# Patient Record
Sex: MALE | Race: BLACK OR AFRICAN AMERICAN | Employment: PART TIME | URBAN - METROPOLITAN AREA
[De-identification: names, ages, dates, MRNs, and addresses within clinical notes are randomized per-mention and may not be internally consistent; named-entity substitution may affect disease eponyms.]

---

## 2017-01-27 PROBLEM — Z95.810 SINGLE IMPLANTABLE CARDIOVERTER-DEFIBRILLATOR (ICD) IN SITU: Status: ACTIVE | Noted: 2017-01-27

## 2018-03-22 ENCOUNTER — NURSE ONLY (OUTPATIENT)
Dept: NON INVASIVE DIAGNOSTICS | Age: 43
End: 2018-03-22
Payer: MEDICAID

## 2018-03-22 DIAGNOSIS — I42.8 NON-ISCHEMIC CARDIOMYOPATHY (HCC): ICD-10-CM

## 2018-03-22 DIAGNOSIS — Z95.810 SINGLE IMPLANTABLE CARDIOVERTER-DEFIBRILLATOR (ICD) IN SITU: Primary | ICD-10-CM

## 2018-03-22 PROCEDURE — 93296 REM INTERROG EVL PM/IDS: CPT | Performed by: INTERNAL MEDICINE

## 2018-03-22 PROCEDURE — 93295 DEV INTERROG REMOTE 1/2/MLT: CPT | Performed by: INTERNAL MEDICINE

## 2018-03-27 ENCOUNTER — TELEPHONE (OUTPATIENT)
Dept: NON INVASIVE DIAGNOSTICS | Age: 43
End: 2018-03-27

## 2018-04-04 ENCOUNTER — OFFICE VISIT (OUTPATIENT)
Dept: NON INVASIVE DIAGNOSTICS | Age: 43
End: 2018-04-04
Payer: MEDICAID

## 2018-04-04 VITALS
RESPIRATION RATE: 16 BRPM | HEART RATE: 57 BPM | DIASTOLIC BLOOD PRESSURE: 80 MMHG | BODY MASS INDEX: 31.07 KG/M2 | SYSTOLIC BLOOD PRESSURE: 104 MMHG | HEIGHT: 68 IN | WEIGHT: 205 LBS

## 2018-04-04 DIAGNOSIS — I50.22 CHRONIC SYSTOLIC HEART FAILURE (HCC): ICD-10-CM

## 2018-04-04 DIAGNOSIS — Z95.810 SINGLE IMPLANTABLE CARDIOVERTER-DEFIBRILLATOR (ICD) IN SITU: Primary | ICD-10-CM

## 2018-04-04 DIAGNOSIS — E66.8 MODERATE OBESITY: ICD-10-CM

## 2018-04-04 DIAGNOSIS — I10 ESSENTIAL HYPERTENSION: ICD-10-CM

## 2018-04-04 PROCEDURE — G8427 DOCREV CUR MEDS BY ELIG CLIN: HCPCS | Performed by: INTERNAL MEDICINE

## 2018-04-04 PROCEDURE — G8417 CALC BMI ABV UP PARAM F/U: HCPCS | Performed by: INTERNAL MEDICINE

## 2018-04-04 PROCEDURE — 1036F TOBACCO NON-USER: CPT | Performed by: INTERNAL MEDICINE

## 2018-04-04 PROCEDURE — 93282 PRGRMG EVAL IMPLANTABLE DFB: CPT | Performed by: INTERNAL MEDICINE

## 2018-04-04 PROCEDURE — 99213 OFFICE O/P EST LOW 20 MIN: CPT | Performed by: INTERNAL MEDICINE

## 2018-04-04 RX ORDER — IBUPROFEN 800 MG/1
800 TABLET ORAL EVERY 8 HOURS PRN
COMMUNITY
Start: 2018-03-30 | End: 2018-08-19

## 2018-04-04 RX ORDER — IVABRADINE 5 MG/1
TABLET, FILM COATED ORAL
Refills: 2 | COMMUNITY
Start: 2018-02-18 | End: 2018-06-05

## 2018-04-25 ENCOUNTER — HOSPITAL ENCOUNTER (EMERGENCY)
Age: 43
Discharge: HOME | End: 2018-04-25
Payer: COMMERCIAL

## 2018-04-25 VITALS
DIASTOLIC BLOOD PRESSURE: 76 MMHG | WEIGHT: 225 LBS | BODY MASS INDEX: 27.98 KG/M2 | SYSTOLIC BLOOD PRESSURE: 126 MMHG | HEART RATE: 96 BPM | HEIGHT: 75 IN | TEMPERATURE: 98.4 F | OXYGEN SATURATION: 98 %

## 2018-04-25 DIAGNOSIS — S39.012A STRAIN OF LUMBAR REGION, INITIAL ENCOUNTER: Primary | ICD-10-CM

## 2018-04-25 PROCEDURE — 6360000002 HC RX W HCPCS: Performed by: NURSE PRACTITIONER

## 2018-04-25 PROCEDURE — 99282 EMERGENCY DEPT VISIT SF MDM: CPT

## 2018-04-25 PROCEDURE — 96372 THER/PROPH/DIAG INJ SC/IM: CPT

## 2018-04-25 PROCEDURE — 99283 EMERGENCY DEPT VISIT LOW MDM: CPT | Performed by: NURSE PRACTITIONER

## 2018-04-25 RX ORDER — KETOROLAC TROMETHAMINE 30 MG/ML
30 INJECTION, SOLUTION INTRAMUSCULAR; INTRAVENOUS ONCE
Status: COMPLETED | OUTPATIENT
Start: 2018-04-25 | End: 2018-04-25

## 2018-04-25 RX ORDER — PREDNISONE 20 MG/1
20 TABLET ORAL 2 TIMES DAILY
Qty: 10 TABLET | Refills: 0 | Status: SHIPPED | OUTPATIENT
Start: 2018-04-25 | End: 2018-04-30

## 2018-04-25 RX ORDER — ORPHENADRINE CITRATE 30 MG/ML
60 INJECTION INTRAMUSCULAR; INTRAVENOUS ONCE
Status: COMPLETED | OUTPATIENT
Start: 2018-04-25 | End: 2018-04-25

## 2018-04-25 RX ORDER — CYCLOBENZAPRINE HCL 10 MG
10 TABLET ORAL 3 TIMES DAILY PRN
Qty: 30 TABLET | Refills: 0 | Status: SHIPPED | OUTPATIENT
Start: 2018-04-25 | End: 2018-05-05

## 2018-04-25 RX ORDER — METHYLPREDNISOLONE SODIUM SUCCINATE 125 MG/2ML
125 INJECTION, POWDER, LYOPHILIZED, FOR SOLUTION INTRAMUSCULAR; INTRAVENOUS ONCE
Status: COMPLETED | OUTPATIENT
Start: 2018-04-25 | End: 2018-04-25

## 2018-04-25 RX ADMIN — KETOROLAC TROMETHAMINE 30 MG: 30 INJECTION, SOLUTION INTRAMUSCULAR; INTRAVENOUS at 08:13

## 2018-04-25 RX ADMIN — METHYLPREDNISOLONE SODIUM SUCCINATE 125 MG: 125 INJECTION, POWDER, FOR SOLUTION INTRAMUSCULAR; INTRAVENOUS at 08:13

## 2018-04-25 RX ADMIN — ORPHENADRINE CITRATE 60 MG: 30 INJECTION INTRAMUSCULAR; INTRAVENOUS at 08:13

## 2018-04-25 ASSESSMENT — ENCOUNTER SYMPTOMS
BOWEL INCONTINENCE: 0
BACK PAIN: 1
ABDOMINAL PAIN: 0

## 2018-04-25 ASSESSMENT — PAIN SCALES - GENERAL: PAINLEVEL_OUTOF10: 7

## 2018-06-05 ENCOUNTER — OFFICE VISIT (OUTPATIENT)
Dept: CARDIOLOGY CLINIC | Age: 43
End: 2018-06-05
Payer: MEDICAID

## 2018-06-05 VITALS
DIASTOLIC BLOOD PRESSURE: 74 MMHG | WEIGHT: 205 LBS | SYSTOLIC BLOOD PRESSURE: 110 MMHG | BODY MASS INDEX: 31.07 KG/M2 | HEART RATE: 65 BPM | HEIGHT: 68 IN

## 2018-06-05 DIAGNOSIS — E66.8 MODERATE OBESITY: ICD-10-CM

## 2018-06-05 DIAGNOSIS — I25.10 CORONARY ARTERY DISEASE INVOLVING NATIVE CORONARY ARTERY OF NATIVE HEART WITHOUT ANGINA PECTORIS: ICD-10-CM

## 2018-06-05 DIAGNOSIS — I50.22 CHRONIC SYSTOLIC HEART FAILURE (HCC): ICD-10-CM

## 2018-06-05 DIAGNOSIS — I10 ESSENTIAL HYPERTENSION: ICD-10-CM

## 2018-06-05 DIAGNOSIS — N18.30 CKD (CHRONIC KIDNEY DISEASE) STAGE 3, GFR 30-59 ML/MIN (HCC): ICD-10-CM

## 2018-06-05 DIAGNOSIS — E78.00 PURE HYPERCHOLESTEROLEMIA: ICD-10-CM

## 2018-06-05 DIAGNOSIS — I42.8 NON-ISCHEMIC CARDIOMYOPATHY (HCC): Primary | ICD-10-CM

## 2018-06-05 PROCEDURE — G8427 DOCREV CUR MEDS BY ELIG CLIN: HCPCS | Performed by: INTERNAL MEDICINE

## 2018-06-05 PROCEDURE — 93000 ELECTROCARDIOGRAM COMPLETE: CPT | Performed by: INTERNAL MEDICINE

## 2018-06-05 PROCEDURE — G8598 ASA/ANTIPLAT THER USED: HCPCS | Performed by: INTERNAL MEDICINE

## 2018-06-05 PROCEDURE — 99214 OFFICE O/P EST MOD 30 MIN: CPT | Performed by: INTERNAL MEDICINE

## 2018-06-05 PROCEDURE — 1036F TOBACCO NON-USER: CPT | Performed by: INTERNAL MEDICINE

## 2018-06-05 PROCEDURE — G8417 CALC BMI ABV UP PARAM F/U: HCPCS | Performed by: INTERNAL MEDICINE

## 2018-06-05 RX ORDER — ATORVASTATIN CALCIUM 20 MG/1
20 TABLET, FILM COATED ORAL DAILY
Qty: 30 TABLET | Refills: 3 | Status: SHIPPED | OUTPATIENT
Start: 2018-06-05 | End: 2018-11-05 | Stop reason: SDUPTHER

## 2018-06-22 ENCOUNTER — OFFICE VISIT (OUTPATIENT)
Dept: CARDIOLOGY CLINIC | Age: 43
End: 2018-06-22
Payer: MEDICAID

## 2018-06-22 VITALS
DIASTOLIC BLOOD PRESSURE: 70 MMHG | HEART RATE: 56 BPM | HEIGHT: 68 IN | BODY MASS INDEX: 31.83 KG/M2 | SYSTOLIC BLOOD PRESSURE: 100 MMHG | WEIGHT: 210 LBS

## 2018-06-22 DIAGNOSIS — Z95.810 SINGLE IMPLANTABLE CARDIOVERTER-DEFIBRILLATOR (ICD) IN SITU: ICD-10-CM

## 2018-06-22 DIAGNOSIS — I50.22 CHRONIC SYSTOLIC HEART FAILURE (HCC): ICD-10-CM

## 2018-06-22 DIAGNOSIS — I42.9 CARDIOMYOPATHY, UNSPECIFIED TYPE (HCC): Primary | ICD-10-CM

## 2018-06-22 DIAGNOSIS — I42.8 NON-ISCHEMIC CARDIOMYOPATHY (HCC): ICD-10-CM

## 2018-06-22 PROCEDURE — 93000 ELECTROCARDIOGRAM COMPLETE: CPT | Performed by: INTERNAL MEDICINE

## 2018-06-22 PROCEDURE — 99213 OFFICE O/P EST LOW 20 MIN: CPT | Performed by: NURSE PRACTITIONER

## 2018-06-22 PROCEDURE — 1036F TOBACCO NON-USER: CPT | Performed by: NURSE PRACTITIONER

## 2018-06-22 PROCEDURE — G8417 CALC BMI ABV UP PARAM F/U: HCPCS | Performed by: NURSE PRACTITIONER

## 2018-06-22 PROCEDURE — G8598 ASA/ANTIPLAT THER USED: HCPCS | Performed by: NURSE PRACTITIONER

## 2018-06-22 PROCEDURE — G8427 DOCREV CUR MEDS BY ELIG CLIN: HCPCS | Performed by: NURSE PRACTITIONER

## 2018-06-28 RX ORDER — RANITIDINE 150 MG/1
TABLET ORAL
Qty: 30 TABLET | Refills: 0 | Status: SHIPPED | OUTPATIENT
Start: 2018-06-28 | End: 2018-12-13

## 2018-08-01 ENCOUNTER — NURSE ONLY (OUTPATIENT)
Dept: NON INVASIVE DIAGNOSTICS | Age: 43
End: 2018-08-01
Payer: MEDICAID

## 2018-08-01 DIAGNOSIS — I42.8 NON-ISCHEMIC CARDIOMYOPATHY (HCC): ICD-10-CM

## 2018-08-01 DIAGNOSIS — Z95.810 SINGLE IMPLANTABLE CARDIOVERTER-DEFIBRILLATOR (ICD) IN SITU: Primary | ICD-10-CM

## 2018-08-01 PROCEDURE — 93296 REM INTERROG EVL PM/IDS: CPT | Performed by: INTERNAL MEDICINE

## 2018-08-01 PROCEDURE — 93295 DEV INTERROG REMOTE 1/2/MLT: CPT | Performed by: INTERNAL MEDICINE

## 2018-08-09 ENCOUNTER — TELEPHONE (OUTPATIENT)
Dept: NON INVASIVE DIAGNOSTICS | Age: 43
End: 2018-08-09

## 2018-08-09 NOTE — PROGRESS NOTES
See [facility] [facility] report. Remote monitoring reviewed over a 90 day period. End of 90 day monitoring period date of service 8. 1.2018.

## 2018-08-10 NOTE — PROGRESS NOTES
I have personally reviewed the remote ICD data. Stable battery and lead parameters.   - No clinically significant arrhythmias noted  - 4 episodes of \"NSVT\" however had >95% morphology match which suggests SVT, and possible sinus tach and NOT VT   - continue follow up as recommended    Toshia Lewis MD, [address]  [facility]: [facility]  12:59 PM  8/10/2018

## 2018-08-16 ENCOUNTER — OFFICE VISIT (OUTPATIENT)
Dept: FAMILY MEDICINE CLINIC | Age: 43
End: 2018-08-16
Payer: MEDICAID

## 2018-08-16 ENCOUNTER — HOSPITAL ENCOUNTER (OUTPATIENT)
Age: 43
Discharge: HOME | End: 2018-08-18
Payer: MEDICAID

## 2018-08-16 VITALS
RESPIRATION RATE: 16 BRPM | HEIGHT: 68 IN | BODY MASS INDEX: 30.84 KG/M2 | DIASTOLIC BLOOD PRESSURE: 80 MMHG | WEIGHT: 203.5 LBS | SYSTOLIC BLOOD PRESSURE: 116 MMHG | HEART RATE: 55 BPM | TEMPERATURE: 98.2 F | OXYGEN SATURATION: 97 %

## 2018-08-16 DIAGNOSIS — Z87.19 HISTORY OF DIVERTICULITIS: ICD-10-CM

## 2018-08-16 DIAGNOSIS — I50.22 CHRONIC SYSTOLIC HEART FAILURE (HCC): ICD-10-CM

## 2018-08-16 DIAGNOSIS — R10.32 LLQ PAIN: Primary | ICD-10-CM

## 2018-08-16 DIAGNOSIS — Z11.3 SCREEN FOR STD (SEXUALLY TRANSMITTED DISEASE): ICD-10-CM

## 2018-08-16 DIAGNOSIS — I10 ESSENTIAL HYPERTENSION: ICD-10-CM

## 2018-08-16 DIAGNOSIS — N18.30 CKD (CHRONIC KIDNEY DISEASE) STAGE 3, GFR 30-59 ML/MIN (HCC): ICD-10-CM

## 2018-08-16 LAB
ALBUMIN SERPL-MCNC: 4.1 G/DL (ref 3.5–5.2)
ALP BLD-CCNC: 89 U/L (ref 40–129)
ALT SERPL-CCNC: 15 U/L (ref 0–40)
ANION GAP SERPL CALCULATED.3IONS-SCNC: 17 MMOL/L (ref 7–16)
AST SERPL-CCNC: 20 U/L (ref 0–39)
BASOPHILS ABSOLUTE: 0.07 E9/L (ref 0–0.2)
BASOPHILS RELATIVE PERCENT: 1 % (ref 0–2)
BILIRUB SERPL-MCNC: 0.7 MG/DL (ref 0–1.2)
BUN BLDV-MCNC: 10 MG/DL (ref 6–20)
CALCIUM SERPL-MCNC: 9.6 MG/DL (ref 8.6–10.2)
CHLORIDE BLD-SCNC: 102 MMOL/L (ref 98–107)
CO2: 26 MMOL/L (ref 22–29)
CREAT SERPL-MCNC: 1.6 MG/DL (ref 0.7–1.2)
EOSINOPHILS ABSOLUTE: 0.33 E9/L (ref 0.05–0.5)
EOSINOPHILS RELATIVE PERCENT: 4.5 % (ref 0–6)
GFR AFRICAN AMERICAN: 57
GFR NON-AFRICAN AMERICAN: 57 ML/MIN/1.73
GLUCOSE BLD-MCNC: 102 MG/DL (ref 74–109)
HCT VFR BLD CALC: 46.1 % (ref 37–54)
HEMOGLOBIN: 14.9 G/DL (ref 12.5–16.5)
IMMATURE GRANULOCYTES #: 0.02 E9/L
IMMATURE GRANULOCYTES %: 0.3 % (ref 0–5)
LYMPHOCYTES ABSOLUTE: 2.48 E9/L (ref 1.5–4)
LYMPHOCYTES RELATIVE PERCENT: 34.2 % (ref 20–42)
MCH RBC QN AUTO: 28.3 PG (ref 26–35)
MCHC RBC AUTO-ENTMCNC: 32.3 % (ref 32–34.5)
MCV RBC AUTO: 87.5 FL (ref 80–99.9)
MONOCYTES ABSOLUTE: 0.53 E9/L (ref 0.1–0.95)
MONOCYTES RELATIVE PERCENT: 7.3 % (ref 2–12)
NEUTROPHILS ABSOLUTE: 3.83 E9/L (ref 1.8–7.3)
NEUTROPHILS RELATIVE PERCENT: 52.7 % (ref 43–80)
PDW BLD-RTO: 14.4 FL (ref 11.5–15)
PLATELET # BLD: 275 E9/L (ref 130–450)
PMV BLD AUTO: 10.9 FL (ref 7–12)
POTASSIUM SERPL-SCNC: 3.4 MMOL/L (ref 3.5–5)
RBC # BLD: 5.27 E12/L (ref 3.8–5.8)
SODIUM BLD-SCNC: 145 MMOL/L (ref 132–146)
TOTAL PROTEIN: 7.5 G/DL (ref 6.4–8.3)
WBC # BLD: 7.3 E9/L (ref 4.5–11.5)

## 2018-08-16 PROCEDURE — 86592 SYPHILIS TEST NON-TREP QUAL: CPT

## 2018-08-16 PROCEDURE — 86694 HERPES SIMPLEX NES ANTBDY: CPT

## 2018-08-16 PROCEDURE — 1036F TOBACCO NON-USER: CPT | Performed by: FAMILY MEDICINE

## 2018-08-16 PROCEDURE — 80053 COMPREHEN METABOLIC PANEL: CPT

## 2018-08-16 PROCEDURE — 84156 ASSAY OF PROTEIN URINE: CPT

## 2018-08-16 PROCEDURE — 85025 COMPLETE CBC W/AUTO DIFF WBC: CPT

## 2018-08-16 PROCEDURE — 36415 COLL VENOUS BLD VENIPUNCTURE: CPT | Performed by: FAMILY MEDICINE

## 2018-08-16 PROCEDURE — 99214 OFFICE O/P EST MOD 30 MIN: CPT | Performed by: FAMILY MEDICINE

## 2018-08-16 PROCEDURE — 86703 HIV-1/HIV-2 1 RESULT ANTBDY: CPT

## 2018-08-16 PROCEDURE — 82570 ASSAY OF URINE CREATININE: CPT

## 2018-08-16 PROCEDURE — G8417 CALC BMI ABV UP PARAM F/U: HCPCS | Performed by: FAMILY MEDICINE

## 2018-08-16 PROCEDURE — 86803 HEPATITIS C AB TEST: CPT

## 2018-08-16 PROCEDURE — 87591 N.GONORRHOEAE DNA AMP PROB: CPT

## 2018-08-16 PROCEDURE — G8427 DOCREV CUR MEDS BY ELIG CLIN: HCPCS | Performed by: FAMILY MEDICINE

## 2018-08-16 PROCEDURE — 87491 CHLMYD TRACH DNA AMP PROBE: CPT

## 2018-08-16 PROCEDURE — G8598 ASA/ANTIPLAT THER USED: HCPCS | Performed by: FAMILY MEDICINE

## 2018-08-16 NOTE — PROGRESS NOTES
8/16/2018    Skyler Valentine is a 43 y.o. male here for   Chief Complaint   Patient presents with    Annual Exam     pt is requesting handicap placard    Abdominal Pain    Hypertension   here for routine f/u and exam, management of chronic conditions. Had an episode of squeezing chest pain a few months ago. Was hopstliazed for a few days and treated with nitro. He had a stress test which was 'fine' per patient. He had an echo as well. Some dyspnea but not as much as he used to have   For examples, trouble going up many stairs. Typically 15 stairs is okay but not if carrying items - however any symptoms he has are relieved by rest and are not associated with chest pain. He was told that his kidneys were worst at time of last hospitalization but has not been back  To see his nephrologist - has been seen by both DR Lexy Naylor [facility] past.   Regarding hypertension. Patient is  monitoring home blood pressures. Cardiovascular risk factors: dyslipidemia, hypertension, male gender and obesity (BMI >= 30 kg/m2). Patient does not smoke. Currently on lasix 40 mg, imdur 120 m, hydralazine, coreg 25 mg twice daily, also corlanor. . Taking as prescribed. No adverse effects. Today,  BP: 116/80 and he is asymptomatic. BP Readings from Last 3 Encounters:   08/16/18 116/80   06/22/18 100/70   06/05/18 110/74     Patient denies chest pain, diaphoresis, dyspnea on exertion, peripheral edema, palpitations, headache, vision changes. Wt Readings from Last 3 Encounters:   08/16/18 203 lb 8 oz (92.3 kg)   06/22/18 210 lb (95.3 kg)   06/05/18 205 lb (93 kg)     He also has a history of diverticulitis. Has been having some LLQ intermittently. He does have some mild to moderate pain today.      No Known Allergies    Medications  Current Outpatient Prescriptions   Medication Sig Dispense Refill    furosemide (LASIX) 40 MG tablet TAKE ONE TABLET BY MOUTH EVERY DAY 30 tablet 0    isosorbide mononitrate

## 2018-08-17 LAB
CREATININE URINE: 87 MG/DL (ref 40–278)
HEPATITIS C ANTIBODY INTERPRETATION: NORMAL
HIV-1 AND HIV-2 ANTIBODIES: NORMAL
PROTEIN PROTEIN: 11 MG/DL (ref 0–12)
PROTEIN/CREAT RATIO: 0.1
PROTEIN/CREAT RATIO: 0.1 (ref 0–0.2)
RPR: NORMAL

## 2018-08-18 LAB — HERPES TYPE I/II IGG COMBINED: >22.4 IV

## 2018-08-19 ASSESSMENT — ENCOUNTER SYMPTOMS
ABDOMINAL PAIN: 1
SHORTNESS OF BREATH: 1
VOMITING: 0
WHEEZING: 0
BLOOD IN STOOL: 0
COUGH: 0

## 2018-08-20 LAB
CHLAMYDIA TRACHOMATIS AMPLIFIED DET: NORMAL
N GONORRHOEAE AMPLIFIED DET: NORMAL

## 2018-09-05 NOTE — PROGRESS NOTES
[address]                                                                                                                     PRE OP INSTRUCTIONS FOR  Irma Ford        Date: 9/5/2018    Date and time of surgery: 9/6/2018   Arrival Time:     1. Do not eat or drink anything after 12 midnight prior to surgery. This includes no water, chewing gum, mints or ice chips. 2. Take the following pills with a small sip of water on the morning of Surgery: heart meds    3. Diabetics may take evening dose of insulin but none after midnight. If you feel symptomatic or low blood sugar take 1-2 ounces of apple juice only. 4. Aspirin, Ibuprofen, Advil, Naproxen, Vitamin E and other Anti-inflammatory products should be stopped  before surgery  as directed by your physician. 5. Check with your Doctor regarding stopping Plavix, Coumadin, Lovenox, Fragmin or other blood thinners. 6. Do not smoke,use illicit drugs and do not drink any alcoholic beverages 24 hours prior to surgery. 7. You may brush your teeth and gargle the morning of surgery. DO NOT SWALLOW WATER    8. You MUST make arrangements for a responsible adult to take you home after your surgery. You will not be allowed to leave alone or drive yourself home. It is strongly suggested someone stay with you the first 24 hrs. Your surgery will be cancelled if you do not have a ride home. 9. A parent/legal guardian must accompany a child scheduled for surgery and plan to stay at the hospital until the child is discharged. Please do not bring other children with you. 10. Please wear simple, loose fitting clothing to the hospital.  Stephen Wilsonmartín not bring valuables (money, credit cards, checkbooks, etc.) Do not wear any makeup (including no eye makeup) or nail polish on your fingers or toes. 11. DO NOT wear any jewelry or piercings on day of surgery. All body piercing jewelry must be removed. 12.  Shower the night before surgery with

## 2018-09-06 ENCOUNTER — ANESTHESIA (OUTPATIENT)
Dept: ENDOSCOPY | Age: 43
End: 2018-09-06
Payer: MEDICAID

## 2018-09-06 ENCOUNTER — ANESTHESIA EVENT (OUTPATIENT)
Dept: ENDOSCOPY | Age: 43
End: 2018-09-06
Payer: MEDICAID

## 2018-09-06 ENCOUNTER — HOSPITAL ENCOUNTER (OUTPATIENT)
Age: 43
Setting detail: OUTPATIENT SURGERY
Discharge: HOME | End: 2018-09-06
Attending: INTERNAL MEDICINE | Admitting: INTERNAL MEDICINE
Payer: MEDICAID

## 2018-09-06 VITALS
DIASTOLIC BLOOD PRESSURE: 64 MMHG | OXYGEN SATURATION: 97 % | SYSTOLIC BLOOD PRESSURE: 105 MMHG | RESPIRATION RATE: 27 BRPM

## 2018-09-06 VITALS
RESPIRATION RATE: 16 BRPM | HEART RATE: 60 BPM | SYSTOLIC BLOOD PRESSURE: 92 MMHG | OXYGEN SATURATION: 98 % | TEMPERATURE: 97.4 F | DIASTOLIC BLOOD PRESSURE: 60 MMHG | BODY MASS INDEX: 31.3 KG/M2 | HEIGHT: 68 IN | WEIGHT: 206.5 LBS

## 2018-09-06 PROCEDURE — 2580000003 HC RX 258: Performed by: ANESTHESIOLOGY

## 2018-09-06 PROCEDURE — 7100000011 HC PHASE II RECOVERY - ADDTL 15 MIN: Performed by: INTERNAL MEDICINE

## 2018-09-06 PROCEDURE — 3609027000 HC COLONOSCOPY: Performed by: INTERNAL MEDICINE

## 2018-09-06 PROCEDURE — 2709999900 HC NON-CHARGEABLE SUPPLY: Performed by: INTERNAL MEDICINE

## 2018-09-06 PROCEDURE — 3700000001 HC ADD 15 MINUTES (ANESTHESIA): Performed by: INTERNAL MEDICINE

## 2018-09-06 PROCEDURE — 7100000010 HC PHASE II RECOVERY - FIRST 15 MIN: Performed by: INTERNAL MEDICINE

## 2018-09-06 PROCEDURE — 3700000000 HC ANESTHESIA ATTENDED CARE: Performed by: INTERNAL MEDICINE

## 2018-09-06 PROCEDURE — 6360000002 HC RX W HCPCS: Performed by: NURSE ANESTHETIST, CERTIFIED REGISTERED

## 2018-09-06 RX ORDER — SODIUM CHLORIDE, SODIUM LACTATE, POTASSIUM CHLORIDE, CALCIUM CHLORIDE 600; 310; 30; 20 MG/100ML; MG/100ML; MG/100ML; MG/100ML
INJECTION, SOLUTION INTRAVENOUS CONTINUOUS
Status: DISCONTINUED | OUTPATIENT
Start: 2018-09-06 | End: 2018-09-06 | Stop reason: HOSPADM

## 2018-09-06 RX ORDER — 0.9 % SODIUM CHLORIDE 0.9 %
10 VIAL (ML) INJECTION PRN
Status: DISCONTINUED | OUTPATIENT
Start: 2018-09-06 | End: 2018-09-06 | Stop reason: HOSPADM

## 2018-09-06 RX ORDER — 0.9 % SODIUM CHLORIDE 0.9 %
10 VIAL (ML) INJECTION EVERY 12 HOURS SCHEDULED
Status: DISCONTINUED | OUTPATIENT
Start: 2018-09-06 | End: 2018-09-06 | Stop reason: HOSPADM

## 2018-09-06 RX ORDER — PROPOFOL 10 MG/ML
INJECTION, EMULSION INTRAVENOUS CONTINUOUS PRN
Status: DISCONTINUED | OUTPATIENT
Start: 2018-09-06 | End: 2018-09-06 | Stop reason: SDUPTHER

## 2018-09-06 RX ORDER — PROPOFOL 10 MG/ML
INJECTION, EMULSION INTRAVENOUS PRN
Status: DISCONTINUED | OUTPATIENT
Start: 2018-09-06 | End: 2018-09-06 | Stop reason: SDUPTHER

## 2018-09-06 RX ADMIN — PROPOFOL 50 MG: 10 INJECTION, EMULSION INTRAVENOUS at 07:31

## 2018-09-06 RX ADMIN — PROPOFOL 80 MG: 10 INJECTION, EMULSION INTRAVENOUS at 07:30

## 2018-09-06 RX ADMIN — SODIUM CHLORIDE, POTASSIUM CHLORIDE, SODIUM LACTATE AND CALCIUM CHLORIDE: 600; 310; 30; 20 INJECTION, SOLUTION INTRAVENOUS at 07:26

## 2018-09-06 RX ADMIN — SODIUM CHLORIDE, POTASSIUM CHLORIDE, SODIUM LACTATE AND CALCIUM CHLORIDE: 600; 310; 30; 20 INJECTION, SOLUTION INTRAVENOUS at 06:40

## 2018-09-06 RX ADMIN — PROPOFOL 180 MCG/KG/MIN: 10 INJECTION, EMULSION INTRAVENOUS at 07:30

## 2018-09-06 RX ADMIN — PROPOFOL 30 MG: 10 INJECTION, EMULSION INTRAVENOUS at 07:32

## 2018-09-06 ASSESSMENT — PAIN - FUNCTIONAL ASSESSMENT: PAIN_FUNCTIONAL_ASSESSMENT: 0-10

## 2018-09-06 ASSESSMENT — PAIN SCALES - GENERAL: PAINLEVEL_OUTOF10: 0

## 2018-09-06 NOTE — ANESTHESIA PRE PROCEDURE
Department of Anesthesiology  Preprocedure Note       Name:  Alisha Parmar   Age:  43 y.o.  :  1975                                          MRN:  [id number]         Date:  2018      Surgeon: Niharika West):  Michele Dhaliwal MD    Procedure: Procedure(s):  COLONOSCOPY    Medications prior to admission:   Prior to Admission medications    Medication Sig Start Date End Date Taking? Authorizing Provider   ASPIRIN LOW DOSE 81 MG chewable tablet CHEW AND SWALLOW ONE TABLET BY MOUTH EVERY DAY 18   Brittany Choi MD   furosemide (LASIX) 40 MG tablet TAKE ONE TABLET BY MOUTH EVERY DAY 18   MD Sandi Ugalde MISC by Does not apply route Patient cannot walk 200 ft without stopping to rest.    Expiration 18   Cassandra Smith MD   isosorbide mononitrate (IMDUR) 120 MG extended release tablet TAKE ONE TABLET BY MOUTH EVERY DAY 18   Brittany Choi MD   hydrALAZINE (APRESOLINE) 25 MG tablet TAKE THREE (3) TABLETS BY MOUTH THREE TIMES DAILY 7/10/18   Brittany Choi MD   ranitidine (ZANTAC) 150 MG tablet TAKE ONE TABLET BY MOUTH EVERY DAY AS NEEDED FOR HEARTBURN 18   Cassandra Smith MD   atorvastatin (LIPITOR) 20 MG tablet Take 1 tablet by mouth daily 18   Brittany Choi MD   CORLANOR 5 MG TABS tablet TAKE ONE TABLET BY MOUTH TWICE A DAY WITH MEALS 18   Brittany Choi MD   carvedilol (COREG) 25 MG tablet TAKE TWO TABLETS BY MOUTH TWO TIMES A DAY WITH MEALS 3/26/18   MD Sandi Calixto by Does not apply route Patient cannot walk 200 ft without stopping to rest.    Expiration 16   Cassandra Smith MD   albuterol sulfate HFA (PROVENTIL HFA) 108 (90 BASE) MCG/ACT inhaler Inhale 2 puffs into the lungs every 6 hours as needed for Wheezing 3/16/16 4/4/19  Calixto Morris,    Blood Pressure KIT Upper arm kit; use daily as directed.   Diagnosis uncontrolled hypertension, symptomatic, CKD 3 10/6/15   Cassandra Smith MD Counseling given: Not Answered      Vital Signs (Current): There were no vitals filed for this visit. BP Readings from Last 3 Encounters:   08/16/18 116/80   06/22/18 100/70   06/05/18 110/74       NPO Status:                                                                                 BMI:   Wt Readings from Last 3 Encounters:   08/16/18 203 lb 8 oz (92.3 kg)   06/22/18 210 lb (95.3 kg)   06/05/18 205 lb (93 kg)     There is no height or weight on file to calculate BMI.    CBC:   Lab Results   Component Value Date    WBC 7.3 08/16/2018    RBC 5.27 08/16/2018    HGB 14.9 08/16/2018    HCT 46.1 08/16/2018    MCV 87.5 08/16/2018    RDW 14.4 08/16/2018     08/16/2018       CMP:   Lab Results   Component Value Date     08/16/2018    K 3.4 08/16/2018     08/16/2018    CO2 26 08/16/2018    BUN 10 08/16/2018    CREATININE 1.6 08/16/2018    GFRAA 57 08/16/2018    LABGLOM 57 08/16/2018    GLUCOSE 102 08/16/2018    PROT 7.5 08/16/2018    CALCIUM 9.6 08/16/2018    BILITOT 0.7 08/16/2018    ALKPHOS 89 08/16/2018    AST 20 08/16/2018    ALT 15 08/16/2018       POC Tests: No results for input(s): POCGLU, POCNA, POCK, POCCL, POCBUN, POCHEMO, POCHCT in the last 72 hours.     Coags:   Lab Results   Component Value Date    PROTIME 12.2 01/28/2017    INR 1.1 01/28/2017       HCG (If Applicable): No results found for: PREGTESTUR, PREGSERUM, HCG, HCGQUANT     ABGs: No results found for: PHART, PO2ART, XVJ5BMX, TTG8OEJ, BEART, B0RQGGQO     Type & Screen (If Applicable):  No results found for: LABABO, [address]    Anesthesia Evaluation  Patient summary reviewed  Airway: Mallampati: II  TM distance: >3 FB   Neck ROM: full  Mouth opening: > = 3 FB Dental:      Comment: Grossly intact    Pulmonary: breath sounds clear to auscultation  (+) COPD:  sleep apnea:  asthma:                            Cardiovascular:  Exercise tolerance: good (>4 METS),   (+)

## 2018-09-06 NOTE — ANESTHESIA POSTPROCEDURE EVALUATION
Department of Anesthesiology  Postprocedure Note    Patient: Guy Garrett  MRN: [id number]  YOB: 1975  Date of evaluation: 9/6/2018  Time:  10:27 AM     Procedure Summary     Date:  09/06/18 Room / Location:  [address] ENDOSCOPY    Anesthesia Start:  0726 Anesthesia Stop:  6228    Procedure:  COLONOSCOPY (N/A ) Diagnosis:  (SCREENING)    Surgeon:  Radha Mcnally MD Responsible Provider:  Ann Addison MD    Anesthesia Type:  MAC ASA Status:  4          Anesthesia Type: MAC    Arthur Phase I: Arthur Score: 10    Arthur Phase II: Arthur Score: 10    Last vitals: Reviewed and per EMR flowsheets.        Anesthesia Post Evaluation    Patient location during evaluation: PACU  Patient participation: complete - patient participated  Level of consciousness: awake and alert  Airway patency: patent  Nausea & Vomiting: no vomiting and no nausea  Complications: no  Cardiovascular status: hemodynamically stable  Respiratory status: acceptable  Hydration status: stable

## 2018-11-13 ENCOUNTER — TELEPHONE (OUTPATIENT)
Dept: NON INVASIVE DIAGNOSTICS | Age: 43
End: 2018-11-13

## 2018-11-27 ENCOUNTER — TELEPHONE (OUTPATIENT)
Dept: NON INVASIVE DIAGNOSTICS | Age: 43
End: 2018-11-27

## 2018-12-03 ENCOUNTER — NURSE ONLY (OUTPATIENT)
Dept: NON INVASIVE DIAGNOSTICS | Age: 43
End: 2018-12-03
Payer: MEDICAID

## 2018-12-03 DIAGNOSIS — I42.8 NON-ISCHEMIC CARDIOMYOPATHY (HCC): ICD-10-CM

## 2018-12-03 DIAGNOSIS — Z95.810 SINGLE IMPLANTABLE CARDIOVERTER-DEFIBRILLATOR (ICD) IN SITU: Primary | ICD-10-CM

## 2018-12-03 PROCEDURE — 93296 REM INTERROG EVL PM/IDS: CPT | Performed by: INTERNAL MEDICINE

## 2018-12-03 PROCEDURE — 93295 DEV INTERROG REMOTE 1/2/MLT: CPT | Performed by: INTERNAL MEDICINE

## 2018-12-12 ENCOUNTER — TELEPHONE (OUTPATIENT)
Dept: NON INVASIVE DIAGNOSTICS | Age: 43
End: 2018-12-12

## 2018-12-12 NOTE — TELEPHONE ENCOUNTER
We have received your remote transmission. Our staff will contact you if there is anything that needs to be discussed. Your next appointment is 03/06/2019 remote transmission. Spoke with pt and verified next remote transmission from home.

## 2018-12-13 ENCOUNTER — OFFICE VISIT (OUTPATIENT)
Dept: CARDIOLOGY CLINIC | Age: 43
End: 2018-12-13
Payer: MEDICAID

## 2018-12-13 VITALS
HEIGHT: 68 IN | HEART RATE: 66 BPM | DIASTOLIC BLOOD PRESSURE: 80 MMHG | WEIGHT: 203 LBS | SYSTOLIC BLOOD PRESSURE: 130 MMHG | BODY MASS INDEX: 30.77 KG/M2

## 2018-12-13 DIAGNOSIS — N18.30 CKD (CHRONIC KIDNEY DISEASE) STAGE 3, GFR 30-59 ML/MIN (HCC): ICD-10-CM

## 2018-12-13 DIAGNOSIS — I50.22 CHRONIC SYSTOLIC HEART FAILURE (HCC): ICD-10-CM

## 2018-12-13 DIAGNOSIS — E66.8 MODERATE OBESITY: ICD-10-CM

## 2018-12-13 DIAGNOSIS — I10 ESSENTIAL HYPERTENSION: ICD-10-CM

## 2018-12-13 DIAGNOSIS — I25.10 CORONARY ARTERY DISEASE INVOLVING NATIVE CORONARY ARTERY OF NATIVE HEART WITHOUT ANGINA PECTORIS: ICD-10-CM

## 2018-12-13 DIAGNOSIS — I42.8 NONISCHEMIC CARDIOMYOPATHY (HCC): Primary | ICD-10-CM

## 2018-12-13 PROCEDURE — 1036F TOBACCO NON-USER: CPT | Performed by: INTERNAL MEDICINE

## 2018-12-13 PROCEDURE — G8484 FLU IMMUNIZE NO ADMIN: HCPCS | Performed by: INTERNAL MEDICINE

## 2018-12-13 PROCEDURE — 99214 OFFICE O/P EST MOD 30 MIN: CPT | Performed by: INTERNAL MEDICINE

## 2018-12-13 PROCEDURE — G8427 DOCREV CUR MEDS BY ELIG CLIN: HCPCS | Performed by: INTERNAL MEDICINE

## 2018-12-13 PROCEDURE — G8598 ASA/ANTIPLAT THER USED: HCPCS | Performed by: INTERNAL MEDICINE

## 2018-12-13 PROCEDURE — 93000 ELECTROCARDIOGRAM COMPLETE: CPT | Performed by: INTERNAL MEDICINE

## 2018-12-13 PROCEDURE — G8417 CALC BMI ABV UP PARAM F/U: HCPCS | Performed by: INTERNAL MEDICINE

## 2018-12-13 NOTE — PROGRESS NOTES
OUTPATIENT CARDIOLOGY FOLLOW-UP    Name: Seun Ibarra    Age: 43 y.o. Primary Care Physician: Alvarado Enriquez MD    Date of Service: 12/13/2018    Chief Complaint: Nonischemic cardiomyopathy, coronary atherosclerosis, hypertension, chronic kidney disease, moderate obesity    Interim History: Since his most recent evaluation, the patient remains stable from a cardiovascular standpoint with no interim symptoms of decompensated left ventricle systolic dysfunction or volume overload. While borderline systolic blood pressures are presently noted, upon review of most recent office assessment, acceptable blood pressure has been maintained on his existing medical regimen. Most recent laboratory assessment demonstrates his chronic kidney disease to remain stable. 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 He denies any arrhythmia related symptoms nor discharge of his implantable cardioverter defibrillator with episodes of untreated nonsustained ventricular tachycardia detected with no requirements of device intervention and no reported ventricular pacing. Review of Systems: The remainder of a complete multisystem review including consitutional, central nervous, respiratory, circulatory, gastrointestinal, genitourinary, endocrinologic, hematologic, musculoskeletal and psychiatric are negative.     Past Medical History:  Past Medical History:   Diagnosis Date    AICD (automatic cardioverter/defibrillator) present     Asthma     CAD (coronary artery disease)     Cardiac LV ejection fraction 10-20%     CHF (congestive heart failure) (HCC)     CKD (chronic kidney disease)     COPD (chronic obstructive pulmonary disease) (HCC)     Depression     Diverticulitis     GERD (gastroesophageal reflux disease)     Hyperlipidemia     Hypertension     Nonischemic cardiomyopathy ([facility] [address]     Panic attacks     Sleep apnea        Past Surgical History:  Past Surgical History:   Procedure Laterality Date    CARDIAC DEFIBRILLATOR PLACEMENT Left 01/27/2017    [employer] single lead ICD,     DIAGNOSTIC CARDIAC CATH LAB PROCEDURE  2015    [facility]     [place] COLONOSCOPY FLX DX W/COLLJ SPEC WHEN PFRMD N/A 9/6/2018    COLONOSCOPY performed by Raul Cartagena MD at [facility] ENDOSCOPY       Family History:  Family History   Problem Relation Age of Onset    Cancer Father        Social History:  Social History     Social History    Marital status: Single     Spouse name: N/A    Number of children: N/A    Years of education: N/A     Occupational History    Not on file.      Social History Main Topics    Smoking status: Never Smoker    Smokeless tobacco: Never Used      Comment: Patient counseled to remain smoke free    Alcohol use Yes      Comment: 2-3 times monthly    Drug use: Yes     Types: Marijuana      Comment: 9/5/2018    Sexual activity: Yes     Partners: Female      Comment:  for 7 years     Other Topics Concern    Not on file     Social History Narrative    No narrative on file       Allergies:  No Known Allergies    Current Medications:  Current Outpatient Prescriptions   Medication Sig Dispense Refill    furosemide (LASIX) 40 MG tablet TAKE ONE TABLET BY MOUTH EVERY DAY 30 tablet 0    carvedilol (COREG) 25 MG tablet TAKE TWO TABLETS BY MOUTH TWO TIMES A DAY WITH MEALS 120 tablet 4    atorvastatin (LIPITOR) 20 MG tablet TAKE ONE TABLET BY MOUTH EVERY DAY 30 tablet 2    hydrALAZINE (APRESOLINE) 25 MG tablet TAKE THREE (3) TABLETS BY MOUTH THREE TIMES DAILY 270 tablet 3    isosorbide mononitrate (IMDUR) 120 MG extended release tablet TAKE ONE TABLET BY MOUTH EVERY DAY 90 tablet 3    ASPIRIN LOW DOSE 81 MG chewable tablet CHEW AND SWALLOW ONE TABLET BY MOUTH EVERY DAY 90 tablet 3    CORLANOR 5 MG TABS tablet TAKE ONE TABLET BY MOUTH TWICE A DAY WITH MEALS 60

## 2019-03-06 ENCOUNTER — NURSE ONLY (OUTPATIENT)
Dept: NON INVASIVE DIAGNOSTICS | Age: 44
End: 2019-03-06
Payer: MEDICAID

## 2019-03-06 DIAGNOSIS — Z95.810 SINGLE IMPLANTABLE CARDIOVERTER-DEFIBRILLATOR (ICD) IN SITU: Primary | ICD-10-CM

## 2019-03-06 DIAGNOSIS — I42.8 NON-ISCHEMIC CARDIOMYOPATHY (HCC): ICD-10-CM

## 2019-03-06 PROCEDURE — 93295 DEV INTERROG REMOTE 1/2/MLT: CPT | Performed by: INTERNAL MEDICINE

## 2019-03-06 PROCEDURE — 93296 REM INTERROG EVL PM/IDS: CPT | Performed by: INTERNAL MEDICINE

## 2019-03-08 NOTE — PROGRESS NOTES
See [facility] [place] report. Remote monitoring reviewed over a 90 day period. End of 90 day monitoring period date of service 3. 6.2019.

## 2019-06-07 ENCOUNTER — NURSE ONLY (OUTPATIENT)
Dept: NON INVASIVE DIAGNOSTICS | Age: 44
End: 2019-06-07
Payer: MEDICAID

## 2019-06-07 DIAGNOSIS — Z95.810 SINGLE IMPLANTABLE CARDIOVERTER-DEFIBRILLATOR (ICD) IN SITU: Primary | ICD-10-CM

## 2019-06-07 DIAGNOSIS — I42.8 NON-ISCHEMIC CARDIOMYOPATHY (HCC): ICD-10-CM

## 2019-06-07 PROCEDURE — 93296 REM INTERROG EVL PM/IDS: CPT | Performed by: INTERNAL MEDICINE

## 2019-06-07 PROCEDURE — 93295 DEV INTERROG REMOTE 1/2/MLT: CPT | Performed by: INTERNAL MEDICINE

## 2019-06-20 ENCOUNTER — OFFICE VISIT (OUTPATIENT)
Dept: CARDIOLOGY CLINIC | Age: 44
End: 2019-06-20
Payer: MEDICAID

## 2019-06-20 VITALS
HEIGHT: 68 IN | WEIGHT: 206 LBS | SYSTOLIC BLOOD PRESSURE: 136 MMHG | DIASTOLIC BLOOD PRESSURE: 84 MMHG | BODY MASS INDEX: 31.22 KG/M2 | HEART RATE: 61 BPM

## 2019-06-20 DIAGNOSIS — I42.8 NONISCHEMIC CARDIOMYOPATHY (HCC): Primary | ICD-10-CM

## 2019-06-20 DIAGNOSIS — E78.00 PURE HYPERCHOLESTEROLEMIA: ICD-10-CM

## 2019-06-20 DIAGNOSIS — N18.30 CKD (CHRONIC KIDNEY DISEASE) STAGE 3, GFR 30-59 ML/MIN (HCC): ICD-10-CM

## 2019-06-20 DIAGNOSIS — I25.10 CORONARY ARTERY DISEASE INVOLVING NATIVE CORONARY ARTERY OF NATIVE HEART WITHOUT ANGINA PECTORIS: ICD-10-CM

## 2019-06-20 DIAGNOSIS — I50.22 CHRONIC SYSTOLIC HEART FAILURE (HCC): ICD-10-CM

## 2019-06-20 DIAGNOSIS — E66.8 MODERATE OBESITY: ICD-10-CM

## 2019-06-20 DIAGNOSIS — I10 ESSENTIAL HYPERTENSION: ICD-10-CM

## 2019-06-20 PROCEDURE — G8598 ASA/ANTIPLAT THER USED: HCPCS | Performed by: INTERNAL MEDICINE

## 2019-06-20 PROCEDURE — G8417 CALC BMI ABV UP PARAM F/U: HCPCS | Performed by: INTERNAL MEDICINE

## 2019-06-20 PROCEDURE — 99214 OFFICE O/P EST MOD 30 MIN: CPT | Performed by: INTERNAL MEDICINE

## 2019-06-20 PROCEDURE — G8427 DOCREV CUR MEDS BY ELIG CLIN: HCPCS | Performed by: INTERNAL MEDICINE

## 2019-06-20 PROCEDURE — 1036F TOBACCO NON-USER: CPT | Performed by: INTERNAL MEDICINE

## 2019-06-20 PROCEDURE — 93000 ELECTROCARDIOGRAM COMPLETE: CPT | Performed by: INTERNAL MEDICINE

## 2019-06-20 RX ORDER — FUROSEMIDE 40 MG/1
TABLET ORAL
Qty: 30 TABLET | Refills: 8 | Status: SHIPPED
Start: 2019-06-20 | End: 2020-03-30 | Stop reason: SDUPTHER

## 2019-06-20 NOTE — PROGRESS NOTES
OUTPATIENT CARDIOLOGY FOLLOW-UP    Name: Maite Felix    Age: 37 y.o. Primary Care Physician: Maria Del Carmen Cornejo MD    Date of Service: 6/20/2019    Chief Complaint: Nonischemic cardiomyopathy, chronic systolic heart failure, coronary atherosclerosis, hypertension, chronic kidney disease, moderate obesity    Interim History: As his most recent evaluation, the patient remains symptomatically stable from a cardiovascular standpoint with no interim symptoms of decompensated left ventricular systolic dysfunction or volume overload. Unfortunately, within the past week he has been without his diuretics following the completion of his most recent prescription and lack of seeking refills. At the time of his assessment while his volume status remains stable, a slight elevation of his systolic blood pressure is noted compared to that of ideal proportions. He denies any symptoms of anginal-like chest discomfort or other ischemic equivalents nor arrhythmia related manifestations with no noted discharge of his implantable cardioverter defibrillator. At the time of most recent device interrogation, appropriate device function was documented with episodes of nonsustained ventricular tachycardia and not necessitating device intervention. He has undergone no interim available reassessment of his renal function in the face of his chronic kidney disease. Review of Systems: The remainder of a complete multisystem review including consitutional, central nervous, respiratory, circulatory, gastrointestinal, genitourinary, endocrinologic, hematologic, musculoskeletal and psychiatric are negative.     Past Medical History:  Past Medical History:   Diagnosis Date    AICD (automatic cardioverter/defibrillator) present     Asthma     CAD (coronary artery disease)     Cardiac LV ejection fraction 10-20%     CHF (congestive heart failure) (HCC)     CKD (chronic kidney disease)     COPD (chronic obstructive pulmonary Relationship status: Not on file    Intimate partner violence:     Fear of current or ex partner: Not on file     Emotionally abused: Not on file     Physically abused: Not on file     Forced sexual activity: Not on file   Other Topics Concern    Not on file   Social History Narrative    Not on file       Allergies:  No Known Allergies    Current Medications:  Current Outpatient Medications   Medication Sig Dispense Refill    furosemide (LASIX) 40 MG tablet TAKE ONE TABLET BY MOUTH EVERY DAY 30 tablet 8    carvedilol (COREG) 25 MG tablet TAKE TWO TABLETS BY MOUTH TWO TIMES A DAY WITH MEALS 180 tablet 3    atorvastatin (LIPITOR) 20 MG tablet TAKE ONE TABLET BY MOUTH EVERY DAY 90 tablet 3    hydrALAZINE (APRESOLINE) 25 MG tablet TAKE THREE (3) TABLETS BY MOUTH THREE TIMES DAILY 270 tablet 3    isosorbide mononitrate (IMDUR) 120 MG extended release tablet TAKE ONE TABLET BY MOUTH EVERY DAY 90 tablet 3    ASPIRIN LOW DOSE 81 MG chewable tablet CHEW AND SWALLOW ONE TABLET BY MOUTH EVERY DAY 90 tablet 3    CORLANOR 5 MG TABS tablet TAKE ONE TABLET BY MOUTH TWICE A DAY WITH MEALS 60 tablet 11    Handicap Placard MISC by Does not apply route Patient cannot walk 200 ft without stopping to rest.    Expiration 7/2018 1 each 0    albuterol sulfate HFA (PROVENTIL HFA) 108 (90 BASE) MCG/ACT inhaler Inhale 2 puffs into the lungs every 6 hours as needed for Wheezing 1 Inhaler 0    Blood Pressure KIT Upper arm kit; use daily as directed. Diagnosis uncontrolled hypertension, symptomatic, CKD 3 1 kit 0     No current facility-administered medications for this visit. Physical Exam:  /84   Pulse 61   Ht 5' 8\" (1.727 m)   Wt 206 lb (93.4 kg)   BMI 31.32 kg/m²   Wt Readings from Last 3 Encounters:   06/20/19 206 lb (93.4 kg)   12/13/18 203 lb (92.1 kg)   09/06/18 206 lb 8 oz (93.7 kg)     The patient is awake, alert and in no discomfort or distress. No gross musculoskeletal deformity is present.  No significant skin or nail changes are present. Gross examination of head, eyes, nose and throat are negative. Jugular venous pressure is normal and no carotid bruits are present. Normal respiratory effort is noted with no accessory muscle usage present. Lung fields are clear to ascultation. Cardiac examination is notable for a regular rate and rhythm with no palpable thrill. No gallop rhythm or cardiac murmur are identified. A benign abdominal examination is present with no masses or organomegaly. Intact pulses are present throughout all extremities and no peripheral edema is present. No focal neurologic deficits are present. Laboratory Tests:  Lab Results   Component Value Date    CREATININE 1.6 (H) 08/16/2018    BUN 10 08/16/2018     08/16/2018    K 3.4 (L) 08/16/2018     08/16/2018    CO2 26 08/16/2018     No results found for: BNP  Lab Results   Component Value Date    WBC 7.3 08/16/2018    RBC 5.27 08/16/2018    HGB 14.9 08/16/2018    HCT 46.1 08/16/2018    MCV 87.5 08/16/2018    MCH 28.3 08/16/2018    MCHC 32.3 08/16/2018    RDW 14.4 08/16/2018     08/16/2018    MPV 10.9 08/16/2018     No results for input(s): ALKPHOS, ALT, AST, PROT, BILITOT, BILIDIR, LABALBU in the last 72 hours.   Lab Results   Component Value Date    MG 2.1 01/27/2017     Lab Results   Component Value Date    PROTIME 12.2 01/28/2017    INR 1.1 01/28/2017     Lab Results   Component Value Date    TSH 1.070 08/27/2015     No components found for: CHLPL  Lab Results   Component Value Date    TRIG 132 10/03/2017    TRIG 139 05/10/2016    TRIG 207 (H) 01/16/2015     Lab Results   Component Value Date    HDL 39 10/03/2017    HDL 32 05/10/2016    HDL 38 01/16/2015     Lab Results   Component Value Date    LDLCALC 134 (H) 10/03/2017    LDLCALC 151 (H) 05/10/2016    LDLCALC 149 (H) 01/16/2015       Cardiac Tests:  ECG: A resting electrocardiogram demonstrates evidence of sinus rhythm with voltage criteria for left ventricular hypertrophy and nonspecific ST changes      ASSESSMENT / PLAN: On a clinical basis, the patient remains symptomatically stable from a cardiovascular standpoint in the face of his nonischemic cardiomyopathy with no clinical evidence of volume overload. His mild blood pressure elevation may be in part related to the absence of diuretics over the past week following the completion of his most recent available refills prior to his present assessment. I have extensively discussed his needs of continued medications and avoidance of future lack of refills to reduce risk of decompensation. Additionally have recommended continued appropriate lifestyle modification to achieve weight reduction which will benefit diastolic cardiac performance as well as assisting to reduce risk of the development of obstructive sleep apnea associated with further decompensation of his cardiomyopathy and increased risk of atrial arrhythmias. Continue aggressive risk factor modification of both blood pressure and serum lipids will remain essential to reducing risk of future atherosclerotic development. I presently plan his clinical reassessment in 6 months and would happily reassess him in the interim should additional cardiovascular difficulties or concerns arise. The patient's current medication list, allergies, problem list and results of all previously ordered testing were reviewed at today's visit. Follow-up office visit in 6 months      Note: This report was completed using computerized voice recognition software. Every effort has been made to ensure accuracy, however; and invert and computerized transcription errors may be present. Alpesh Pandya.  Cheyenne Bhatt, [address] [place] [address] Cardiology    An electronic copy of this follow-up progress note was forwarded to Dr. Sharon Gutierrez

## 2019-06-20 NOTE — PATIENT INSTRUCTIONS
Patient Education        Heart-Healthy Diet: Care Instructions  Your Care Instructions    A heart-healthy diet has lots of vegetables, fruits, nuts, beans, and whole grains, and is low in salt. It limits foods that are high in saturated fat, such as meats, cheeses, and fried foods. It may be hard to change your diet, but even small changes can lower your risk of heart attack and heart disease. Follow-up care is a key part of your treatment and safety. Be sure to make and go to all appointments, and call your doctor if you are having problems. It's also a good idea to know your test results and keep a list of the medicines you take. How can you care for yourself at home? Watch your portions  · Learn what a serving is. A \"serving\" and a \"portion\" are not always the same thing. Make sure that you are not eating larger portions than are recommended. For example, a serving of pasta is ½ cup. A serving size of meat is 2 to 3 ounces. A 3-ounce serving is about the size of a deck of cards. Measure serving sizes until you are good at [place]" them. Keep in mind that restaurants often serve portions that are 2 or 3 times the size of one serving. · To keep your energy level up and keep you from feeling hungry, eat often but in smaller portions. · Eat only the number of calories you need to stay at a healthy weight. If you need to lose weight, eat fewer calories than your body burns (through exercise and other physical activity). Eat more fruits and vegetables  · Eat a variety of fruit and vegetables every day. Dark green, deep orange, red, or yellow fruits and vegetables are especially good for you. Examples include spinach, carrots, peaches, and berries. · Keep carrots, celery, and other veggies handy for snacks. Buy fruit that is in season and store it where you can see it so that you will be tempted to eat it. · Cook dishes that have a lot of veggies in them, such as stir-fries and soups.   Limit saturated and trans fat  · Read food labels, and try to avoid saturated and trans fats. They increase your risk of heart disease. Trans fat is found in many processed foods such as cookies and crackers. · Use olive or canola oil when you cook. Try cholesterol-lowering spreads, such as Benecol or Take Control. · Bake, broil, grill, or steam foods instead of frying them. · Choose lean meats instead of high-fat meats such as hot dogs and sausages. Cut off all visible fat when you prepare meat. · Eat fish, skinless poultry, and meat alternatives such as soy products instead of high-fat meats. Soy products, such as tofu, may be especially good for your heart. · Choose low-fat or fat-free milk and dairy products. Eat fish  · Eat at least two servings of fish a week. Certain fish, such as salmon and tuna, contain omega-3 fatty acids, which may help reduce your risk of heart attack. Eat foods high in fiber  · Eat a variety of grain products every day. Include whole-grain foods that have lots of fiber and nutrients. Examples of whole-grain foods include oats, whole wheat bread, and brown rice. · Buy whole-grain breads and cereals, instead of white bread or pastries. Limit salt and sodium  · Limit how much salt and sodium you eat to help lower your blood pressure. · Taste food before you salt it. Add only a little salt when you think you need it. With time, your taste buds will adjust to less salt. · Eat fewer snack items, fast foods, and other high-salt, processed foods. Check food labels for the amount of sodium in packaged foods. · Choose low-sodium versions of canned goods (such as soups, vegetables, and beans). Limit sugar  · Limit drinks and foods with added sugar. These include candy, desserts, and soda pop. Limit alcohol  · Limit alcohol to no more than 2 drinks a day for men and 1 drink a day for women. Too much alcohol can cause health problems. When should you call for help?   Watch closely for changes in your [employer], and be sure to contact your doctor if:    · You would like help planning heart-healthy meals. Where can you learn more? Go to https://chpepiceweb.[email or web address]. org and sign in to your [employer] account. Enter V137 in the [employer] box to learn more about \"Heart-Healthy Diet: Care Instructions. \"     If you do not have an account, please click on the \"Sign Up Now\" link. Current as of: July 22, 2018  Content Version: 12.0  © 2867-6143 Healthwise, Incorporated. Care instructions adapted under license by [facility] [place] ([facility] and [facility]). If you have questions about a medical condition or this instruction, always ask your healthcare professional. [address] any warranty or liability for your use of this information.

## 2019-06-26 ENCOUNTER — TELEPHONE (OUTPATIENT)
Dept: NON INVASIVE DIAGNOSTICS | Age: 44
End: 2019-06-26

## 2019-06-26 NOTE — TELEPHONE ENCOUNTER
We have received your remote transmission. Our staff will contact you if there is anything that needs to be discussed. Your next appointment is 09/11/2019 remote transmission from home    Pt is wireless    Spoke with pt and verified remote transmission from home. Talita Carpio

## 2019-06-26 NOTE — TELEPHONE ENCOUNTER
----- Message from Israel Huang RN sent at 6/26/2019  2:01 PM EDT -----  Successful transmission received. Please call patient and give next appointment.

## 2019-09-11 ENCOUNTER — NURSE ONLY (OUTPATIENT)
Dept: NON INVASIVE DIAGNOSTICS | Age: 44
End: 2019-09-11
Payer: MEDICAID

## 2019-09-11 DIAGNOSIS — I42.8 NON-ISCHEMIC CARDIOMYOPATHY (HCC): ICD-10-CM

## 2019-09-11 DIAGNOSIS — Z95.810 SINGLE IMPLANTABLE CARDIOVERTER-DEFIBRILLATOR (ICD) IN SITU: Primary | ICD-10-CM

## 2019-09-11 PROCEDURE — 93296 REM INTERROG EVL PM/IDS: CPT | Performed by: INTERNAL MEDICINE

## 2019-09-11 PROCEDURE — 93295 DEV INTERROG REMOTE 1/2/MLT: CPT | Performed by: INTERNAL MEDICINE

## 2019-09-24 ENCOUNTER — TELEPHONE (OUTPATIENT)
Dept: NON INVASIVE DIAGNOSTICS | Age: 44
End: 2019-09-24

## 2019-11-19 ENCOUNTER — HOSPITAL ENCOUNTER (EMERGENCY)
Facility: HOSPITAL | Age: 44
Discharge: HOME | End: 2019-11-19
Attending: EMERGENCY MEDICINE | Admitting: EMERGENCY MEDICINE

## 2019-11-19 ENCOUNTER — APPOINTMENT (OUTPATIENT)
Dept: GENERAL RADIOLOGY | Facility: HOSPITAL | Age: 44
End: 2019-11-19

## 2019-11-19 VITALS
HEART RATE: 100 BPM | DIASTOLIC BLOOD PRESSURE: 83 MMHG | RESPIRATION RATE: 20 BRPM | HEIGHT: 75 IN | OXYGEN SATURATION: 98 % | SYSTOLIC BLOOD PRESSURE: 131 MMHG | WEIGHT: 225 LBS | BODY MASS INDEX: 27.98 KG/M2 | TEMPERATURE: 99.9 F

## 2019-11-19 DIAGNOSIS — J20.9 ACUTE BRONCHITIS, UNSPECIFIED ORGANISM: Primary | ICD-10-CM

## 2019-11-19 LAB
FLUAV AG NPH QL: NEGATIVE
FLUBV AG NPH QL IA: NEGATIVE

## 2019-11-19 PROCEDURE — 71046 X-RAY EXAM CHEST 2 VIEWS: CPT

## 2019-11-19 PROCEDURE — 99283 EMERGENCY DEPT VISIT LOW MDM: CPT

## 2019-11-19 PROCEDURE — 94640 AIRWAY INHALATION TREATMENT: CPT

## 2019-11-19 PROCEDURE — 94799 UNLISTED PULMONARY SVC/PX: CPT

## 2019-11-19 PROCEDURE — 87804 INFLUENZA ASSAY W/OPTIC: CPT | Performed by: EMERGENCY MEDICINE

## 2019-11-19 RX ORDER — ALBUTEROL SULFATE 90 UG/1
2 AEROSOL, METERED RESPIRATORY (INHALATION) EVERY 6 HOURS PRN
Qty: 18 G | Refills: 0 | Status: SHIPPED | OUTPATIENT
Start: 2019-11-19 | End: 2020-12-18

## 2019-11-19 RX ORDER — IPRATROPIUM BROMIDE AND ALBUTEROL SULFATE 2.5; .5 MG/3ML; MG/3ML
3 SOLUTION RESPIRATORY (INHALATION) ONCE
Status: COMPLETED | OUTPATIENT
Start: 2019-11-19 | End: 2019-11-19

## 2019-11-19 RX ORDER — GUAIFENESIN AND DEXTROMETHORPHAN HYDROBROMIDE 600; 30 MG/1; MG/1
1 TABLET, EXTENDED RELEASE ORAL 2 TIMES DAILY PRN
Qty: 20 TABLET | Refills: 0 | Status: SHIPPED | OUTPATIENT
Start: 2019-11-19 | End: 2020-12-18

## 2019-11-19 RX ADMIN — IPRATROPIUM BROMIDE AND ALBUTEROL SULFATE 3 ML: 2.5; .5 SOLUTION RESPIRATORY (INHALATION) at 09:21

## 2019-11-19 NOTE — DISCHARGE INSTRUCTIONS
Take medications as prescribed.  Return to the emergency department for worsening symptoms, fever, or other concern.  Call the patient liaison for assistance in obtaining a primary care physician for follow-up.

## 2019-11-19 NOTE — ED PROVIDER NOTES
" EMERGENCY DEPARTMENT ENCOUNTER    CHIEF COMPLAINT  Chief Complaint: cough  History given by: patient  History limited by: nothing  Room Number: 04/04  PMD: Provider, No Known      HPI:  Pt is a 43 y.o. male who presents complaining of a productive cough with yellow sputum that began a couple days ago. The patient also complains of associated rhinorrhea, congestion, chills, mild wheezing, mild anterior chest \"soreness\" that occurs with coughing, and mild shortness of breath with coughing and exertion. The patient reports his roommate has had similar symptoms. The patient denies associated fever, leg swelling, or leg pain. The patient admits to tobacco use. There are no other complaints at this time.     Duration: couple days  Onset: gradual  Timing: constant  Location: respiratory  Quality: productive cough with yellow sputum   Intensity/Severity: moderate  Progression: unchanged  Associated Symptoms: rhinorrhea, congestion, chills, mild wheezing, mild anterior chest \"soreness\" that occurs with coughing, and mild shortness of breath with coughing and exertion.   Aggravating Factors: none specified  Alleviating Factors: none specified  Previous Episodes: none specified  Treatment before arrival: none specified    PAST MEDICAL HISTORY  Active Ambulatory Problems     Diagnosis Date Noted   • No Active Ambulatory Problems     Resolved Ambulatory Problems     Diagnosis Date Noted   • No Resolved Ambulatory Problems     No Additional Past Medical History       PAST SURGICAL HISTORY  History reviewed. No pertinent surgical history.    FAMILY HISTORY  History reviewed. No pertinent family history.    SOCIAL HISTORY  Social History     Socioeconomic History   • Marital status: Single     Spouse name: Not on file   • Number of children: Not on file   • Years of education: Not on file   • Highest education level: Not on file   Tobacco Use   • Smoking status: Current Every Day Smoker   Substance and Sexual Activity   • Alcohol " "use: No   • Drug use: No   • Sexual activity: Defer       ALLERGIES  Patient has no known allergies.    REVIEW OF SYSTEMS  Review of Systems   Constitutional: Positive for chills. Negative for activity change, appetite change and fever.   HENT: Positive for congestion and rhinorrhea. Negative for sore throat.    Eyes: Negative.    Respiratory: Positive for cough (productive cough with yellow sputum), shortness of breath (mild shortness of breath with coughing and exertion) and wheezing (mild).    Cardiovascular: Positive for chest pain (mild anterior chest \"soreness\" that occurs with coughing). Negative for leg swelling.   Gastrointestinal: Negative for abdominal pain, diarrhea and vomiting.   Endocrine: Negative.    Genitourinary: Negative for decreased urine volume and dysuria.   Musculoskeletal: Negative for myalgias (BLE) and neck pain.   Skin: Negative for rash and wound.   Allergic/Immunologic: Negative.    Neurological: Negative for weakness, numbness and headaches.   Hematological: Negative.    Psychiatric/Behavioral: Negative.    All other systems reviewed and are negative.      PHYSICAL EXAM  ED Triage Vitals [11/19/19 0856]   Temp Heart Rate Resp BP SpO2   99.9 °F (37.7 °C) 112 18 -- 97 %      Temp src Heart Rate Source Patient Position BP Location FiO2 (%)   Tympanic -- -- -- --       Physical Exam   Constitutional: He is oriented to person, place, and time.  Non-toxic appearance. No distress.   HENT:   Head: Normocephalic and atraumatic.   Mouth/Throat: Oropharynx is clear and moist.   Eyes: EOM are normal. Pupils are equal, round, and reactive to light.   Neck: Normal range of motion. Neck supple.   There is tenderness of lymphadenophathy in posterior lateral left neck.   Cardiovascular: Regular rhythm and normal heart sounds. Tachycardia present. Exam reveals no gallop and no friction rub.   No murmur heard.  HR in the 110's.   Pulmonary/Chest: Effort normal and breath sounds normal. No respiratory " distress. He has no decreased breath sounds. He has no wheezes. He has no rhonchi. He has no rales. He exhibits tenderness (across anterior chest wall).   Abdominal: Soft. Bowel sounds are normal. He exhibits no distension and no mass. There is no tenderness. There is no rebound and no guarding.   Musculoskeletal: Normal range of motion. He exhibits no edema (pedal) or tenderness (calf).   Neurological: He is alert and oriented to person, place, and time. He has normal sensation and normal strength.   Skin: Skin is warm and dry.   Psychiatric: Mood and affect normal.   Vitals reviewed.      LAB RESULTS  Lab Results (last 24 hours)     Procedure Component Value Units Date/Time    Influenza Antigen, Rapid - Swab, Nasopharynx [[id number]]  (Normal) Collected:  11/19/19 0908    Specimen:  Swab from Nasopharynx Updated:  11/19/19 0946     Influenza A Ag, EIA Negative     Influenza B Ag, EIA Negative          I ordered the above labs and reviewed the results    RADIOLOGY  Xr Chest 2 View    Result Date: 11/19/2019  Narrative: XR CHEST 2 VW-  Clinical: Cough and chills  COMPARISON: None  FINDINGS: The heart size is normal. The mediastinum and maykel are satisfactory in appearance.  No effusion, edema or consolidation.  There are several, scattered, tiny punctate and linear opacities demonstrated at the lung bases, no larger than a few millimeters. These could represent mucous plugging and can be seen in asthma/bronchitis. Lungs otherwise clear. The remainder the examination is unremarkable.  This report was finalized on 11/19/2019 10:04 AM by Dr. Russell Fox M.D.      I ordered the above noted radiological studies. Interpreted by radiologist. Reviewed by me in PACS.       PROCEDURES  Procedures      PROGRESS AND CONSULTS     0907 Ordered CXR and Influenza Swab for further evaluation. Ordered Duo-Neb to treat patient's respiratory symptoms.    1047 Rechecked the patient who is resting comfortably and in NAD. Patient is  stable. Informed the patient of his negative CXR and Influenza test. Discussed the plan for discharge with a prescription for Mucinex DM and an Albuterol inhaler and instructions to f/u with a PMD for further evaluation and management. Strict RTER warnings given. Pt understands and agrees with the plan, all questions answered.      MEDICAL DECISION MAKING  Results were reviewed/discussed with the patient and they were also made aware of online access. Pt also made aware that some labs, such as cultures, will not be resulted during ER visit and follow up with PMD is necessary.     MDM  Number of Diagnoses or Management Options     Amount and/or Complexity of Data Reviewed  Clinical lab tests: ordered and reviewed (Influenza Test: Negative)  Tests in the radiology section of CPT®: ordered and reviewed (Xr Chest 2 View: The heart size is normal. The mediastinum and maykel are satisfactory in appearance.  No effusion, edema or consolidation.  There are several, scattered, tiny punctate and linear opacities demonstrated at the lung bases, no larger than a few millimeters. These could represent mucous plugging and can be seen in asthma/bronchitis. Lungs otherwise clear. The remainder the examination is unremarkable.)  Decide to obtain previous medical records or to obtain history from someone other than the patient: yes  Review and summarize past medical records: yes (He has no pertinent previous records in [facility].)  Independent visualization of images, tracings, or specimens: yes    Patient Progress  Patient progress: stable         DIAGNOSIS  Final diagnoses:   Acute bronchitis, unspecified organism       DISPOSITION  DISCHARGE    Patient discharged in stable condition.    Reviewed implications of results, diagnosis, meds, responsibility to follow up, warning signs and symptoms of possible worsening, potential complications and reasons to return to ER, including any new or worsening symptoms.    Patient/Family voiced  understanding of above instructions.    Discussed plan for discharge, as there is no emergent indication for admission. Patient referred to primary care provider for BP management due to today's BP. Pt/family is agreeable and understands need for follow up and repeat testing.  Pt is aware that discharge does not mean that nothing is wrong but it indicates no emergency is present that requires admission and they must continue care with follow-up as given below or physician of their choice.     FOLLOW-UP  PATIENT LIAISON [facility] [address]  [phone]  Call today           Medication List      New Prescriptions    albuterol sulfate  (90 Base) MCG/ACT inhaler  Commonly known as:  PROVENTIL HFA;VENTOLIN HFA;PROAIR HFA  Inhale 2 puffs Every 6 (Six) Hours As Needed for Shortness of Air.     guaifenesin-dextromethorphan  MG tablet sustained-release 12 hour   tablet  Take 1 tablet by mouth 2 (Two) Times a Day As Needed (cough).              Latest Documented Vital Signs:  As of 11:00 AM  BP- 131/83 HR- 100 Temp- 99.9 °F (37.7 °C) (Tympanic) O2 sat- 98%    --  Documentation assistance provided by ruperto Elise for Dr. Rickey MD.  Information recorded by the scribe was done at my direction and has been verified and validated by me.     Maral Elise  11/19/19 1100       Beto Lang MD  11/19/19 4528

## 2019-12-11 ENCOUNTER — NURSE ONLY (OUTPATIENT)
Dept: NON INVASIVE DIAGNOSTICS | Age: 44
End: 2019-12-11
Payer: MEDICAID

## 2019-12-11 DIAGNOSIS — I42.8 NON-ISCHEMIC CARDIOMYOPATHY (HCC): ICD-10-CM

## 2019-12-11 DIAGNOSIS — Z95.810 SINGLE IMPLANTABLE CARDIOVERTER-DEFIBRILLATOR (ICD) IN SITU: Primary | ICD-10-CM

## 2019-12-11 PROCEDURE — 93295 DEV INTERROG REMOTE 1/2/MLT: CPT | Performed by: INTERNAL MEDICINE

## 2019-12-11 PROCEDURE — 93296 REM INTERROG EVL PM/IDS: CPT | Performed by: INTERNAL MEDICINE

## 2019-12-18 ENCOUNTER — OFFICE VISIT (OUTPATIENT)
Dept: CARDIOLOGY CLINIC | Age: 44
End: 2019-12-18
Payer: MEDICAID

## 2019-12-18 VITALS
SYSTOLIC BLOOD PRESSURE: 180 MMHG | HEIGHT: 68 IN | WEIGHT: 212 LBS | BODY MASS INDEX: 32.13 KG/M2 | HEART RATE: 51 BPM | DIASTOLIC BLOOD PRESSURE: 107 MMHG

## 2019-12-18 DIAGNOSIS — I25.10 CORONARY ARTERY DISEASE INVOLVING NATIVE CORONARY ARTERY OF NATIVE HEART WITHOUT ANGINA PECTORIS: ICD-10-CM

## 2019-12-18 DIAGNOSIS — E66.8 MODERATE OBESITY: ICD-10-CM

## 2019-12-18 DIAGNOSIS — I10 ESSENTIAL HYPERTENSION: ICD-10-CM

## 2019-12-18 DIAGNOSIS — I50.42 CHRONIC COMBINED SYSTOLIC AND DIASTOLIC HEART FAILURE (HCC): ICD-10-CM

## 2019-12-18 DIAGNOSIS — N18.30 CKD (CHRONIC KIDNEY DISEASE) STAGE 3, GFR 30-59 ML/MIN (HCC): ICD-10-CM

## 2019-12-18 DIAGNOSIS — I42.8 NONISCHEMIC CARDIOMYOPATHY (HCC): Primary | ICD-10-CM

## 2019-12-18 DIAGNOSIS — E78.00 PURE HYPERCHOLESTEROLEMIA: ICD-10-CM

## 2019-12-18 PROCEDURE — 93000 ELECTROCARDIOGRAM COMPLETE: CPT | Performed by: INTERNAL MEDICINE

## 2019-12-18 PROCEDURE — 1036F TOBACCO NON-USER: CPT | Performed by: INTERNAL MEDICINE

## 2019-12-18 PROCEDURE — G8598 ASA/ANTIPLAT THER USED: HCPCS | Performed by: INTERNAL MEDICINE

## 2019-12-18 PROCEDURE — G8484 FLU IMMUNIZE NO ADMIN: HCPCS | Performed by: INTERNAL MEDICINE

## 2019-12-18 PROCEDURE — G8417 CALC BMI ABV UP PARAM F/U: HCPCS | Performed by: INTERNAL MEDICINE

## 2019-12-18 PROCEDURE — G8427 DOCREV CUR MEDS BY ELIG CLIN: HCPCS | Performed by: INTERNAL MEDICINE

## 2019-12-18 PROCEDURE — 99214 OFFICE O/P EST MOD 30 MIN: CPT | Performed by: INTERNAL MEDICINE

## 2019-12-20 ENCOUNTER — TELEPHONE (OUTPATIENT)
Dept: NON INVASIVE DIAGNOSTICS | Age: 44
End: 2019-12-20

## 2020-01-10 ENCOUNTER — CARE COORDINATION (OUTPATIENT)
Dept: CARE COORDINATION | Age: 45
End: 2020-01-10

## 2020-01-10 NOTE — CARE COORDINATION
-Called and spoke with the pt to offer care coordination, however, he declined stating that he has not seen his pcp since she moved to [facility] and is looking into a new pcp in [facility] where he lives. -[facility] did provide him with a few [facility] facilities information so that he can call and ask if anyone is taking new patients, as he is not interested in coming to [facility] d/t distance. -[facility] let him know that if he establishes at any of these primary care practices in [facility] and if he is interested in the care coordination program to speak to his new pcp about it. He voiced understanding.

## 2020-02-11 ENCOUNTER — TELEPHONE (OUTPATIENT)
Dept: FAMILY MEDICINE CLINIC | Age: 45
End: 2020-02-11

## 2020-02-11 NOTE — TELEPHONE ENCOUNTER
Patient was last seen 8/2018. Patient was advised that Hugo Tejada is no longer accepting patients from her previous office and since it has been over 1.5 years since he has been seen we can not schedule him with her. I advised that I can schedule him with another physician in the office. Patient refused, said that he will try to find a physician closer to his home.

## 2020-03-11 ENCOUNTER — NURSE ONLY (OUTPATIENT)
Dept: NON INVASIVE DIAGNOSTICS | Age: 45
End: 2020-03-11
Payer: MEDICAID

## 2020-03-11 PROCEDURE — 93295 DEV INTERROG REMOTE 1/2/MLT: CPT | Performed by: INTERNAL MEDICINE

## 2020-03-11 PROCEDURE — 93296 REM INTERROG EVL PM/IDS: CPT | Performed by: INTERNAL MEDICINE

## 2020-03-16 NOTE — PROGRESS NOTES
See [employer] [place] report. Remote monitoring reviewed over a 90 day period. End of 90 day monitoring period date of service 3.11.2020.

## 2020-03-30 RX ORDER — FUROSEMIDE 40 MG/1
TABLET ORAL
Qty: 90 TABLET | Refills: 3 | Status: SHIPPED
Start: 2020-03-30 | End: 2021-04-19

## 2020-03-30 RX ORDER — FUROSEMIDE 40 MG/1
TABLET ORAL
Qty: 90 TABLET | Refills: 3 | Status: SHIPPED
Start: 2020-03-30 | End: 2020-07-29

## 2020-04-23 RX ORDER — CARVEDILOL 25 MG/1
TABLET ORAL
Qty: 180 TABLET | Refills: 3 | Status: SHIPPED
Start: 2020-04-23 | End: 2021-01-18

## 2020-05-22 RX ORDER — IVABRADINE 5 MG/1
TABLET, FILM COATED ORAL
Qty: 180 TABLET | Refills: 3 | Status: SHIPPED
Start: 2020-05-22 | End: 2021-08-20

## 2020-06-10 ENCOUNTER — NURSE ONLY (OUTPATIENT)
Dept: NON INVASIVE DIAGNOSTICS | Age: 45
End: 2020-06-10
Payer: MEDICAID

## 2020-06-10 PROCEDURE — 93296 REM INTERROG EVL PM/IDS: CPT | Performed by: INTERNAL MEDICINE

## 2020-06-10 PROCEDURE — 93295 DEV INTERROG REMOTE 1/2/MLT: CPT | Performed by: INTERNAL MEDICINE

## 2020-06-11 NOTE — PROGRESS NOTES
See [employer] [place] report. Remote monitoring reviewed over a 90 day period. End of 90 day monitoring period date of service 6.10.2020.

## 2020-07-20 RX ORDER — HYDRALAZINE HYDROCHLORIDE 25 MG/1
TABLET, FILM COATED ORAL
Qty: 270 TABLET | Refills: 3 | Status: SHIPPED
Start: 2020-07-20 | End: 2021-04-05

## 2020-07-20 RX ORDER — HYDRALAZINE HYDROCHLORIDE 25 MG/1
TABLET, FILM COATED ORAL
Qty: 270 TABLET | Refills: 3 | Status: SHIPPED
Start: 2020-07-20 | End: 2020-07-20 | Stop reason: SDUPTHER

## 2020-07-29 ENCOUNTER — OFFICE VISIT (OUTPATIENT)
Dept: CARDIOLOGY CLINIC | Age: 45
End: 2020-07-29
Payer: MEDICAID

## 2020-07-29 VITALS
HEART RATE: 64 BPM | BODY MASS INDEX: 33.04 KG/M2 | DIASTOLIC BLOOD PRESSURE: 80 MMHG | SYSTOLIC BLOOD PRESSURE: 122 MMHG | HEIGHT: 68 IN | WEIGHT: 218 LBS

## 2020-07-29 PROCEDURE — 1036F TOBACCO NON-USER: CPT | Performed by: INTERNAL MEDICINE

## 2020-07-29 PROCEDURE — G8417 CALC BMI ABV UP PARAM F/U: HCPCS | Performed by: INTERNAL MEDICINE

## 2020-07-29 PROCEDURE — 93000 ELECTROCARDIOGRAM COMPLETE: CPT | Performed by: INTERNAL MEDICINE

## 2020-07-29 PROCEDURE — 99214 OFFICE O/P EST MOD 30 MIN: CPT | Performed by: INTERNAL MEDICINE

## 2020-07-29 PROCEDURE — G8427 DOCREV CUR MEDS BY ELIG CLIN: HCPCS | Performed by: INTERNAL MEDICINE

## 2020-07-29 NOTE — PROGRESS NOTES
OUTPATIENT CARDIOLOGY FOLLOW-UP    Name: Tea Mayer    Age: 40 y.o. Primary Care Physician: No primary care provider on file. Date of Service: 7/29/2020    Chief Complaint: Nonischemic cardiomyopathy, chronic systolic heart failure, hypertension, chronic kidney disease, moderate obesity    Interim History: Since his most recent evaluation, the patient remains compensated from a cardiovascular standpoint with intermittent symptoms of exertional dyspnea (functional class II) and the recent development of a nonproductive cough in the absence of additional manifestations of decompensated left ventricular systolic dysfunction or volume overload. He denies any anginal-like chest discomfort or other ischemic equivalents and has noted no arrhythmia related manifestations. He relates compliance with medical management in part related to decreasing seeing activities in the face of the COVID-19 pandemic a 5 pound weight gain. At the time of present evaluation he is normotensive. In the absence of present primary medical care, he is undergone no recent laboratory assessment in the face of concomitant chronic kidney disease. Review of Systems: The remainder of a complete multisystem review including consitutional, central nervous, respiratory, circulatory, gastrointestinal, genitourinary, endocrinologic, hematologic, musculoskeletal and psychiatric are negative.     Past Medical History:  Past Medical History:   Diagnosis Date    AICD (automatic cardioverter/defibrillator) present     Asthma     CAD (coronary artery disease)     Cardiac LV ejection fraction 10-20%     [facility]F [facility]congestive [facility] failure) ([facility])     CKD (chronic kidney disease)     COPD (chronic obstructive pulmonary disease) ([facility])     Depression     Diverticulitis     GERD (gastroesophageal reflux disease)     Hyperlipidemia     Hypertension     Nonischemic cardiomyopathy (HCC)     Panic attacks     Sleep apnea        Past file   Other Topics Concern    Not on file   Social History Narrative    Not on file       Allergies:  No Known Allergies    Current Medications:  Current Outpatient Medications   Medication Sig Dispense Refill    hydrALAZINE (APRESOLINE) 25 MG tablet TAKE THREE (3) TABLETS BY MOUTH THREE TIMES DAILY 270 tablet 3    CORLANOR 5 MG TABS tablet TAKE ONE TABLET BY MOUTH TWICE A DAY WITH MEALS 180 tablet 3    carvedilol (COREG) 25 MG tablet TAKE TWO TABLETS BY MOUTH TWO TIMES A DAY WITH MEALS 180 tablet 3    furosemide (LASIX) 40 MG tablet TAKE ONE TABLET BY MOUTH EVERY DAY 90 tablet 3    ASPIRIN LOW DOSE 81 MG chewable tablet CHEW AND SWALLOW ONE TABLET BY MOUTH EVERY DAY 90 tablet 3    atorvastatin (LIPITOR) 20 MG tablet TAKE ONE TABLET BY MOUTH EVERY DAY 90 tablet 3    isosorbide mononitrate (IMDUR) 120 MG extended release tablet TAKE ONE TABLET BY MOUTH EVERY DAY 90 tablet 3    Handicap Placard MISC by Does not apply route Patient cannot walk 200 ft without stopping to rest.    Expiration 7/2018 1 each 0    Blood Pressure KIT Upper arm kit; use daily as directed. Diagnosis uncontrolled hypertension, symptomatic, CKD 3 1 kit 0    albuterol sulfate HFA (PROVENTIL HFA) 108 (90 BASE) MCG/ACT inhaler Inhale 2 puffs into the lungs every 6 hours as needed for Wheezing 1 Inhaler 0     No current facility-administered medications for this visit. Physical Exam:  /80 (Site: Right Upper Arm, Position: Sitting, Cuff Size: Large Adult)   Pulse 64   Ht 5' 8\" (1.727 m)   Wt 218 lb (98.9 kg)   BMI 33.15 kg/m²   Wt Readings from Last 3 Encounters:   07/29/20 218 lb (98.9 kg)   12/18/19 212 lb (96.2 kg)   06/20/19 206 lb (93.4 kg)     The patient is awake, alert and in no discomfort or distress. No gross musculoskeletal deformity is present. No significant skin or nail changes are present. Gross examination of head, eyes, nose and throat are negative.  Jugular venous pressure is normal and no carotid cardiovascular standpoint with mild symptoms of exertional dyspnea and a nonproductive cough in the face of his nonischemic cardiomyopathy and severe left ventricular systolic dysfunction. A component of his symptoms is likely related to his ongoing weight gain and that of moderate obesity with further discussion extensively held regarding his needs of lifestyle modification to achieve weight reduction to benefit the diastolic component of his heart failure management as well as reducing his risk of the development of obstructive sleep apnea with associated adverse cardiovascular effects. Based on duration since most recent objective assessment of left ventricular systolic function, I have recommended a repeat echocardiogram for assessment of left ventricular systolic and diastolic cardiac performance as well as that of laboratory assessment of his renal function and electrolytes in the face of concomitant chronic kidney disease and a proBNP level to optimize heart failure management as necessary. While present blood pressures are significantly improved, continued appropriate monitoring of blood pressure as well as that of serum lipids will remain essential to reducing his risk of future atherosclerotic development. Presently he is devoid of primary medical care and I have discussed his needs of prompt resumption of care with a new primary care provider at his previous location following the relocation of his previous primary care physician. Unless dictated by the findings of his echocardiogram, I plan clinical reassessment in approximately 6 months would happily reassess him in the interim should additional cardiovascular difficulties or concerns arise. The patient's current medication list, allergies, problem list and results of all previously ordered testing were reviewed at today's visit.     Follow-up office visit in 6 months      Note: This report was completed using computerized voice recognition software. Every effort has been made to ensure accuracy, however; and invert and computerized transcription errors may be present. Catarina Sommer.  Khanh Caal, [address] [facility]

## 2020-08-06 ENCOUNTER — TELEPHONE (OUTPATIENT)
Dept: CARDIOLOGY CLINIC | Age: 45
End: 2020-08-06

## 2020-08-06 RX ORDER — POTASSIUM CHLORIDE 20 MEQ/1
20 TABLET, EXTENDED RELEASE ORAL DAILY
COMMUNITY
End: 2020-08-06 | Stop reason: SDUPTHER

## 2020-08-06 RX ORDER — POTASSIUM CHLORIDE 20 MEQ/1
20 TABLET, EXTENDED RELEASE ORAL DAILY
Qty: 90 TABLET | Refills: 3 | Status: SHIPPED
Start: 2020-08-06 | End: 2021-02-17

## 2020-08-31 ENCOUNTER — TELEPHONE (OUTPATIENT)
Dept: CARDIOLOGY | Age: 45
End: 2020-08-31

## 2020-09-10 ENCOUNTER — NURSE ONLY (OUTPATIENT)
Dept: NON INVASIVE DIAGNOSTICS | Age: 45
End: 2020-09-10
Payer: MEDICAID

## 2020-09-10 PROCEDURE — 93296 REM INTERROG EVL PM/IDS: CPT | Performed by: INTERNAL MEDICINE

## 2020-09-10 PROCEDURE — 93295 DEV INTERROG REMOTE 1/2/MLT: CPT | Performed by: INTERNAL MEDICINE

## 2020-09-14 ENCOUNTER — TELEPHONE (OUTPATIENT)
Dept: CARDIOLOGY | Age: 45
End: 2020-09-14

## 2020-09-16 ENCOUNTER — TELEPHONE (OUTPATIENT)
Dept: CARDIOLOGY | Age: 45
End: 2020-09-16

## 2020-09-16 NOTE — TELEPHONE ENCOUNTER
Patient missed his first echo appointment on 9/2 and was rescheduled for today and was a no show no call. Left message for him to please call back.

## 2020-09-17 NOTE — PROGRESS NOTES
See [employer] [place] report. Remote monitoring reviewed over a 90 day period. End of 90 day monitoring period date of service 9.10.2020.

## 2020-09-30 ENCOUNTER — TELEPHONE (OUTPATIENT)
Dept: CARDIOLOGY CLINIC | Age: 45
End: 2020-09-30

## 2020-09-30 ENCOUNTER — HOSPITAL ENCOUNTER (OUTPATIENT)
Dept: CARDIOLOGY | Age: 45
Discharge: HOME | End: 2020-09-30
Payer: MEDICAID

## 2020-09-30 LAB
LV EF: 65 %
LVEF MODALITY: NORMAL

## 2020-09-30 PROCEDURE — 93306 TTE W/DOPPLER COMPLETE: CPT

## 2020-09-30 NOTE — TELEPHONE ENCOUNTER
----- Message from Charu Reyes MD sent at 9/30/2020 12:45 PM EDT -----  Please notify patient that echocardiogram shows significant improvement of heart muscle function with present medications.   Continue as directed and follow-up as scheduled    Called pt re above

## 2020-11-02 RX ORDER — ISOSORBIDE MONONITRATE 120 MG/1
TABLET, EXTENDED RELEASE ORAL
Qty: 90 TABLET | Refills: 3 | Status: SHIPPED
Start: 2020-11-02 | End: 2021-11-01

## 2020-12-11 ENCOUNTER — NURSE ONLY (OUTPATIENT)
Dept: NON INVASIVE DIAGNOSTICS | Age: 45
End: 2020-12-11
Payer: MEDICAID

## 2020-12-11 DIAGNOSIS — Z95.810 SINGLE IMPLANTABLE CARDIOVERTER-DEFIBRILLATOR (ICD) IN SITU: Primary | ICD-10-CM

## 2020-12-11 DIAGNOSIS — I42.8 NON-ISCHEMIC CARDIOMYOPATHY (HCC): ICD-10-CM

## 2020-12-11 PROCEDURE — 93295 DEV INTERROG REMOTE 1/2/MLT: CPT | Performed by: INTERNAL MEDICINE

## 2020-12-11 PROCEDURE — 93296 REM INTERROG EVL PM/IDS: CPT | Performed by: INTERNAL MEDICINE

## 2021-01-15 NOTE — PROGRESS NOTES
See [facility] [place] report. Remote monitoring reviewed over a 90 day period. End of 90 day monitoring period date of service 12.11.2020.

## 2021-01-18 ENCOUNTER — TELEPHONE (OUTPATIENT)
Dept: NON INVASIVE DIAGNOSTICS | Age: 46
End: 2021-01-18

## 2021-01-18 RX ORDER — CARVEDILOL 25 MG/1
TABLET ORAL
Qty: 180 TABLET | Refills: 3 | Status: SHIPPED
Start: 2021-01-18 | End: 2021-07-30

## 2021-01-18 NOTE — TELEPHONE ENCOUNTER
----- Message from Beltran Allen RN sent at 1/15/2021  9:32 AM EST -----  No appointments since 2018.  [place] device

## 2021-02-01 RX ORDER — ASPIRIN 81 MG
TABLET,CHEWABLE ORAL
Qty: 90 TABLET | Refills: 3 | Status: SHIPPED
Start: 2021-02-01 | End: 2021-06-03

## 2021-02-02 ENCOUNTER — OFFICE VISIT (OUTPATIENT)
Dept: NON INVASIVE DIAGNOSTICS | Age: 46
End: 2021-02-02
Payer: MEDICAID

## 2021-02-02 VITALS
SYSTOLIC BLOOD PRESSURE: 132 MMHG | DIASTOLIC BLOOD PRESSURE: 90 MMHG | HEIGHT: 68 IN | BODY MASS INDEX: 33.52 KG/M2 | HEART RATE: 55 BPM | WEIGHT: 221.2 LBS

## 2021-02-02 DIAGNOSIS — I10 ESSENTIAL HYPERTENSION: ICD-10-CM

## 2021-02-02 DIAGNOSIS — I42.8 NONISCHEMIC CARDIOMYOPATHY (HCC): ICD-10-CM

## 2021-02-02 DIAGNOSIS — Z95.810 SINGLE IMPLANTABLE CARDIOVERTER-DEFIBRILLATOR (ICD) IN SITU: Primary | ICD-10-CM

## 2021-02-02 DIAGNOSIS — E66.8 MODERATE OBESITY: ICD-10-CM

## 2021-02-02 PROCEDURE — G8427 DOCREV CUR MEDS BY ELIG CLIN: HCPCS | Performed by: INTERNAL MEDICINE

## 2021-02-02 PROCEDURE — G8417 CALC BMI ABV UP PARAM F/U: HCPCS | Performed by: INTERNAL MEDICINE

## 2021-02-02 PROCEDURE — 1036F TOBACCO NON-USER: CPT | Performed by: INTERNAL MEDICINE

## 2021-02-02 PROCEDURE — G8484 FLU IMMUNIZE NO ADMIN: HCPCS | Performed by: INTERNAL MEDICINE

## 2021-02-02 PROCEDURE — 99214 OFFICE O/P EST MOD 30 MIN: CPT | Performed by: INTERNAL MEDICINE

## 2021-02-02 PROCEDURE — 93282 PRGRMG EVAL IMPLANTABLE DFB: CPT | Performed by: INTERNAL MEDICINE

## 2021-02-02 NOTE — PROGRESS NOTES
Cardiac Electrophysiology Outpatient Progress Note    Tony Huerta  1975  Date of Service: 2/2/2021  Referring Provider/PCP: No primary care provider on file. Chief Complaint: ICD management    HISTORY OF PRESENT ILLNESS    Tony Huerta presents to the office today for follow up of these Electrophysiology conditions: ICD management. Mr. Fifi Junior is a 39 y.o., male with a history of CKD Stage 3, HTN, obesity, chronic systolic CHF, NYHA II Stage C, non-ischemic etiology, asthmas and ICD insitu. The patient had a ICD placed in January 2017 for primary prevention. 2/2/2021: The patient presents today for follow up. He reports feeling overall well from a EP POV. His device site looks well healed and free from infection or erosion. He offers no complaints from a device POV. Currently denies any angina, syncope, dyspnea on exertion, paroxysmal nocturnal dyspnea and palpitations. The patient continues to be followed remotely.       Patient Active Problem List    Diagnosis Date Noted    Coronary artery disease involving native coronary artery of native heart without angina pectoris 06/05/2018    Pure hypercholesterolemia 06/05/2018    Single implantable cardioverter-defibrillator (ICD) in situ 01/27/2017    Essential hypertension 12/02/2016    Moderate obesity 10/25/2016    Chronic systolic heart failure ([facility] [address] 04/22/2016    Nonischemic cardiomyopathy ([facility] [address] 03/21/2016    CKD (chronic kidney disease) stage 3, GFR 30-59 ml/min 08/28/2015    Asthma 08/27/2015       Family History   Problem Relation Age of Onset    Cancer Father     No Known Problems Mother        SOCIAL HISTORY   Social History     Socioeconomic History    Marital status: Single     Spouse name: Not on file    Number of children: Not on file    Years of education: Not on file    Highest education level: Not on file   Occupational History    Not on file   Social Needs    Financial resource strain: Not on file  isosorbide mononitrate (IMDUR) 120 MG extended release tablet TAKE ONE TABLET BY MOUTH EVERY DAY 90 tablet 3    potassium chloride (KLOR-CON M) 20 MEQ extended release tablet Take 1 tablet by mouth daily 90 tablet 3    hydrALAZINE (APRESOLINE) 25 MG tablet TAKE THREE (3) TABLETS BY MOUTH THREE TIMES DAILY 270 tablet 3    CORLANOR 5 MG TABS tablet TAKE ONE TABLET BY MOUTH TWICE A DAY WITH MEALS 180 tablet 3    furosemide (LASIX) 40 MG tablet TAKE ONE TABLET BY MOUTH EVERY DAY 90 tablet 3    atorvastatin (LIPITOR) 20 MG tablet TAKE ONE TABLET BY MOUTH EVERY DAY 90 tablet 3    Handicap Placard MISC by Does not apply route Patient cannot walk 200 ft without stopping to rest.    Expiration 7/2018 1 each 0    albuterol sulfate HFA (PROVENTIL HFA) 108 (90 BASE) MCG/ACT inhaler Inhale 2 puffs into the lungs every 6 hours as needed for Wheezing 1 Inhaler 0    Blood Pressure KIT Upper arm kit; use daily as directed. Diagnosis uncontrolled hypertension, symptomatic, CKD 3 (Patient not taking: Reported on 2/2/2021) 1 kit 0     No current facility-administered medications for this visit. No Known Allergies        ROS:   Review of Systems        PHYSICAL EXAM:  Vitals:    02/02/21 1051   BP: (!) 132/90   Pulse: 55   Weight: 221 lb 3.2 oz (100.3 kg)   Height: 5' 8\" (1.727 m)     Constitutional: Oriented to person, place, and time. Well-developed and cooperative. Head: Normocephalic and atraumatic. Eyes: Conjunctivae are normal.  Neck: No  JVD present. Cardiovascular: S1 normal, S2 normal  A regular rhythm present. Pulmonary/Chest: Effort normal and breath sounds normal. No respiratory distress. Abdominal: Soft. Normal appearance   Musculoskeletal: Normal range of motion of all extremities, no muscle weakness. Neurological: Alert and oriented to person, place, and time. Skin: Skin is warm and dry. No bruising, no ecchymosis and no rash noted. Extremity: No clubbing or cyanosis. No edema.  There is doppler evidence of stage I diastolic dysfunction. Last Cath: per Dr. Kenrick Chan' note (3/16)  [facility] at [facility] apparently showed 30% distal LM but no other CAD  LVEF 15-20%    TTE: 2020  Summary   Left ventricular internal dimensions were normal in diastole and systole. Mild left ventricular concentric hypertrophy noted. No regional wall motion abnormalities seen. Normal left ventricular ejection fraction. There is doppler evidence of stage I diastolic dysfunction. Mild tricuspid regurgitation. Device Interrogation: 2/2/21   Underlying rhythm: VS  Mode: VVI 40  Battery Voltage/Longevity:  12 years    Pacing:  RV: <1%    RV R wave: 19.8 mV  Impedance: 550 ohms   Threshold: 0.7 V @ 0.5 ms  Episodes: 7 NSVT episodes since March 2020  Reprogramming included: see below  Overall device function is normal    All device programmable settings were evaluated per above and in the scanned document, along with iterative adjustments (capture thresholds) to assess and select the most appropriate final programming to provide for consistent delivery of the appropriate therapy and to verify function of the device. I have independently reviewed all of the ECGs and rhythm strips per above    I have personally reviewed the laboratory, cardiac diagnostic and radiographic testing as outlined above: We have requested previous records. 1. Single implantable cardioverter-defibrillator (ICD) in situ    2. Nonischemic cardiomyopathy ([address]    3. Essential hypertension    4. Moderate obesity         Assessment & Plan  1. Chronic systolic heart failure  - Class II, Stage C  - on coreg + Bidil equivalent (apparently no ACE/ARB/ARNI due to CKD) + aldactone  - EF 65% on TTE 9/2020  - Continue CHF management by Dr. Kenrick Chan  - Single chamber ICD placed 1/17/2017    2. CKD 3  - Creat 1.6     3. HTN  - reasonable control  - see #1  - continue current medications    4.  Obesity  - recommend weight loss - Body mass index is 33.63 kg/m². 5. ICD in-situ  - single chamber placed 1/17/17 for primary prevention  - see above interrogation  - no ICD therapies thus far    Plan  1. No changes in medications at this time. 2. Remotes q 91 days   3. Follow up in 1 year or sooner PRN. I have spent a total of 40 minutes with the patient and his/her family reviewing the above stated recommendations. A total of >50% of that time involved face-to-face time providing counseling and or coordination of care with the other providers. Thank you for allowing me to participate in your patient's care.     Triston Nazario MD  Cardiac Electrophysiology  [address] [place] [address]

## 2021-02-16 ENCOUNTER — OFFICE VISIT (OUTPATIENT)
Dept: CARDIOLOGY CLINIC | Age: 46
End: 2021-02-16
Payer: MEDICAID

## 2021-02-16 VITALS
HEART RATE: 58 BPM | HEIGHT: 68 IN | WEIGHT: 224 LBS | SYSTOLIC BLOOD PRESSURE: 134 MMHG | DIASTOLIC BLOOD PRESSURE: 66 MMHG | BODY MASS INDEX: 33.95 KG/M2

## 2021-02-16 DIAGNOSIS — E66.8 MODERATE OBESITY: ICD-10-CM

## 2021-02-16 DIAGNOSIS — I50.22 CHRONIC SYSTOLIC HEART FAILURE (HCC): ICD-10-CM

## 2021-02-16 DIAGNOSIS — I42.8 NONISCHEMIC CARDIOMYOPATHY (HCC): Primary | ICD-10-CM

## 2021-02-16 DIAGNOSIS — N18.32 STAGE 3B CHRONIC KIDNEY DISEASE (HCC): ICD-10-CM

## 2021-02-16 DIAGNOSIS — E78.00 PURE HYPERCHOLESTEROLEMIA: ICD-10-CM

## 2021-02-16 DIAGNOSIS — I10 ESSENTIAL HYPERTENSION: ICD-10-CM

## 2021-02-16 DIAGNOSIS — I25.10 CORONARY ARTERY DISEASE INVOLVING NATIVE CORONARY ARTERY OF NATIVE HEART WITHOUT ANGINA PECTORIS: ICD-10-CM

## 2021-02-16 PROCEDURE — 93000 ELECTROCARDIOGRAM COMPLETE: CPT | Performed by: INTERNAL MEDICINE

## 2021-02-16 PROCEDURE — G8484 FLU IMMUNIZE NO ADMIN: HCPCS | Performed by: INTERNAL MEDICINE

## 2021-02-16 PROCEDURE — G8427 DOCREV CUR MEDS BY ELIG CLIN: HCPCS | Performed by: INTERNAL MEDICINE

## 2021-02-16 PROCEDURE — 99215 OFFICE O/P EST HI 40 MIN: CPT | Performed by: INTERNAL MEDICINE

## 2021-02-16 PROCEDURE — G8417 CALC BMI ABV UP PARAM F/U: HCPCS | Performed by: INTERNAL MEDICINE

## 2021-02-16 PROCEDURE — 1036F TOBACCO NON-USER: CPT | Performed by: INTERNAL MEDICINE

## 2021-02-16 NOTE — PROGRESS NOTES
OUTPATIENT CARDIOLOGY FOLLOW-UP    Name: Jerel Sanders    Age: 39 y.o. Primary Care Physician: No primary care provider on file. Date of Service: 2/16/2021    Chief Complaint: Resolved nonischemic cardiomyopathy, coronary atherosclerosis, hypertension, moderate obesity    Interim History: Since his most recent evaluation, the patient has remained compensated from a cardiovascular standpoint with no interim symptoms of anginal-like chest discomfort or other ischemic equivalents nor manifestations of decompensated left ventricular systolic dysfunction or volume overload. Unfortunately, he is remained lax regarding obtaining a primary care physician or general medical care with a 5 pound weight gain and borderline systolic hypertension at the time of his present evaluation in the face of a 5 pound weight gain. He relates no changes of his functional capabilities and denies arrhythmia related symptomatology nor discharges of his implantable cardioverter defibrillator with appropriate device function documented at time of his most recent evaluation. In the interim, a follow-up echocardiogram has documented left ventricular hypertrophy with normalization of left ventricular systolic function accompanied by stage I diastolic dysfunction no significant valvular pathology. No interim assessment of his renal function or electrolytes have occurred within the past 6 months in the face of his chronic kidney disease and hypokalemia noted at the time of his most recent evaluation. Review of Systems: The remainder of a complete multisystem review including consitutional, central nervous, respiratory, circulatory, gastrointestinal, genitourinary, endocrinologic, hematologic, musculoskeletal and psychiatric are negative.     Past Medical History:  Past Medical History:   Diagnosis Date    AICD (automatic cardioverter/defibrillator) present     Asthma     CAD (coronary artery disease)  Cardiac LV ejection fraction 10-20%     CHF (congestive heart failure) (HCC)     CKD (chronic kidney disease)     COPD (chronic obstructive pulmonary disease) (HCC)     Depression     Diverticulitis     GERD (gastroesophageal reflux disease)     Hyperlipidemia     Hypertension     Nonischemic cardiomyopathy (HCC)     Panic attacks     Sleep apnea        Past Surgical History:  Past Surgical History:   Procedure Laterality Date    CARDIAC DEFIBRILLATOR PLACEMENT Left 01/27/2017    [employer] single lead ICD,     DIAGNOSTIC [facility]ARDIAC CATH LAB PROCEDURE  2015    [facility]     [place] COLONOSCOPY FLX DX W/COLLJ SPEC WHEN PFRMD N/A 9/6/2018    COL[facility]COPY performed by Kevin Gonzalez MD at [facility] ENDOSCOPY       Family History:  Family History   Problem Relation Age of Onset    Cancer Father     No Known Problems Mother        Social History:  Social History     Socioeconomic History    Marital status: Single     Spouse name: Not on file    Number of children: Not on file    Years of education: Not on file    Highest education level: Not on file   Occupational History    Not on file   Social Needs    Financial resource strain: Not on file    Food insecurity     Worry: Not on file     Inability: Not on file    Transportation needs     Medical: Not on file     Non-medical: Not on file   Tobacco Use    Smoking status: Never Smoker    Smokeless tobacco: Never Used    Tobacco comment: Patient counseled to remain smoke free   Substance and Sexual Activity    Alcohol use: Yes     Comment: 2-3 times monthly.   coffee rarely     Drug use: Yes     Types: Marijuana     Comment: Occasionallu    Sexual activity: Yes     Partners: Female     Comment:  for 7 years   Lifestyle    Physical activity     Days per week: Not on file     Minutes per session: Not on file    Stress: Not on file   Relationships    Social connections     Talks on phone: Not on file     Gets together: Not on file Attends [employer] service: Not on file     Active member of club or organization: Not on file     Attends meetings of clubs or organizations: Not on file     Relationship status: Not on file    Intimate partner violence     Fear of current or ex partner: Not on file     Emotionally abused: Not on file     Physically abused: Not on file     Forced sexual activity: Not on file   Other Topics Concern    Not on file   Social History Narrative    Not on file       Allergies:  No Known Allergies    Current Medications:  Current Outpatient Medications   Medication Sig Dispense Refill    ASPIRIN LOW DOSE 81 MG chewable tablet CHEW AND SWALLOW ONE TABLET BY MOUTH EVERY DAY 90 tablet 3    carvedilol (COREG) 25 MG tablet TAKE 2 TABLETS BY MOUTH 2 TIMES A DAY WITH MEALS 180 tablet 3    isosorbide mononitrate (IMDUR) 120 MG extended release tablet TAKE ONE TABLET BY MOUTH EVERY DAY 90 tablet 3    potassium chloride (KLOR-CON M) 20 MEQ extended release tablet Take 1 tablet by mouth daily 90 tablet 3    hydrALAZINE (APRESOLINE) 25 MG tablet TAKE THREE (3) TABLETS BY MOUTH THREE TIMES DAILY 270 tablet 3    CORLANOR 5 MG TABS tablet TAKE ONE TABLET BY MOUTH TWICE A DAY WITH MEALS 180 tablet 3    furosemide (LASIX) 40 MG tablet TAKE ONE TABLET BY MOUTH EVERY DAY 90 tablet 3    atorvastatin (LIPITOR) 20 MG tablet TAKE ONE TABLET BY MOUTH EVERY DAY 90 tablet 3    Handicap Placard MISC by Does not apply route Patient cannot walk 200 ft without stopping to rest.    Expiration 7/2018 1 each 0    albuterol sulfate HFA (PROVENTIL HFA) 108 (90 BASE) MCG/ACT inhaler Inhale 2 puffs into the lungs every 6 hours as needed for Wheezing 1 Inhaler 0    Blood Pressure KIT Upper arm kit; use daily as directed. Diagnosis uncontrolled hypertension, symptomatic, CKD 3 1 kit 0     No current facility-administered medications for this visit.           Physical Exam: /66 (Site: Right Upper Arm, Position: Sitting, Cuff Size: Medium Adult)   Pulse 58   Ht 5' 8\" (1.727 m)   Wt 224 lb (101.6 kg)   BMI 34.06 kg/m²   Wt Readings from Last 3 Encounters:   02/16/21 224 lb (101.6 kg)   02/02/21 221 lb 3.2 oz (100.3 kg)   07/29/20 218 lb (98.9 kg)     The patient is awake, alert and in no discomfort or distress. No gross musculoskeletal deformity is present. No significant skin or nail changes are present. Gross examination of head, eyes, nose and throat are negative. Jugular venous pressure is normal and no carotid bruits are present. Normal respiratory effort is noted with no accessory muscle usage present. Lung fields are clear to ascultation. Cardiac examination is notable for a regular rate and rhythm with no palpable thrill. Soft fourth heart sound and no cardiac murmur are identified. A benign abdominal examination is present with the exception of obesity and no masses or organomegaly. Intact pulses are present throughout all extremities and no peripheral edema is present. No focal neurologic deficits are present. Laboratory Tests:  Lab Results   Component Value Date    CREATININE 1.6 (H) 08/16/2018    BUN 10 08/16/2018     08/16/2018    K 3.4 (L) 08/16/2018     08/16/2018    CO2 26 08/16/2018     No results found for: BNP  Lab Results   Component Value Date    WBC 7.3 08/16/2018    RBC 5.27 08/16/2018    HGB 14.9 08/16/2018    HCT 46.1 08/16/2018    MCV 87.5 08/16/2018    MCH 28.3 08/16/2018    MCHC 32.3 08/16/2018    RDW 14.4 08/16/2018     08/16/2018    MPV 10.9 08/16/2018     No results for input(s): ALKPHOS, ALT, AST, PROT, BILITOT, BILIDIR, LABALBU in the last 72 hours.   Lab Results   Component Value Date    MG 2.1 01/27/2017     Lab Results   Component Value Date    PROTIME 12.2 01/28/2017    INR 1.1 01/28/2017     Lab Results   Component Value Date    TSH 1.070 08/27/2015     No components found for: CHLPL  Lab Results Component Value Date    TRIG 132 10/03/2017    TRIG 139 05/10/2016    TRIG 207 (H) 01/16/2015     Lab Results   Component Value Date    HDL 39 10/03/2017    HDL 32 05/10/2016    HDL 38 01/16/2015     Lab Results   Component Value Date    LDLCALC 134 (H) 10/03/2017    LDLCALC 151 (H) 05/10/2016    LDLCALC 149 (H) 01/16/2015       Cardiac Tests:  ECG: A resting electrocardiogram demonstrates evidence of sinus bradycardia with borderline voltage criteria for left ventricular hypertrophy and nonspecific ST changes  Last Echocardiogram: An echocardiogram of September, 2020 demonstrated evidence of a normal-sized left ventricular chamber with mild concentric left ventricular hypertrophy and normal left ventricular systolic function with stage I diastolic dysfunction and no significant valvular pathology ASSESSMENT / PLAN: On a clinical basis, the patient presently remains compensated from a cardiovascular standpoint in the face of his nonischemic cardiomyopathy presently with normalization of left ventricular systolic function on optimal medical therapy in the face of previously documented angiographically insignificant coronary atherosclerosis and chronic kidney disease. I have extensively discussed this with him as well as the importance of the continuation of optimal medical therapy to assist in continued stabilization of his previously identified left ventricular systolic dysfunction as well as the needs of primary care assessment of noncardiovascular issues and follow-up of his blood pressure and renal function independent of his cardiovascular assessments. I additionally have discussed his needs of appropriate lifestyle modification to achieve weight reduction to benefit diastolic cardiac performance and to reduce his risk of the development of obstructive sleep apnea with associated adverse cardiovascular effects. Aggressive risk factor modification including that of his blood pressure and serum lipids will remain essential to reducing risk of future atherosclerotic development. Ongoing monitoring of his implantable cardioverter defibrillator will be deferred to the electrophysiology service largely on a remote basis. Based on the duration since his most recent ischemic assessment in view of the involvement of his left main trunk at the time of his angiography, I have recommended on a prognostic basis the performance of a gated vasodilator myocardial perfusion imaging study and will provide additional recommendations as appropriate upon completion review. Otherwise plan to initiate annual cardiovascular reassessment and would happily reevaluate him in the interim should additional cardiovascular difficulties or concerns arise.

## 2021-02-17 ENCOUNTER — TELEPHONE (OUTPATIENT)
Dept: CARDIOLOGY CLINIC | Age: 46
End: 2021-02-17

## 2021-02-17 DIAGNOSIS — I42.8 NONISCHEMIC CARDIOMYOPATHY (HCC): ICD-10-CM

## 2021-02-17 DIAGNOSIS — I42.8 NONISCHEMIC CARDIOMYOPATHY (HCC): Primary | ICD-10-CM

## 2021-02-17 RX ORDER — SPIRONOLACTONE 25 MG/1
12.5 TABLET ORAL DAILY
Qty: 30 TABLET | Refills: 8 | Status: ON HOLD
Start: 2021-02-17 | End: 2021-10-10 | Stop reason: HOSPADM

## 2021-02-17 RX ORDER — SPIRONOLACTONE 25 MG/1
12.5 TABLET ORAL DAILY
COMMUNITY
End: 2021-02-17 | Stop reason: SDUPTHER

## 2021-02-17 NOTE — TELEPHONE ENCOUNTER
----- Message from Jennifer Gracia MD sent at 2/17/2021  6:59 AM EST -----  Laboratory studies reviewed, based on findings kidney function remains diminished but stable with persistent diminished potassium levels. Discontinue potassium supplement and on the basis of blood pressure initiate spironolactone 12.5 mg daily. Please schedule follow-up blood pressure check and basic metabolic profile in 1 week      Called patient re:  Above.   Axel

## 2021-02-25 ENCOUNTER — TELEPHONE (OUTPATIENT)
Dept: CARDIOLOGY | Age: 46
End: 2021-02-25

## 2021-02-25 NOTE — TELEPHONE ENCOUNTER
Spoke with patient reminder of appointment 3/2/21 at 10:30am for a Pharmacological stress test instructions and COVID checklist reviewed patient confirmed appointment.

## 2021-03-02 ENCOUNTER — TELEPHONE (OUTPATIENT)
Dept: CARDIOLOGY | Age: 46
End: 2021-03-02

## 2021-03-02 RX ORDER — ATORVASTATIN CALCIUM 20 MG/1
TABLET, FILM COATED ORAL
Qty: 90 TABLET | Refills: 3 | Status: SHIPPED
Start: 2021-03-02 | End: 2021-04-06

## 2021-03-02 NOTE — TELEPHONE ENCOUNTER
Patient was a no show no call for testing today. Called patient and he stated he had an emergency and is out of town. Rescheduled for 3/25.

## 2021-03-17 ENCOUNTER — NURSE ONLY (OUTPATIENT)
Dept: NON INVASIVE DIAGNOSTICS | Age: 46
End: 2021-03-17
Payer: MEDICAID

## 2021-03-17 DIAGNOSIS — Z95.810 SINGLE IMPLANTABLE CARDIOVERTER-DEFIBRILLATOR (ICD) IN SITU: Primary | ICD-10-CM

## 2021-03-17 DIAGNOSIS — I42.8 NONISCHEMIC CARDIOMYOPATHY (HCC): ICD-10-CM

## 2021-03-23 ENCOUNTER — TELEPHONE (OUTPATIENT)
Dept: CARDIOLOGY | Age: 46
End: 2021-03-23

## 2021-03-23 NOTE — TELEPHONE ENCOUNTER
Spoke with patient reminder of appointment on 3/25/21 at 8:30 for a Pharmacological stress test instructions reviewed patient confirmed appointment.

## 2021-03-25 ENCOUNTER — TELEPHONE (OUTPATIENT)
Dept: CARDIOLOGY | Age: 46
End: 2021-03-25

## 2021-03-29 ENCOUNTER — HOSPITAL ENCOUNTER (EMERGENCY)
Age: 46
Discharge: HOME | End: 2021-03-29
Attending: EMERGENCY MEDICINE
Payer: MEDICAID

## 2021-03-29 VITALS
OXYGEN SATURATION: 96 % | HEART RATE: 60 BPM | SYSTOLIC BLOOD PRESSURE: 128 MMHG | DIASTOLIC BLOOD PRESSURE: 83 MMHG | TEMPERATURE: 96.8 F | RESPIRATION RATE: 12 BRPM

## 2021-03-29 DIAGNOSIS — R20.2 NUMBNESS AND TINGLING IN LEFT HAND: Primary | ICD-10-CM

## 2021-03-29 DIAGNOSIS — R20.0 NUMBNESS AND TINGLING IN LEFT HAND: Primary | ICD-10-CM

## 2021-03-29 PROCEDURE — G0382 LEV 3 HOSP TYPE B ED VISIT: HCPCS

## 2021-03-29 PROCEDURE — 6360000002 HC RX W HCPCS: Performed by: EMERGENCY MEDICINE

## 2021-03-29 PROCEDURE — 96372 THER/PROPH/DIAG INJ SC/IM: CPT

## 2021-03-29 RX ORDER — KETOROLAC TROMETHAMINE 30 MG/ML
30 INJECTION, SOLUTION INTRAMUSCULAR; INTRAVENOUS ONCE
Status: COMPLETED | OUTPATIENT
Start: 2021-03-29 | End: 2021-03-29

## 2021-03-29 RX ORDER — DEXAMETHASONE SODIUM PHOSPHATE 10 MG/ML
10 INJECTION, SOLUTION INTRAMUSCULAR; INTRAVENOUS ONCE
Status: COMPLETED | OUTPATIENT
Start: 2021-03-29 | End: 2021-03-29

## 2021-03-29 RX ADMIN — DEXAMETHASONE SODIUM PHOSPHATE 10 MG: 10 INJECTION, SOLUTION INTRAMUSCULAR; INTRAVENOUS at 12:23

## 2021-03-29 RX ADMIN — KETOROLAC TROMETHAMINE 30 MG: 30 INJECTION, SOLUTION INTRAMUSCULAR at 12:24

## 2021-03-29 ASSESSMENT — PAIN SCALES - GENERAL: PAINLEVEL_OUTOF10: 0

## 2021-03-29 NOTE — ED PROVIDER NOTES
HPI:  3/29/21,   Time: 12:19 PM EDT         Carlene Alexander is a 39 y.o. male presenting to the ED for tingling and numbness to the left second and third fingertips, beginning 1 week ago. The complaint has been persistent, mild in severity, and worsened by nothing. No injury  Denies any chest pain shortness of breath palpitations nausea vomiting or diarrhea. Denies any weakness    ROS:   Pertinent positives and negatives are stated within HPI, all other systems reviewed and are negative.  --------------------------------------------- PAST HISTORY ---------------------------------------------  Past Medical History:  has a past medical history of AICD (automatic cardioverter/defibrillator) present, Asthma, CAD (coronary artery disease), Cardiac LV ejection fraction 10-20%, CHF (congestive heart failure) ([facility] [address] CKD (chronic kidney disease), COPD (chronic obstructive pulmonary disease) ([facility] [address] Depression, Diverticulitis, GERD (gastroesophageal reflux disease), Hyperlipidemia, Hypertension, Nonischemic cardiomyopathy ([facility] [address] Panic attacks, and Sleep apnea. Past Surgical History:  has a past surgical history that includes Diagnostic Cardiac Cath Lab Procedure (2015); Cardiac defibrillator placement (Left, 01/27/2017); and pr colonoscopy flx dx w/collj spec when pfrmd (N/A, 9/6/2018). Social History:  reports that he has never smoked. He has never used smokeless tobacco. He reports current alcohol use. He reports current drug use. Drug: Marijuana. Family History: family history includes Cancer in his father; No Known Problems in his mother. The patients home medications have been reviewed. Allergies: Patient has no known allergies. -------------------------------------------------- RESULTS -------------------------------------------------  All laboratory and radiology results have been personally reviewed by myself   LABS:  No results found for this visit on 03/29/21.     RADIOLOGY:  Interpreted by Radiologist.  No orders to display       ------------------------- NURSING NOTES AND VITALS REVIEWED ---------------------------   The nursing notes within the ED encounter and vital signs as below have been reviewed. /83   Pulse 60   Temp 96.8 °F (36 °C) (Infrared)   Resp 12   SpO2 96%   Oxygen Saturation Interpretation: Normal      ---------------------------------------------------PHYSICAL EXAM--------------------------------------      Constitutional/General: Alert and oriented x3, well appearing, non toxic in NAD  Head: NC/AT  Eyes: PERRL, EOMI  Mouth: Oropharynx clear, handling secretions, no trismus  Neck: Supple, full ROM, no meningeal signs  Pulmonary: Lungs clear to auscultation bilaterally, no wheezes, rales, or rhonchi. Not in respiratory distress  Cardiovascular:  Regular rate and rhythm, no murmurs, gallops, or rubs. 2+ distal pulses  Abdomen: Soft, non tender, non distended,   Extremities: Moves all extremities x 4. Warm and well perfused  Skin: warm and dry without rash  Neurologic: GCS 15,  Psych: Normal Affect      ------------------------------ ED COURSE/MEDICAL DECISION MAKING----------------------  Medications   ketorolac (TORADOL) injection 30 mg (30 mg Intramuscular Given 3/29/21 1224)   dexamethasone (PF) (DECADRON) injection 10 mg (10 mg Intramuscular Given 3/29/21 1223)         Medical Decision Making:    Patient advised to establish and follow-up with primary care physician for possible referral.    Counseling: The emergency provider has spoken with the patient and discussed todays results, in addition to providing specific details for the plan of care and counseling regarding the diagnosis and prognosis. Questions are answered at this time and they are agreeable with the plan.      --------------------------------- IMPRESSION AND DISPOSITION ---------------------------------    IMPRESSION  1.  Numbness and tingling in left hand        DISPOSITION  Disposition: Discharge to home  Patient condition is good                  Colten Torres MD  03/29/21 2146

## 2021-03-31 PROCEDURE — 93295 DEV INTERROG REMOTE 1/2/MLT: CPT | Performed by: INTERNAL MEDICINE

## 2021-03-31 PROCEDURE — 93296 REM INTERROG EVL PM/IDS: CPT | Performed by: INTERNAL MEDICINE

## 2021-03-31 NOTE — PROGRESS NOTES
See [facility] [place] report. Remote monitoring reviewed over a 90 day period.   End of 90 day monitoring period date of service 03/17/2021

## 2021-04-05 RX ORDER — HYDRALAZINE HYDROCHLORIDE 25 MG/1
TABLET, FILM COATED ORAL
Qty: 270 TABLET | Refills: 3 | Status: SHIPPED
Start: 2021-04-05 | End: 2021-08-09

## 2021-04-06 ENCOUNTER — OFFICE VISIT (OUTPATIENT)
Dept: FAMILY MEDICINE CLINIC | Age: 46
End: 2021-04-06
Payer: MEDICAID

## 2021-04-06 VITALS
BODY MASS INDEX: 31.92 KG/M2 | OXYGEN SATURATION: 96 % | TEMPERATURE: 96.7 F | HEART RATE: 72 BPM | RESPIRATION RATE: 16 BRPM | WEIGHT: 210.6 LBS | HEIGHT: 68 IN | DIASTOLIC BLOOD PRESSURE: 78 MMHG | SYSTOLIC BLOOD PRESSURE: 122 MMHG

## 2021-04-06 DIAGNOSIS — I42.8 NONISCHEMIC CARDIOMYOPATHY (HCC): Primary | ICD-10-CM

## 2021-04-06 DIAGNOSIS — I10 ESSENTIAL HYPERTENSION: ICD-10-CM

## 2021-04-06 DIAGNOSIS — N18.32 STAGE 3B CHRONIC KIDNEY DISEASE (HCC): ICD-10-CM

## 2021-04-06 DIAGNOSIS — G56.02 CARPAL TUNNEL SYNDROME OF LEFT WRIST: ICD-10-CM

## 2021-04-06 DIAGNOSIS — J45.20 MILD INTERMITTENT ASTHMA WITHOUT COMPLICATION: ICD-10-CM

## 2021-04-06 DIAGNOSIS — K21.9 GASTROESOPHAGEAL REFLUX DISEASE, UNSPECIFIED WHETHER ESOPHAGITIS PRESENT: ICD-10-CM

## 2021-04-06 PROCEDURE — 99204 OFFICE O/P NEW MOD 45 MIN: CPT | Performed by: FAMILY MEDICINE

## 2021-04-06 RX ORDER — FAMOTIDINE 40 MG/1
40 TABLET, FILM COATED ORAL EVERY EVENING
Qty: 90 TABLET | Refills: 1 | Status: SHIPPED
Start: 2021-04-06 | End: 2021-10-17 | Stop reason: SDUPTHER

## 2021-04-06 SDOH — ECONOMIC STABILITY: TRANSPORTATION INSECURITY
IN THE PAST 12 MONTHS, HAS LACK OF TRANSPORTATION KEPT YOU FROM MEETINGS, WORK, OR FROM GETTING THINGS NEEDED FOR DAILY LIVING?: NO

## 2021-04-06 SDOH — ECONOMIC STABILITY: TRANSPORTATION INSECURITY
IN THE PAST 12 MONTHS, HAS THE LACK OF TRANSPORTATION KEPT YOU FROM MEDICAL APPOINTMENTS OR FROM GETTING MEDICATIONS?: NO

## 2021-04-06 ASSESSMENT — ENCOUNTER SYMPTOMS
EYE REDNESS: 0
SINUS PAIN: 0
EYE DISCHARGE: 0
COUGH: 0
RHINORRHEA: 0
SHORTNESS OF BREATH: 0
PHOTOPHOBIA: 0

## 2021-04-06 ASSESSMENT — PATIENT HEALTH QUESTIONNAIRE - PHQ9
SUM OF ALL RESPONSES TO PHQ QUESTIONS 1-9: 0
SUM OF ALL RESPONSES TO PHQ QUESTIONS 1-9: 0
2. FEELING DOWN, DEPRESSED OR HOPELESS: 0

## 2021-04-06 NOTE — PROGRESS NOTES
2021    Nancy Moore    Chief Complaint   Patient presents with    New Patient     preview PC was graciela in [facility]     Lipid yearly screen due, A1c yearly due , Dtap due, covid due (pt not getting)       HPI  History was obtained from patient. Pineda Giraldo is a 39 y.o. male who presents today with the followin. Nonischemic cardiomyopathy ([address]    2. Essential hypertension    3. Mild intermittent asthma without complication    4. Stage 3b chronic kidney disease    5. Carpal tunnel syndrome of left wrist    6. Gastroesophageal reflux disease, unspecified whether esophagitis present    Patient is here to establish primary care. Recently his only been seeing Dr. Erica Flores his cardiologist.  Patient was diagnosed with nonischemic cardiomyopathy about 5 years ago. At that time he had an ejection fraction of approximately 15%. Patient has been under the care of Dr. Erica Flores and now his ejection fraction is up to 65%. Patient has not seen a primary physician for a couple years. Patient also has a history of chronic kidney failure2.0 and a BUN of 24. His most recent BMP showed a creatinine of 2.0 and a BUN of 24. This was from 4 years ago. This is the most recent in his epic chart. He has been getting labs done over at [facility] and those records have been requested. Patient also complains of 2 weeks of numbness and tingling in the fingers of his left hand. He has had no acute injury. He has never had this before. Dates that the second through fourth fingers have the symptoms but very little in the thumb and not in the little finger. Patient has not been doing any new activities. The discomfort does not cause him to lose any sleep. He complains further of some reflux symptoms with some burning sensation substernally and intermittent stomach upset. He has had no vomiting and no sharp abdominal pain.      REVIEW OF SYMPTOMS    Review of Systems   Constitutional: Negative for chills, activity     Days per week: None     Minutes per session: None    Stress: None   Relationships    Social connections     Talks on phone: None     Gets together: None     Attends Shinto service: None     Active member of club or organization: None     Attends meetings of clubs or organizations: None     Relationship status: None    Intimate partner violence     Fear of current or ex partner: None     Emotionally abused: None     Physically abused: None     Forced sexual activity: None   Other Topics Concern    None   Social History Narrative    None        SURGICAL HISTORY  Past Surgical History:   Procedure Laterality Date    CARDIAC DEFIBRILLATOR PLACEMENT Left 01/27/2017    [facility] single lead ICD,     DIAGNOSTIC CARDIAC CATH [facility] PROCEDURE  [facility]     MA COLONOSCOPY FLX DX W/COLLSOURAV Card[place] 1978 PFRMD N/A 9/6/2018    COLONOSCOPY performed by Chirag Tillman MD at [place] [address]  Current Outpatient Medications   Medication Sig Dispense Refill    famotidine (PEPCID) 40 MG tablet Take 1 tablet by mouth every evening 90 tablet 1    hydrALAZINE (APRESOLINE) 25 MG tablet TAKE THREE (3) TABLETS BY MOUTH THREE TIMES DAILY 270 tablet 3    spironolactone (ALDACTONE) 25 MG tablet Take 0.5 tablets by mouth daily 30 tablet 8    ASPIRIN LOW DOSE 81 MG chewable tablet CHEW AND SWALLOW ONE TABLET BY MOUTH EVERY DAY 90 tablet 3    carvedilol (COREG) 25 MG tablet TAKE 2 TABLETS BY MOUTH 2 TIMES A DAY WITH MEALS 180 tablet 3    isosorbide mononitrate (IMDUR) 120 MG extended release tablet TAKE ONE TABLET BY MOUTH EVERY DAY 90 tablet 3    CORLANOR 5 MG TABS tablet TAKE ONE TABLET BY MOUTH TWICE A DAY WITH MEALS 180 tablet 3    furosemide (LASIX) 40 MG tablet TAKE ONE TABLET BY MOUTH EVERY DAY 90 tablet 3    Handicap Placard MISC by Does not apply route Patient cannot walk 200 ft without stopping to rest.    Expiration 7/2018 1 each 0    albuterol sulfate HFA (PROVENTIL HFA) 108 (90 BASE) MCG/ACT inhaler Inhale 2 puffs into the lungs every 6 hours as needed for Wheezing 1 Inhaler 0     No current facility-administered medications for this visit. ALLERGIES  No Known Allergies    PHYSICAL EXAM    /78   Pulse 72   Temp 96.7 °F (35.9 °C) (Temporal)   Resp 16   Ht 5' 8\" (1.727 m)   Wt 210 lb 9.6 oz (95.5 kg)   SpO2 96%   BMI 32.02 kg/m²     Physical Exam  Vitals signs and nursing note reviewed. Constitutional:       Appearance: Normal appearance. HENT:      Head: Normocephalic and atraumatic. Nose: Nose normal.      Mouth/Throat:      Mouth: Mucous membranes are moist.      Pharynx: Oropharynx is clear. Eyes:      General: No scleral icterus. Extraocular Movements: Extraocular movements intact. Conjunctiva/sclera: Conjunctivae normal.   Neck:      Musculoskeletal: Neck supple. Cardiovascular:      Rate and Rhythm: Normal rate and regular rhythm. Pulses: Normal pulses. Heart sounds: Normal heart sounds. Pulmonary:      Effort: Pulmonary effort is normal.      Breath sounds: Normal breath sounds. Abdominal:      Palpations: Abdomen is soft. There is no mass. Tenderness: There is no abdominal tenderness. There is no right CVA tenderness or left CVA tenderness. Musculoskeletal:         General: No swelling. Right lower leg: No edema. Left lower leg: No edema. Comments: Patient reports mild decrease sensation and a second and third fingers and the medial aspect of the fourth finger on the left. He has normal  strength. Lymphadenopathy:      Cervical: No cervical adenopathy. Skin:     General: Skin is warm and dry. Neurological:      General: No focal deficit present. Mental Status: He is alert and oriented to person, place, and time. Motor: No weakness.       Gait: Gait normal.   Psychiatric:         Mood and Affect: Mood normal.         ASSESSMENT & PLAN    Maricruz Longo was seen today for new patient and health maintenance. Diagnoses and all orders for this visit:    Nonischemic cardiomyopathy ([facility]    Essential hypertension    Mild intermittent asthma without complication    Stage 3b chronic kidney disease    Carpal tunnel syndrome of left wrist    Gastroesophageal reflux disease, unspecified whether esophagitis present    Other orders  -     famotidine (PEPCID) 40 MG tablet; Take 1 tablet by mouth every evening    Records will be requested from [facility] concerning his labs. We will review these and recheck his carpal tunnel. We will consider referral to nephrology if indicated. Return in about 2 weeks (around 4/20/2021) for Recheck carpal tunnel and review labs. .         Electronically signed by Robin Dumont.  Beto Schaffer DO on 4/6/2021

## 2021-04-19 RX ORDER — FUROSEMIDE 40 MG/1
TABLET ORAL
Qty: 90 TABLET | Refills: 3 | Status: SHIPPED
Start: 2021-04-19 | End: 2022-01-04 | Stop reason: SDUPTHER

## 2021-04-21 ENCOUNTER — TELEPHONE (OUTPATIENT)
Dept: CARDIOLOGY | Age: 46
End: 2021-04-21

## 2021-05-04 ENCOUNTER — OFFICE VISIT (OUTPATIENT)
Dept: FAMILY MEDICINE CLINIC | Age: 46
End: 2021-05-04
Payer: MEDICAID

## 2021-05-04 VITALS
TEMPERATURE: 98.6 F | DIASTOLIC BLOOD PRESSURE: 71 MMHG | OXYGEN SATURATION: 96 % | SYSTOLIC BLOOD PRESSURE: 99 MMHG | WEIGHT: 215 LBS | HEART RATE: 78 BPM | BODY MASS INDEX: 32.69 KG/M2

## 2021-05-04 DIAGNOSIS — N18.32 STAGE 3B CHRONIC KIDNEY DISEASE (HCC): ICD-10-CM

## 2021-05-04 DIAGNOSIS — G56.02 CARPAL TUNNEL SYNDROME OF LEFT WRIST: ICD-10-CM

## 2021-05-04 DIAGNOSIS — I10 ESSENTIAL HYPERTENSION: Primary | ICD-10-CM

## 2021-05-04 PROCEDURE — 99214 OFFICE O/P EST MOD 30 MIN: CPT | Performed by: FAMILY MEDICINE

## 2021-05-04 ASSESSMENT — ENCOUNTER SYMPTOMS
COUGH: 0
SHORTNESS OF BREATH: 0

## 2021-05-04 NOTE — PROGRESS NOTES
Aguila Seymour (:  1975) is a 39 y.o. male,Established patient, here for evaluation of the following chief complaint(s):  Numbness (C/o tingling sensation in fingers of Lt hand. Has raised area in Lt forearm,denies pain.)      ASSESSMENT/PLAN:  1. Essential hypertension  -     CBC WITH AUTO DIFFERENTIAL; Future  -     TSH; Future  2. Stage 3b chronic kidney disease  -     COMPREHENSIVE METABOLIC PANEL; Future  -     LIPID PANEL; Future  -     URINALYSIS; Future  -     MICROALBUMIN, UR; Future  3. Carpal tunnel syndrome of left wrist  -     [facility] - Johnnie Field., Evelyn Frankel, Orthopaedics and Sports Medicine, [place]  Will be called with the results of labs that have been ordered today. Return in about 3 months (around 2021) for recheck hypertension. SUBJECTIVE/OBJECTIVE:  Patient is here for recheck of his hypertension. Patient was scheduled to have a stress test recently that he no showed and has since rescheduled with Dr. Dorine Duane. Patient's only complaint today is continued left wrist and hand pain as before. He states this has been going on for a few months. Pain is not improved since his last visit. Patient's labs from [facility] have been reviewed which showed a creatinine greater than 2 last year. He has not had any blood work done yet this year. Patient states he is taking all his medications as prescribed. Review of Systems   Constitutional: Negative for chills, fatigue and fever. Respiratory: Negative for cough and shortness of breath. Cardiovascular: Negative for chest pain, palpitations and leg swelling. Genitourinary: Negative for difficulty urinating, dysuria, hematuria and urgency. Neurological: Negative for headaches. Psychiatric/Behavioral: Negative for sleep disturbance. Physical Exam  Constitutional:       Appearance: Normal appearance. He is obese. HENT:      Head: Normocephalic and atraumatic. Eyes:      General: No scleral icterus. Conjunctiva/sclera: Conjunctivae normal.   Cardiovascular:      Heart sounds: Normal heart sounds. Pulmonary:      Effort: Pulmonary effort is normal.      Breath sounds: Normal breath sounds. Neurological:      Mental Status: He is oriented to person, place, and time. Comments: Patient has mild decrease sensation in the first through third digits of the left hand. He has full range of motion of all the digits with normal muscle strength. An electronic signature was used to authenticate this note. --Lorenzo Iniguez.  Roberto Bartlett

## 2021-05-07 ENCOUNTER — TELEPHONE (OUTPATIENT)
Dept: CARDIOLOGY | Age: 46
End: 2021-05-07

## 2021-05-07 NOTE — TELEPHONE ENCOUNTER
Spoke with patient reminder of appointment on 5/11/21 at 10:30 for a Pharmacological stress test instructions and COVID checklist reviewed patient confirmed appointment

## 2021-05-11 ENCOUNTER — HOSPITAL ENCOUNTER (OUTPATIENT)
Dept: CARDIOLOGY | Age: 46
Discharge: HOME | End: 2021-05-11
Payer: MEDICAID

## 2021-05-11 VITALS
WEIGHT: 215 LBS | BODY MASS INDEX: 32.58 KG/M2 | HEIGHT: 68 IN | SYSTOLIC BLOOD PRESSURE: 116 MMHG | DIASTOLIC BLOOD PRESSURE: 75 MMHG | HEART RATE: 58 BPM

## 2021-05-11 PROCEDURE — 78452 HT MUSCLE IMAGE SPECT MULT: CPT

## 2021-05-11 PROCEDURE — 6360000002 HC RX W HCPCS: Performed by: INTERNAL MEDICINE

## 2021-05-11 PROCEDURE — 2580000003 HC RX 258: Performed by: INTERNAL MEDICINE

## 2021-05-11 PROCEDURE — 93017 CV STRESS TEST TRACING ONLY: CPT

## 2021-05-11 PROCEDURE — A9502 TC99M TETROFOSMIN: HCPCS | Performed by: INTERNAL MEDICINE

## 2021-05-11 PROCEDURE — 3430000000 HC RX DIAGNOSTIC RADIOPHARMACEUTICAL: Performed by: INTERNAL MEDICINE

## 2021-05-11 PROCEDURE — 99999 PR OFFICE/OUTPT VISIT,PROCEDURE ONLY: CPT | Performed by: INTERNAL MEDICINE

## 2021-05-11 RX ORDER — SODIUM CHLORIDE 0.9 % (FLUSH) 0.9 %
10 SYRINGE (ML) INJECTION PRN
Status: DISCONTINUED | OUTPATIENT
Start: 2021-05-11 | End: 2021-05-12 | Stop reason: HOSPADM

## 2021-05-11 RX ADMIN — REGADENOSON 0.4 MG: 0.08 INJECTION, SOLUTION INTRAVENOUS at 12:08

## 2021-05-11 RX ADMIN — SODIUM CHLORIDE, PRESERVATIVE FREE 10 ML: 5 INJECTION INTRAVENOUS at 12:09

## 2021-05-11 RX ADMIN — TETROFOSMIN 10.5 MILLICURIE: 0.23 INJECTION, POWDER, LYOPHILIZED, FOR SOLUTION INTRAVENOUS at 10:46

## 2021-05-11 RX ADMIN — TETROFOSMIN 34.7 MILLICURIE: 0.23 INJECTION, POWDER, LYOPHILIZED, FOR SOLUTION INTRAVENOUS at 12:08

## 2021-05-11 RX ADMIN — SODIUM CHLORIDE, PRESERVATIVE FREE 10 ML: 5 INJECTION INTRAVENOUS at 10:46

## 2021-05-11 RX ADMIN — SODIUM CHLORIDE, PRESERVATIVE FREE 10 ML: 5 INJECTION INTRAVENOUS at 12:08

## 2021-05-11 NOTE — PROCEDURES
analog analysis demonstrated abnormal patient motion. A moderate defect was present in the inferior and inferoseptal wall(s) that was  moderate size on the post regadeson images          The resting images are show no change. Gated SPECT left ventricular ejection fraction was calculated to be 60%, with normal myocardial thickening and wall motion. Impression:    1. Electrocardiographically normal regadenoson infusion with a clinically non-ischemic response when compared to the baseline ECG  2. Myocardial perfusion imaging was normal with attenuation artifact. 3. Overall left ventricular systolic function was normal without regional wall motion abnormalities. 4. Low risk general pharmacologic stress test.    Thank you for sending your patient to this [employer].      Electronically signed by Fauzia Monroe MD on 5/11/2021 at 2:21 PM

## 2021-05-12 ENCOUNTER — TELEPHONE (OUTPATIENT)
Dept: CARDIOLOGY CLINIC | Age: 46
End: 2021-05-12

## 2021-05-12 NOTE — TELEPHONE ENCOUNTER
----- Message from Jose Antonio Mora MD sent at 5/11/2021  3:58 PM EDT -----  Please notify patient that stress test is normal, continue as directed and follow-up as scheduled    Called pt re above

## 2021-05-13 DIAGNOSIS — M25.532 LEFT WRIST PAIN: Primary | ICD-10-CM

## 2021-06-03 RX ORDER — ASPIRIN 81 MG
TABLET,CHEWABLE ORAL
Qty: 90 TABLET | Refills: 3 | Status: SHIPPED
Start: 2021-06-03 | End: 2022-01-04 | Stop reason: SDUPTHER

## 2021-07-30 RX ORDER — CARVEDILOL 25 MG/1
TABLET ORAL
Qty: 180 TABLET | Refills: 3 | Status: ON HOLD
Start: 2021-07-30 | End: 2021-08-25 | Stop reason: SDUPTHER

## 2021-08-04 ENCOUNTER — HOSPITAL ENCOUNTER (EMERGENCY)
Age: 46
Discharge: HOME | End: 2021-08-04
Payer: MEDICAID

## 2021-08-04 VITALS
HEART RATE: 72 BPM | WEIGHT: 214 LBS | DIASTOLIC BLOOD PRESSURE: 84 MMHG | TEMPERATURE: 97 F | RESPIRATION RATE: 20 BRPM | HEIGHT: 68 IN | OXYGEN SATURATION: 96 % | SYSTOLIC BLOOD PRESSURE: 124 MMHG | BODY MASS INDEX: 32.43 KG/M2

## 2021-08-04 DIAGNOSIS — T14.8XXA MUSCLE STRAIN: ICD-10-CM

## 2021-08-04 DIAGNOSIS — M62.838 SPASM OF MUSCLE: Primary | ICD-10-CM

## 2021-08-04 PROCEDURE — 99283 EMERGENCY DEPT VISIT LOW MDM: CPT

## 2021-08-04 ASSESSMENT — ENCOUNTER SYMPTOMS
COUGH: 0
COLOR CHANGE: 0
SHORTNESS OF BREATH: 0
CHEST TIGHTNESS: 0
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
## 2021-08-09 RX ORDER — HYDRALAZINE HYDROCHLORIDE 25 MG/1
TABLET, FILM COATED ORAL
Qty: 270 TABLET | Refills: 3 | Status: SHIPPED
Start: 2021-08-09 | End: 2022-01-04 | Stop reason: SDUPTHER

## 2021-08-20 RX ORDER — IVABRADINE 5 MG/1
TABLET, FILM COATED ORAL
Qty: 180 TABLET | Refills: 1 | Status: SHIPPED
Start: 2021-08-20 | End: 2022-01-04 | Stop reason: SDUPTHER

## 2021-08-24 ENCOUNTER — APPOINTMENT (OUTPATIENT)
Dept: GENERAL RADIOLOGY | Age: 46
DRG: 198 | End: 2021-08-24
Payer: MEDICAID

## 2021-08-24 ENCOUNTER — HOSPITAL ENCOUNTER (INPATIENT)
Age: 46
LOS: 1 days | Discharge: HOME | DRG: 198 | End: 2021-08-25
Attending: EMERGENCY MEDICINE | Admitting: STUDENT IN AN ORGANIZED HEALTH CARE EDUCATION/TRAINING PROGRAM
Payer: MEDICAID

## 2021-08-24 DIAGNOSIS — R07.9 CHEST PAIN, UNSPECIFIED TYPE: Primary | ICD-10-CM

## 2021-08-24 LAB
ALBUMIN SERPL-MCNC: 4 G/DL (ref 3.5–5.2)
ALP BLD-CCNC: 90 U/L (ref 40–129)
ALT SERPL-CCNC: 23 U/L (ref 0–40)
ANION GAP SERPL CALCULATED.3IONS-SCNC: 9 MMOL/L (ref 7–16)
AST SERPL-CCNC: 20 U/L (ref 0–39)
BACTERIA: NORMAL /HPF
BASOPHILS ABSOLUTE: 0.05 E9/L (ref 0–0.2)
BASOPHILS RELATIVE PERCENT: 0.8 % (ref 0–2)
BILIRUB SERPL-MCNC: 0.6 MG/DL (ref 0–1.2)
BILIRUBIN URINE: NEGATIVE
BLOOD, URINE: NEGATIVE
BUN BLDV-MCNC: 21 MG/DL (ref 6–20)
CALCIUM SERPL-MCNC: 9.2 MG/DL (ref 8.6–10.2)
CHLORIDE BLD-SCNC: 103 MMOL/L (ref 98–107)
CK MB: 3.7 NG/ML (ref 0–7.7)
CLARITY: CLEAR
CO2: 28 MMOL/L (ref 22–29)
COLOR: YELLOW
CREAT SERPL-MCNC: 2.3 MG/DL (ref 0.7–1.2)
EOSINOPHILS ABSOLUTE: 0.28 E9/L (ref 0.05–0.5)
EOSINOPHILS RELATIVE PERCENT: 4.6 % (ref 0–6)
EPITHELIAL CELLS, UA: NORMAL /HPF
GFR AFRICAN AMERICAN: 37
GFR NON-AFRICAN AMERICAN: 37 ML/MIN/1.73
GLUCOSE BLD-MCNC: 131 MG/DL (ref 74–99)
GLUCOSE URINE: NEGATIVE MG/DL
HCT VFR BLD CALC: 36.5 % (ref 37–54)
HEMOGLOBIN: 11.6 G/DL (ref 12.5–16.5)
IMMATURE GRANULOCYTES #: 0.01 E9/L
IMMATURE GRANULOCYTES %: 0.2 % (ref 0–5)
KETONES, URINE: NEGATIVE MG/DL
LACTIC ACID, SEPSIS: 1.2 MMOL/L (ref 0.5–1.9)
LEUKOCYTE ESTERASE, URINE: NEGATIVE
LYMPHOCYTES ABSOLUTE: 2.36 E9/L (ref 1.5–4)
LYMPHOCYTES RELATIVE PERCENT: 38.9 % (ref 20–42)
MAGNESIUM: 2 MG/DL (ref 1.6–2.6)
MCH RBC QN AUTO: 28.9 PG (ref 26–35)
MCHC RBC AUTO-ENTMCNC: 31.8 % (ref 32–34.5)
MCV RBC AUTO: 91 FL (ref 80–99.9)
MONOCYTES ABSOLUTE: 0.48 E9/L (ref 0.1–0.95)
MONOCYTES RELATIVE PERCENT: 7.9 % (ref 2–12)
NEUTROPHILS ABSOLUTE: 2.88 E9/L (ref 1.8–7.3)
NEUTROPHILS RELATIVE PERCENT: 47.6 % (ref 43–80)
NITRITE, URINE: NEGATIVE
PDW BLD-RTO: 13.6 FL (ref 11.5–15)
PH UA: 5.5 (ref 5–9)
PLATELET # BLD: 211 E9/L (ref 130–450)
PMV BLD AUTO: 10.6 FL (ref 7–12)
POTASSIUM REFLEX MAGNESIUM: 3.4 MMOL/L (ref 3.5–5)
PRO-BNP: 22 PG/ML (ref 0–125)
PROTEIN UA: NORMAL MG/DL
RBC # BLD: 4.01 E12/L (ref 3.8–5.8)
RBC UA: NORMAL /HPF (ref 0–2)
SODIUM BLD-SCNC: 140 MMOL/L (ref 132–146)
SPECIFIC GRAVITY UA: 1.02 (ref 1–1.03)
TOTAL CK: 805 U/L (ref 20–200)
TOTAL PROTEIN: 7.1 G/DL (ref 6.4–8.3)
TROPONIN, HIGH SENSITIVITY: 26 NG/L (ref 0–11)
TROPONIN, HIGH SENSITIVITY: 39 NG/L (ref 0–11)
TROPONIN, HIGH SENSITIVITY: 59 NG/L (ref 0–11)
UROBILINOGEN, URINE: 1 E.U./DL
WBC # BLD: 6.1 E9/L (ref 4.5–11.5)
WBC UA: NORMAL /HPF (ref 0–5)

## 2021-08-24 PROCEDURE — 99222 1ST HOSP IP/OBS MODERATE 55: CPT | Performed by: INTERNAL MEDICINE

## 2021-08-24 PROCEDURE — 87088 URINE BACTERIA CULTURE: CPT

## 2021-08-24 PROCEDURE — 80053 COMPREHEN METABOLIC PANEL: CPT

## 2021-08-24 PROCEDURE — 99284 EMERGENCY DEPT VISIT MOD MDM: CPT

## 2021-08-24 PROCEDURE — 83605 ASSAY OF LACTIC ACID: CPT

## 2021-08-24 PROCEDURE — 6360000002 HC RX W HCPCS: Performed by: STUDENT IN AN ORGANIZED HEALTH CARE EDUCATION/TRAINING PROGRAM

## 2021-08-24 PROCEDURE — 83880 ASSAY OF NATRIURETIC PEPTIDE: CPT

## 2021-08-24 PROCEDURE — G0378 HOSPITAL OBSERVATION PER HR: HCPCS

## 2021-08-24 PROCEDURE — APPSS180 APP SPLIT SHARED TIME > 60 MINUTES: Performed by: NURSE PRACTITIONER

## 2021-08-24 PROCEDURE — 36415 COLL VENOUS BLD VENIPUNCTURE: CPT

## 2021-08-24 PROCEDURE — 2580000003 HC RX 258: Performed by: STUDENT IN AN ORGANIZED HEALTH CARE EDUCATION/TRAINING PROGRAM

## 2021-08-24 PROCEDURE — 96372 THER/PROPH/DIAG INJ SC/IM: CPT

## 2021-08-24 PROCEDURE — 82550 ASSAY OF CK (CPK): CPT

## 2021-08-24 PROCEDURE — 71046 X-RAY EXAM CHEST 2 VIEWS: CPT

## 2021-08-24 PROCEDURE — 6370000000 HC RX 637 (ALT 250 FOR IP): Performed by: STUDENT IN AN ORGANIZED HEALTH CARE EDUCATION/TRAINING PROGRAM

## 2021-08-24 PROCEDURE — 83735 ASSAY OF MAGNESIUM: CPT

## 2021-08-24 PROCEDURE — 85025 COMPLETE CBC W/AUTO DIFF WBC: CPT

## 2021-08-24 PROCEDURE — 99222 1ST HOSP IP/OBS MODERATE 55: CPT | Performed by: STUDENT IN AN ORGANIZED HEALTH CARE EDUCATION/TRAINING PROGRAM

## 2021-08-24 PROCEDURE — 81001 URINALYSIS AUTO W/SCOPE: CPT

## 2021-08-24 PROCEDURE — 2060000000 HC ICU INTERMEDIATE R&B

## 2021-08-24 PROCEDURE — 84484 ASSAY OF TROPONIN QUANT: CPT

## 2021-08-24 PROCEDURE — 6370000000 HC RX 637 (ALT 250 FOR IP): Performed by: GENERAL PRACTICE

## 2021-08-24 PROCEDURE — 93005 ELECTROCARDIOGRAM TRACING: CPT | Performed by: GENERAL PRACTICE

## 2021-08-24 PROCEDURE — 82553 CREATINE MB FRACTION: CPT

## 2021-08-24 RX ORDER — SODIUM CHLORIDE 9 MG/ML
25 INJECTION, SOLUTION INTRAVENOUS PRN
Status: DISCONTINUED | OUTPATIENT
Start: 2021-08-24 | End: 2021-08-25 | Stop reason: HOSPADM

## 2021-08-24 RX ORDER — CARVEDILOL 25 MG/1
25 TABLET ORAL 2 TIMES DAILY WITH MEALS
Status: DISCONTINUED | OUTPATIENT
Start: 2021-08-24 | End: 2021-08-25

## 2021-08-24 RX ORDER — ATORVASTATIN CALCIUM 20 MG/1
40 TABLET, FILM COATED ORAL NIGHTLY
Status: DISCONTINUED | OUTPATIENT
Start: 2021-08-24 | End: 2021-08-25 | Stop reason: HOSPADM

## 2021-08-24 RX ORDER — SODIUM CHLORIDE 0.9 % (FLUSH) 0.9 %
5-40 SYRINGE (ML) INJECTION EVERY 12 HOURS SCHEDULED
Status: DISCONTINUED | OUTPATIENT
Start: 2021-08-24 | End: 2021-08-25 | Stop reason: HOSPADM

## 2021-08-24 RX ORDER — POLYETHYLENE GLYCOL 3350 17 G/17G
17 POWDER, FOR SOLUTION ORAL DAILY PRN
Status: DISCONTINUED | OUTPATIENT
Start: 2021-08-24 | End: 2021-08-25 | Stop reason: HOSPADM

## 2021-08-24 RX ORDER — ISOSORBIDE MONONITRATE 60 MG/1
120 TABLET, EXTENDED RELEASE ORAL DAILY
Status: DISCONTINUED | OUTPATIENT
Start: 2021-08-24 | End: 2021-08-25 | Stop reason: HOSPADM

## 2021-08-24 RX ORDER — SPIRONOLACTONE 25 MG/1
12.5 TABLET ORAL DAILY
Status: DISCONTINUED | OUTPATIENT
Start: 2021-08-24 | End: 2021-08-25 | Stop reason: HOSPADM

## 2021-08-24 RX ORDER — ASPIRIN 81 MG/1
81 TABLET, CHEWABLE ORAL DAILY
Status: DISCONTINUED | OUTPATIENT
Start: 2021-08-25 | End: 2021-08-24

## 2021-08-24 RX ORDER — ACETAMINOPHEN 325 MG/1
650 TABLET ORAL EVERY 6 HOURS PRN
Status: DISCONTINUED | OUTPATIENT
Start: 2021-08-24 | End: 2021-08-25 | Stop reason: HOSPADM

## 2021-08-24 RX ORDER — SODIUM CHLORIDE 0.9 % (FLUSH) 0.9 %
5-40 SYRINGE (ML) INJECTION PRN
Status: DISCONTINUED | OUTPATIENT
Start: 2021-08-24 | End: 2021-08-25 | Stop reason: HOSPADM

## 2021-08-24 RX ORDER — FAMOTIDINE 20 MG/1
40 TABLET, FILM COATED ORAL EVERY EVENING
Status: DISCONTINUED | OUTPATIENT
Start: 2021-08-24 | End: 2021-08-25 | Stop reason: HOSPADM

## 2021-08-24 RX ORDER — HYDRALAZINE HYDROCHLORIDE 25 MG/1
25 TABLET, FILM COATED ORAL EVERY 8 HOURS SCHEDULED
Status: DISCONTINUED | OUTPATIENT
Start: 2021-08-24 | End: 2021-08-25 | Stop reason: HOSPADM

## 2021-08-24 RX ORDER — ALBUTEROL SULFATE 90 UG/1
2 AEROSOL, METERED RESPIRATORY (INHALATION) EVERY 6 HOURS PRN
Status: DISCONTINUED | OUTPATIENT
Start: 2021-08-24 | End: 2021-08-24

## 2021-08-24 RX ORDER — ASPIRIN 81 MG/1
324 TABLET, CHEWABLE ORAL ONCE
Status: COMPLETED | OUTPATIENT
Start: 2021-08-24 | End: 2021-08-24

## 2021-08-24 RX ORDER — ONDANSETRON 4 MG/1
4 TABLET, ORALLY DISINTEGRATING ORAL EVERY 8 HOURS PRN
Status: DISCONTINUED | OUTPATIENT
Start: 2021-08-24 | End: 2021-08-25 | Stop reason: HOSPADM

## 2021-08-24 RX ORDER — FUROSEMIDE 40 MG/1
40 TABLET ORAL DAILY
Status: DISCONTINUED | OUTPATIENT
Start: 2021-08-24 | End: 2021-08-25 | Stop reason: HOSPADM

## 2021-08-24 RX ORDER — ACETAMINOPHEN 650 MG/1
650 SUPPOSITORY RECTAL EVERY 6 HOURS PRN
Status: DISCONTINUED | OUTPATIENT
Start: 2021-08-24 | End: 2021-08-25 | Stop reason: HOSPADM

## 2021-08-24 RX ORDER — ALBUTEROL SULFATE 2.5 MG/3ML
2.5 SOLUTION RESPIRATORY (INHALATION) EVERY 6 HOURS PRN
Status: DISCONTINUED | OUTPATIENT
Start: 2021-08-24 | End: 2021-08-25 | Stop reason: HOSPADM

## 2021-08-24 RX ORDER — ASPIRIN 81 MG/1
81 TABLET, CHEWABLE ORAL DAILY
Status: DISCONTINUED | OUTPATIENT
Start: 2021-08-25 | End: 2021-08-25 | Stop reason: HOSPADM

## 2021-08-24 RX ORDER — ONDANSETRON 2 MG/ML
4 INJECTION INTRAMUSCULAR; INTRAVENOUS EVERY 6 HOURS PRN
Status: DISCONTINUED | OUTPATIENT
Start: 2021-08-24 | End: 2021-08-25 | Stop reason: HOSPADM

## 2021-08-24 RX ADMIN — HYDRALAZINE HYDROCHLORIDE 25 MG: 25 TABLET, FILM COATED ORAL at 22:46

## 2021-08-24 RX ADMIN — SPIRONOLACTONE 12.5 MG: 25 TABLET ORAL at 22:47

## 2021-08-24 RX ADMIN — Medication 10 ML: at 22:48

## 2021-08-24 RX ADMIN — IVABRADINE 5 MG: 5 TABLET, FILM COATED ORAL at 22:48

## 2021-08-24 RX ADMIN — ATORVASTATIN CALCIUM 40 MG: 20 TABLET, FILM COATED ORAL at 20:03

## 2021-08-24 RX ADMIN — CARVEDILOL 25 MG: 25 TABLET, FILM COATED ORAL at 22:48

## 2021-08-24 RX ADMIN — ISOSORBIDE MONONITRATE 120 MG: 60 TABLET, EXTENDED RELEASE ORAL at 22:47

## 2021-08-24 RX ADMIN — ENOXAPARIN SODIUM 40 MG: 40 INJECTION SUBCUTANEOUS at 20:04

## 2021-08-24 RX ADMIN — ASPIRIN 324 MG: 81 TABLET, CHEWABLE ORAL at 13:44

## 2021-08-24 RX ADMIN — FAMOTIDINE 40 MG: 20 TABLET ORAL at 22:46

## 2021-08-24 ASSESSMENT — ENCOUNTER SYMPTOMS
SORE THROAT: 0
SINUS PRESSURE: 0
DIARRHEA: 0
NAUSEA: 1
COUGH: 0
EYE PAIN: 0
WHEEZING: 0
ABDOMINAL PAIN: 0
VOMITING: 0
SHORTNESS OF BREATH: 0
BACK PAIN: 0
EYE DISCHARGE: 0
CHEST TIGHTNESS: 1
EYE REDNESS: 0

## 2021-08-24 NOTE — H&P
Inga Pop MD at [place] [address]       Medications Prior to Admission:    Prior to Admission medications    Medication Sig Start Date End Date Taking? Authorizing Provider   CORLANOR 5 MG TABS tablet TAKE ONE TABLET BY MOUTH TWICE A DAY WITH MEALS 8/20/21   Mickie Jimenez MD   hydrALAZINE (APRESOLINE) 25 MG tablet TAKE THREE (3) TABLETS BY MOUTH THREE TIMES DAILY 8/9/21   Samara Padilla MD   diclofenac sodium (VOLTAREN) 1 % GEL Apply 4 g topically 4 times daily as needed for Pain 8/4/21 8/18/21  Dora Stephens PA-C   carvedilol (COREG) 25 MG tablet TAKE 2 TABLETS BY MOUTH 2 TIMES A DAY WITH MEALS 7/30/21   Julio Cruz MD   ASPIRIN LOW DOSE 81 MG chewable tablet CHEW AND SWALLOW  ONE TABLET BY MOUTH EVERY DAY 6/3/21   Julio Cruz MD   CPAP Machine MISC by Does not apply route nightly    Historical Provider, MD   furosemide (LASIX) 40 MG tablet TAKE ONE TABLET BY MOUTH EVERY DAY 4/19/21   Julio Cruz MD   famotidine (PEPCID) 40 MG tablet Take 1 tablet by mouth every evening 4/6/21   Lilian Kelsey DO   spironolactone (ALDACTONE) 25 MG tablet Take 0.5 tablets by mouth daily 2/17/21   Julio Cruz MD   isosorbide mononitrate (IMDUR) 120 MG extended release tablet TAKE ONE TABLET BY MOUTH EVERY DAY 11/2/20   Julio Cruz MD   Handicap Placard MISC by Does not apply route Patient cannot walk 200 ft without stopping to rest.    Expiration 7/2018 7/7/16   Iris Izquierdo MD   albuterol sulfate HFA (PROVENTIL HFA) 108 (90 BASE) MCG/ACT inhaler Inhale 2 puffs into the lungs every 6 hours as needed for Wheezing 3/16/16 5/4/23  Abram Alcantar DO       Allergies:    Patient has no known allergies. Social History:    reports that he has never smoked. He has never used smokeless tobacco. He reports current alcohol use. He reports current drug use. Drug: Marijuana. He states he drinks a couple drinks about twice a week and smokes marijuana maybe once a month or less.       Family History:   family history includes Cancer in his father; No Known Problems in his mother; Other in his sister. PHYSICAL EXAM:  Vitals:  /79   Pulse (!) 48   Temp 98.5 °F (36.9 °C)   Resp 18   SpO2 97%     General Appearance: alert and oriented to person, place and time and in no acute distress  Skin: warm and dry  Head: normocephalic and atraumatic  Eyes: pupils equal, round, and reactive to light, extraocular eye movements intact, conjunctivae normal  Neck: neck supple and non tender without mass   Pulmonary/Chest: clear to auscultation bilaterally- no wheezes, rales or rhonchi, normal air movement, no respiratory distress  Cardiovascular: normal rate, normal S1 and S2 and no carotid bruits  Abdomen: soft, non-tender, non-distended, normal bowel sounds, no masses or organomegaly  Extremities: no cyanosis, no clubbing and no edema  Neurologic: no cranial nerve deficit and speech normal    LABS:  Recent Labs     08/24/21  1223      K 3.4*      CO2 28   BUN 21*   CREATININE 2.3*   GLUCOSE 131*   CALCIUM 9.2       Recent Labs     08/24/21  1223   WBC 6.1   RBC 4.01   HGB 11.6*   HCT 36.5*   MCV 91.0   MCH 28.9   MCHC 31.8*   RDW 13.6      MPV 10.6       No results for input(s): POCGLU in the last 72 hours. Troponin [[id number]] (Abnormal)    Collected: 08/24/21 1223    Updated: 08/24/21 1322    Specimen Source: Blood     Troponin, High Sensitivity 26High  ng/L     Troponin [[id number]] (Abnormal)    Collected: 08/24/21 1340    Updated: 08/24/21 1429    Specimen Source: Blood     Troponin, High Sensitivity 39High  ng/L         Radiology: XR CHEST (2 VW)    Result Date: 8/24/2021  EXAMINATION: TWO XRAY VIEWS OF THE CHEST 8/24/2021 1:14 pm COMPARISON: 10/11/2017 HISTORY: ORDERING SYSTEM PROVIDED HISTORY: chest pain TECHNOLOGIST PROVIDED HISTORY: Reason for exam:->chest pain FINDINGS: The lungs are without acute focal process. There is no effusion or pneumothorax.  The cardiomediastinal silhouette is without acute process. The osseous structures are without acute process. No acute process. EKG: sinus bradycardia    ASSESSMENT:      Active Problems:    Chest pain  Resolved Problems:    * No resolved hospital problems. *      PLAN:    1. Chest pain  -patient with 15 minutes of chest pain this morning associated with SOB, diaphoresis and nausea  -Patient had cardiac cath 2/2016 which showed normal coronary arteries and had nuclear stress test 5/2021 which was normal   -troponins elevated and increasing, EKG not showing any acute signs of ischemia  -Cardiology consulted, recommended cycling troponins and deciding further plans from there  -Chest pain has now resolved, will repeat EKG if any more episodes of chest pain occur  -Hemodynamically stable, will monitor vitals and on tele    2. HTN  -History established, home meds carvedilol 25mg BID, hydralazine 75mg TID  -/79 on admission  -Continue home meds and monitor BP    3. JOLENE  -History established, patient uses CPAP at home but states he has not used it in a few months due to some defect with it  -Will order CPAP at night here    4. CKD 3b  -History established  -Cr baseline appears to be around 2.0 per EMR review  -Cr on admission 2.3  -Will monitor daily BMP    5. HFpEF  -Patient found to have nonischemic cardiomyopathy and had ICD implanted 1/2017  -Initial EF 20-25%, however on repeat TTE 9/2020 EF was 65%      Code Status: Full  DVT prophylaxis: Lovenox    NOTE: This report was transcribed using voice recognition software.  Every effort was made to ensure accuracy; however, inadvertent computerized transcription errors may be present.     Electronically signed by Alvaro Neely MD on 8/24/2021 at 5:33 PM

## 2021-08-24 NOTE — CONSULTS
Inpatient Cardiology Consultation      Reason for Consult:  Chest Pain     Consulting Physician: Dr. Iris Espino    Requesting Physician:  Dr. Karen Fu    Date of Consultation: 8/24/2021    HISTORY OF PRESENT ILLNESS:   Mr. Malgorzata Correia is a 39year old  male who follows with Dr. Trace Connor and Dr. Eden Momin. He was most recently seen in the office with Dr. Trace Connor on 02/16/2021. His medical history includes Nonischemic Cardiomyopathy s/p ICD, HTN, HLD, JOLENE, chronic HFrEF. Mr. Malgorzata Correia presented to [facility] ED on 08/24/2021 with complaints of chest pain. He states that prior to presentation over the course of the past month he has had 3 episodes of midsternal chest squeezing at rest that lasted approximately 10-15 minutes in duration. He denies accompanying dyspnea or diaphoresis with these episodes or recurrence with exertion. He states that prior to presentation on 08/24/2021 he had recurrent chest pain as he was sitting down getting ready to take his daughter to work however with this episode he was also extremely diaphoretic which alarmed him. He said that he has been under increased stress recently after the loss of his father-in-law approximately 2 months ago. He denies orthopnea and PND. Upon arrival to the ED his VS were oral temperature 97-72-20-90 6% on RA-124/84. EKG SR. Troponin of 26 and 39.  proBNP 22.  Chest x-ray unremarkable. H&H 11.6/36.5.  BUN/SCR 21/2.3. He received  mg. Cardiology was consulted for management of chest pain. Mr. Malgorzata Correia denies recurrence of chest pain since prior to presentation. Please note: past medical records were reviewed per electronic medical record (EMR) - see detailed reports under Past Medical/ Surgical History. Past Medical and Surgical History:   1. Nonischemic Cardiomyopathy s/p ICD in 01/2017  2. Cardiac catheterization, left and right ([facility]) 02/19/2016: Right dominant system, normal coronaries. Normal hemodynamics.   Normal resting spironolactone (ALDACTONE) 25 MG tablet Take 0.5 tablets by mouth daily 21   Randi Teran MD   isosorbide mononitrate (IMDUR) 120 MG extended release tablet TAKE ONE TABLET BY MOUTH EVERY DAY 20   Randi Teran MD   Sandi Combs MISC by Does not apply route Patient cannot walk 200 ft without stopping to rest.    Expiration 16   Edmar Celeste MD   albuterol sulfate HFA (PROVENTIL HFA) 108 (90 BASE) MCG/ACT inhaler Inhale 2 puffs into the lungs every 6 hours as needed for Wheezing 3/16/16 5/4/23  Jessica Dixon DO       Current Medications:    No current facility-administered medications for this encounter. Allergies:  Patient has no known allergies. Social History:  Denies tobacco and alcohol intake. States that he rarely smokes marijuana. Caffeine intake includes 2 cans of Pepsi daily. Family History:   Father  at the age of 46 from cancer of an unknown origin. His sister has a cardiac murmur, specifics unclear. REVIEW OF SYSTEMS:     · Constitutional: Denies fevers, chills, night sweats, and fatigue  · HEENT: Denies headaches, nose bleeds, and blurred vision,oral pain, abscess or lesion. · Musculoskeletal: Denies falls, pain to BLE with ambulation and edema to BLE. · Neurological: Denies dizziness and lightheadedness, numbness and tingling  · Cardiovascular: Complains of recurrent chest pain-see HPI. Denies palpitations, and feelings of heart racing. · Respiratory: Denies orthopnea and PND  · Gastrointestinal: Denies heartburn, nausea/vomiting, diarrhea and constipation, black/bloody, and tarry stools. · Genitourinary: Denies dysuria and hematuria  · Hematologic: Denies excessive bruising or bleeding  · Lymphatic: Denies lumps and bumps to neck, axilla, breast, and groin  · Endocrine: Denies excessive thirst. Denies intolerance to hot and cold  · GYN: Postmenopausal state; Denies vaginal bleeding.    · Psychiatric: Denies anxiety and (MRN [id number]) as of 8/24/2021 15:41   Ref. Range 8/24/2021 12:23 8/24/2021 13:40   Pro-BNP Latest Ref Range: 0 - 125 pg/mL 22    Troponin, High Sensitivity Latest Ref Range: 0 - 11 ng/L 26 (H) 39 (H)     08/24/2021 CXR:   No acute process    IMPRESSION and PLAN to follow per Dr. Janna Middleton    Electronically signed by GIOVANNY Mesa CNP on 8/24/2021 at 3:36 PM     Deya Phoenix 67Jerry Thacker MD    I have personally participated in a face-to-face and personally obtained history and performed physical exam on the date of service. I reviewed chart, vitals, labs and radiologic studies. I also participated in medical decision making with GIOVANNY Mesa CNP on the date of service All of the assessments and recommendations are from me and I agree with all of the pertinent clinical information, assessment and treatment plan. I have reviewed and edited the note above based on my findings during my history, exam, and decision making. Please see my additional contributions to the history, physical exam, assessment, and recommendations below. HISTORY OF PRESENT ILLNESS:     Reviewed, as above    Past medical history:  Reviewed, as above. Past surgical history:  Reviewed, as above. Current medications:  Reviewed, as above    Allergies:  Reviewed, as above    Social history:  Reviewed, as above    Family medical history:  Reviewed, as above. REVIEW OF SYSTEMS:   Reviewed, as above. PHYSICAL EXAM:   CONSTITUTIONAL:  awake, alert, cooperative, no apparent distress, and appears stated age  EYES:  lids and lashes normal and pupils equal, round and reactive to light, anicteric sclerae  HEAD:  normocepalic, without obvious abnormality, atraumatic, pink, moist mucous membranes.   NECK:  Supple, symmetrical, trachea midline, no adenopathy, thyroid symmetric, not enlarged and no tenderness, skin normal  HEMATOLOGIC/LYMPHATICS:  no cervical lymphadenopathy and no supraclavicular lymphadenopathy  LUNGS:  No increased work of breathing, good air exchange, clear to auscultation bilaterally, no crackles or wheezing  CARDIOVASCULAR:  Normal apical impulse, regular rate and rhythm, normal S1 and S2, no S3 or S4, and no murmur noted and no JVD, no carotid bruit, no pedal edema, good carotid upstroke bilaterally. ABDOMEN:  Soft, nontender, no masses, no hepatomegaly or splenomegaly, BS+  CHEST: nontender to palpation, expands symmetrically  MUSCULOSKELETAL:  No clubbing no cyanosis. there is no redness, warmth, or swelling of the joints  full range of motion noted  NEUROLOGIC:  Alert, awake,oriented x3, no focal neurologic deficit was appreciated  SKIN:  no bruising or bleeding, normal skin color, texture, turgor and no redness, warmth, or swelling    /71   Pulse (!) 49   Temp 97.8 °F (36.6 °C) (Oral)   Resp 18   Ht 5' 8\" (1.727 m)   Wt 210 lb (95.3 kg)   SpO2 95%   BMI 31.93 kg/m²       No intake/output data recorded. No intake/output data recorded. DATA:   I personally reviewed the admission EKG with the following interpretation: Sinus bradycardia, nonspecific T wave changes in the inferior leads    ECHO: 9/3/2020, Summary   Left ventricular internal dimensions were normal in diastole and systole. Mild left ventricular concentric hypertrophy noted. No regional wall motion abnormalities seen. Normal left ventricular ejection fraction. There is doppler evidence of stage I diastolic dysfunction. Mild tricuspid regurgitation.        Stress Test:   Angiography: Reviewed  Cardiology Labs:   BMP:    Lab Results   Component Value Date     08/25/2021    K 3.5 08/25/2021    K 3.4 08/24/2021     08/25/2021    CO2 29 08/25/2021    BUN 21 08/25/2021     CMP:    Lab Results   Component Value Date     08/25/2021    K 3.5 08/25/2021    K 3.4 08/24/2021     08/25/2021    CO2 29 08/25/2021    BUN 21 08/25/2021    PROT 7.1 08/24/2021     CBC:    Lab Results Component Value Date    WBC 7.9 08/25/2021    RBC 4.78 08/25/2021    HGB 13.7 08/25/2021    HCT 43.1 08/25/2021    MCV 90.2 08/25/2021    RDW 13.7 08/25/2021     08/25/2021     PT/INR:  No results found for: PTINR  PT/INR Warfarin:  No components found for: PTPATWAR, PTINRWAR  PTT:  No results found for: APTT  PTT Heparin:  No components found for: APTTHEP  Magnesium:    Lab Results   Component Value Date    MG 2.0 08/24/2021     TSH:    Lab Results   Component Value Date    TSH 1.070 08/27/2015     TROPONIN:  No components found for: TROP  BNP:  No results found for: BNP  FASTING LIPID PANEL:    Lab Results   Component Value Date    CHOL 142 08/25/2021    HDL 29 08/25/2021    TRIG 257 08/25/2021     XR CHEST (2 VW)   Final Result   No acute process. I have personally reviewed the laboratory, cardiac diagnostic and radiographic testing as outlined above:    IMPRESSION:  1. Chest pain: Atypical, negative Lexiscan stress test in May 2021, no further cardiac work-up is planned at this point of time  2. Cardiomyopathy: Nonischemic, s/p AICD in situ  3. Tricuspid valve regurgitation: Mild  4. Hypertension: Controlled  5. Hyperlipidemia: On statin  6. Stage III chronic kidney disease      RECOMMENDATIONS:   1. Continue current treatment  2. Basic metabolic panel and CBC in a.m.  3. Further cardiac recommendations will be forthcoming pending his clinical course and diagnostic test findings    I have reviewed my findings and recommendations with patient    Thank you for the consult  Electronically signed by Jalyn Mercado MD on 9/6/2021 at 6:00 PM  NOTE: This report was transcribed using voice recognition software.  Every effort was made to ensure accuracy; however, inadvertent computerized transcription errors may be present

## 2021-08-24 NOTE — ED PROVIDER NOTES
ED  Provider Note  Admit Date/RoomTime: 8/24/2021 12:12 PM  ED Room: 19/19     HPI:    Aura Reddy is a 39 y.o. male presenting to the ED for chest pain, beginning 20 minutes ago. History comes primarily from the patient. Past medical history includes chronic kidney disease, coronary artery disease, prior MI with resulting ejection fraction of 15% (most recent echocardiogram reveals recovered ejection fraction of 65%), AICD placement, asthma, obesity. The complaint has been intermittent, mild in severity, improved by rest and worsened by light exertion. Associated symptoms include sweats. Sherri West states that yesterday he had a momentary twinge of chest pain which went away spontaneously. Today he was getting ready to take his daughter to work, when he developed a tight, bandlike sensation of chest tightness with associated shortness of breath and onset of spontaneous sweats. This made it very uncomfortable and he had to sit down. This pain spontaneously resolved after a few minutes. Due to significant prior cardiac history he became concerned and presented to [facility] emergency department for further evaluation and treatment. On arrival, the patient was assessed with history, physical exam, imaging studies, laboratory studies and ekg, vital signs. Vital signs were stable on arrival and the patient was afebrile. Review of Systems   Constitutional: Positive for diaphoresis. Negative for chills and fever. HENT: Negative for ear pain, sinus pressure and sore throat. Eyes: Negative for pain, discharge and redness. Respiratory: Positive for chest tightness. Negative for cough, shortness of breath and wheezing. Cardiovascular: Positive for chest pain. Gastrointestinal: Positive for nausea. Negative for abdominal pain, diarrhea and vomiting. Genitourinary: Negative for dysuria and frequency. Musculoskeletal: Negative for arthralgias and back pain.    Skin: Negative for rash and wound. Neurological: Negative for weakness and headaches. Hematological: Negative for adenopathy. All other systems reviewed and are negative. Physical Exam  Constitutional:       General: He is not in acute distress. Appearance: He is well-developed. He is not diaphoretic. HENT:      Head: Normocephalic and atraumatic. Mouth/Throat:      Dentition: Abnormal dentition. Eyes:      Pupils: Pupils are equal, round, and reactive to light. Neck:      Vascular: No JVD. Trachea: No tracheal deviation. Cardiovascular:      Rate and Rhythm: Regular rhythm. Heart sounds: No murmur heard. No friction rub. No gallop. Pulmonary:      Effort: Pulmonary effort is normal. No respiratory distress. Breath sounds: Normal breath sounds. No stridor. No wheezing or rales. Chest:      Chest wall: No tenderness. Abdominal:      General: Bowel sounds are normal. There is no distension. Palpations: Abdomen is soft. Tenderness: There is no abdominal tenderness. There is no guarding. Musculoskeletal:         General: Normal range of motion. Cervical back: Normal range of motion. Skin:     General: Skin is warm and dry. Capillary Refill: Capillary refill takes less than 2 seconds. Neurological:      General: No focal deficit present. Mental Status: He is alert. Cranial Nerves: No cranial nerve deficit. Psychiatric:         Behavior: Behavior normal.     EKG interpretation  Rate 53  Sinus rhythm  Rightward axis  AK, QRS, QT intervals show no evidence prolongation  T wave inversions in leads II, 3, aVF, unchanged from previous  No significant changes compared to previous study.     Procedures     MDM  Number of Diagnoses or Management Options  Diagnosis management comments: Emergency department evaluation was notable for findings of climbing troponin levels in a patient with a considerable previous cardiac history in the setting of chest pain with nausea and diaphoresis brought on with exertion. There are numerous risk factors that make this patient a moderate to high risk for development of acute coronary syndrome, and he needs to be closely monitored in the inpatient setting and evaluated by cardiology urgently. This information was relayed to the patient who understood this plan of care and was amenable to the plan. Patient was discussed with the admitting service (Dr. Nathan Shelby) who concurred with the decision for admission, and have agreed to admit the patient to telemetry          --------------------------------------------- PAST HISTORY ---------------------------------------------  Past Medical History:  has a past medical history of AICD (automatic cardioverter/defibrillator) present, Asthma, CAD (coronary artery disease), Cardiac LV ejection fraction 10-20%, CHF (congestive heart failure) ([facility] [address] CKD (chronic kidney disease), COPD (chronic obstructive pulmonary disease) ([facility] [address] Depression, Diverticulitis, GERD (gastroesophageal reflux disease), Hyperlipidemia, Hypertension, Nonischemic cardiomyopathy ([facility] [address] Panic attacks, and Sleep apnea. Past Surgical History:  has a past surgical history that includes Diagnostic Cardiac Cath Lab Procedure (2015); Cardiac defibrillator placement (Left, 01/27/2017); and pr colonoscopy flx dx w/collj spec when pfrmd (N/A, 9/6/2018). Social History:  reports that he has never smoked. He has never used smokeless tobacco. He reports current alcohol use. He reports current drug use. Drug: Marijuana. Family History: family history includes Cancer in his father; No Known Problems in his mother; Other in his sister. The patients home medications have been reviewed. Allergies: Patient has no known allergies.     -------------------------------------------------- RESULTS -------------------------------------------------    LABS:  Results for orders placed or performed during the hospital encounter of 08/24/21   CBC Auto Differential   Result Value Ref Range    WBC 6.1 4.5 - 11.5 E9/L    RBC 4.01 3.80 - 5.80 E12/L    Hemoglobin 11.6 (L) 12.5 - 16.5 g/dL    Hematocrit 36.5 (L) 37.0 - 54.0 %    MCV 91.0 80.0 - 99.9 fL    MCH 28.9 26.0 - 35.0 pg    MCHC 31.8 (L) 32.0 - 34.5 %    RDW 13.6 11.5 - 15.0 fL    Platelets 861 346 - 873 E9/L    MPV 10.6 7.0 - 12.0 fL    Neutrophils % 47.6 43.0 - 80.0 %    Immature Granulocytes % 0.2 0.0 - 5.0 %    Lymphocytes % 38.9 20.0 - 42.0 %    Monocytes % 7.9 2.0 - 12.0 %    Eosinophils % 4.6 0.0 - 6.0 %    Basophils % 0.8 0.0 - 2.0 %    Neutrophils Absolute 2.88 1.80 - 7.30 E9/L    Immature Granulocytes # 0.01 E9/L    Lymphocytes Absolute 2.36 1.50 - 4.00 E9/L    Monocytes Absolute 0.48 0.10 - 0.95 E9/L    Eosinophils Absolute 0.28 0.05 - 0.50 E9/L    Basophils Absolute 0.05 0.00 - 0.20 E9/L   Comprehensive Metabolic Panel w/ Reflex to MG   Result Value Ref Range    Sodium 140 132 - 146 mmol/L    Potassium reflex Magnesium 3.4 (L) 3.5 - 5.0 mmol/L    Chloride 103 98 - 107 mmol/L    CO2 28 22 - 29 mmol/L    Anion Gap 9 7 - 16 mmol/L    Glucose 131 (H) 74 - 99 mg/dL    BUN 21 (H) 6 - 20 mg/dL    CREATININE 2.3 (H) 0.7 - 1.2 mg/dL    GFR Non-African American 37 >=60 mL/min/1.73    GFR African American 37     Calcium 9.2 8.6 - 10.2 mg/dL    Total Protein 7.1 6.4 - 8.3 g/dL    Albumin 4.0 3.5 - 5.2 g/dL    Total Bilirubin 0.6 0.0 - 1.2 mg/dL    Alkaline Phosphatase 90 40 - 129 U/L    ALT 23 0 - 40 U/L    AST 20 0 - 39 U/L   Troponin   Result Value Ref Range    Troponin, High Sensitivity 26 (H) 0 - 11 ng/L   Brain Natriuretic Peptide   Result Value Ref Range    Pro-BNP 22 0 - 125 pg/mL   Urinalysis, reflex to microscopic   Result Value Ref Range    Color, UA Yellow Straw/Yellow    Clarity, UA Clear Clear    Glucose, Ur Negative Negative mg/dL    Bilirubin Urine Negative Negative    Ketones, Urine Negative Negative mg/dL    Specific Gravity, UA 1.025 1.005 - 1.030    Blood, Urine Negative Negative    pH, UA 5.5 5.0 - 9.0    Protein, UA TRACE Negative mg/dL    Urobilinogen, Urine 1.0 <2.0 E.U./dL    Nitrite, Urine Negative Negative    Leukocyte Esterase, Urine Negative Negative   Lactate, Sepsis   Result Value Ref Range    Lactic Acid, Sepsis 1.2 0.5 - 1.9 mmol/L   Microscopic Urinalysis   Result Value Ref Range    WBC, UA NONE 0 - 5 /HPF    RBC, UA NONE 0 - 2 /HPF    Epithelial Cells, UA NONE SEEN /HPF    Bacteria, UA NONE SEEN None Seen /HPF   Magnesium   Result Value Ref Range    Magnesium 2.0 1.6 - 2.6 mg/dL   Troponin   Result Value Ref Range    Troponin, High Sensitivity 39 (H) 0 - 11 ng/L   EKG 12 Lead   Result Value Ref Range    Ventricular Rate 53 BPM    Atrial Rate 53 BPM    P-R Interval 156 ms    QRS Duration 100 ms    Q-T Interval 432 ms    QTc Calculation (Bazett) 405 ms    P Axis 30 degrees    R Axis 103 degrees    T Axis -23 degrees       RADIOLOGY:  XR CHEST (2 VW)   Final Result   No acute process. ------------------------- NURSING NOTES AND VITALS REVIEWED ---------------------------  Date / Time Roomed:  8/24/2021 12:12 PM  ED Bed Assignment:  19/19    The nursing notes within the ED encounter and vital signs as below have been reviewed. Patient Vitals for the past 24 hrs:   BP Temp Pulse Resp SpO2   08/24/21 1514 123/79 -- (!) 48 18 97 %   08/24/21 1346 115/75 -- 50 20 95 %   08/24/21 1224 135/76 98.5 °F (36.9 °C) 55 18 96 %   08/24/21 1219 135/76 -- -- -- --       Oxygen Saturation Interpretation: Normal    ------------------------------------------ PROGRESS NOTES ------------------------------------------  Re-evaluation(s):  Time: 4:07 PM EDT  Patients symptoms show no change  Repeat physical examination is not changed    Counseling:  I have spoken with the patient and discussed todays results, in addition to providing specific details for the plan of care and counseling regarding the diagnosis and prognosis.   Their questions are answered at this time and they

## 2021-08-25 VITALS
RESPIRATION RATE: 18 BRPM | OXYGEN SATURATION: 95 % | HEIGHT: 68 IN | BODY MASS INDEX: 31.83 KG/M2 | HEART RATE: 49 BPM | SYSTOLIC BLOOD PRESSURE: 110 MMHG | WEIGHT: 210 LBS | DIASTOLIC BLOOD PRESSURE: 71 MMHG | TEMPERATURE: 97.8 F

## 2021-08-25 LAB
ANION GAP SERPL CALCULATED.3IONS-SCNC: 8 MMOL/L (ref 7–16)
BUN BLDV-MCNC: 21 MG/DL (ref 6–20)
CALCIUM SERPL-MCNC: 9.3 MG/DL (ref 8.6–10.2)
CHLORIDE BLD-SCNC: 105 MMOL/L (ref 98–107)
CHOLESTEROL, TOTAL: 140 MG/DL (ref 0–199)
CHOLESTEROL, TOTAL: 142 MG/DL (ref 0–199)
CK MB: 2.8 NG/ML (ref 0–7.7)
CO2: 29 MMOL/L (ref 22–29)
CREAT SERPL-MCNC: 2.1 MG/DL (ref 0.7–1.2)
EKG ATRIAL RATE: 53 BPM
EKG ATRIAL RATE: 54 BPM
EKG P AXIS: 30 DEGREES
EKG P AXIS: 31 DEGREES
EKG P-R INTERVAL: 150 MS
EKG P-R INTERVAL: 156 MS
EKG Q-T INTERVAL: 432 MS
EKG Q-T INTERVAL: 438 MS
EKG QRS DURATION: 100 MS
EKG QRS DURATION: 102 MS
EKG QTC CALCULATION (BAZETT): 405 MS
EKG QTC CALCULATION (BAZETT): 415 MS
EKG R AXIS: 103 DEGREES
EKG R AXIS: 32 DEGREES
EKG T AXIS: -23 DEGREES
EKG T AXIS: 42 DEGREES
EKG VENTRICULAR RATE: 53 BPM
EKG VENTRICULAR RATE: 54 BPM
GFR AFRICAN AMERICAN: 41
GFR NON-AFRICAN AMERICAN: 41 ML/MIN/1.73
GLUCOSE BLD-MCNC: 99 MG/DL (ref 74–99)
HCT VFR BLD CALC: 42.9 % (ref 37–54)
HCT VFR BLD CALC: 43.1 % (ref 37–54)
HDLC SERPL-MCNC: 28 MG/DL
HDLC SERPL-MCNC: 29 MG/DL
HEMOGLOBIN: 13.7 G/DL (ref 12.5–16.5)
HEMOGLOBIN: 13.9 G/DL (ref 12.5–16.5)
LACTIC ACID, SEPSIS: 0.9 MMOL/L (ref 0.5–1.9)
LDL CHOLESTEROL CALCULATED: 60 MG/DL (ref 0–99)
LDL CHOLESTEROL CALCULATED: 62 MG/DL (ref 0–99)
MCH RBC QN AUTO: 28.7 PG (ref 26–35)
MCH RBC QN AUTO: 29.1 PG (ref 26–35)
MCHC RBC AUTO-ENTMCNC: 31.8 % (ref 32–34.5)
MCHC RBC AUTO-ENTMCNC: 32.4 % (ref 32–34.5)
MCV RBC AUTO: 89.9 FL (ref 80–99.9)
MCV RBC AUTO: 90.2 FL (ref 80–99.9)
PDW BLD-RTO: 13.4 FL (ref 11.5–15)
PDW BLD-RTO: 13.7 FL (ref 11.5–15)
PLATELET # BLD: 251 E9/L (ref 130–450)
PLATELET # BLD: 263 E9/L (ref 130–450)
PMV BLD AUTO: 10.6 FL (ref 7–12)
PMV BLD AUTO: 10.9 FL (ref 7–12)
POTASSIUM SERPL-SCNC: 3.5 MMOL/L (ref 3.5–5)
RBC # BLD: 4.77 E12/L (ref 3.8–5.8)
RBC # BLD: 4.78 E12/L (ref 3.8–5.8)
SODIUM BLD-SCNC: 142 MMOL/L (ref 132–146)
TOTAL CK: 535 U/L (ref 20–200)
TRIGL SERPL-MCNC: 257 MG/DL (ref 0–149)
TRIGL SERPL-MCNC: 258 MG/DL (ref 0–149)
TROPONIN, HIGH SENSITIVITY: 35 NG/L (ref 0–11)
VLDLC SERPL CALC-MCNC: 51 MG/DL
VLDLC SERPL CALC-MCNC: 52 MG/DL
WBC # BLD: 7.5 E9/L (ref 4.5–11.5)
WBC # BLD: 7.9 E9/L (ref 4.5–11.5)

## 2021-08-25 PROCEDURE — G0378 HOSPITAL OBSERVATION PER HR: HCPCS

## 2021-08-25 PROCEDURE — 82553 CREATINE MB FRACTION: CPT

## 2021-08-25 PROCEDURE — 96372 THER/PROPH/DIAG INJ SC/IM: CPT

## 2021-08-25 PROCEDURE — 80048 BASIC METABOLIC PNL TOTAL CA: CPT

## 2021-08-25 PROCEDURE — 2580000003 HC RX 258: Performed by: STUDENT IN AN ORGANIZED HEALTH CARE EDUCATION/TRAINING PROGRAM

## 2021-08-25 PROCEDURE — 6370000000 HC RX 637 (ALT 250 FOR IP): Performed by: STUDENT IN AN ORGANIZED HEALTH CARE EDUCATION/TRAINING PROGRAM

## 2021-08-25 PROCEDURE — 93005 ELECTROCARDIOGRAM TRACING: CPT | Performed by: STUDENT IN AN ORGANIZED HEALTH CARE EDUCATION/TRAINING PROGRAM

## 2021-08-25 PROCEDURE — 6370000000 HC RX 637 (ALT 250 FOR IP): Performed by: INTERNAL MEDICINE

## 2021-08-25 PROCEDURE — 85027 COMPLETE CBC AUTOMATED: CPT

## 2021-08-25 PROCEDURE — 82550 ASSAY OF CK (CPK): CPT

## 2021-08-25 PROCEDURE — 84484 ASSAY OF TROPONIN QUANT: CPT

## 2021-08-25 PROCEDURE — 99232 SBSQ HOSP IP/OBS MODERATE 35: CPT | Performed by: INTERNAL MEDICINE

## 2021-08-25 PROCEDURE — 6360000002 HC RX W HCPCS: Performed by: STUDENT IN AN ORGANIZED HEALTH CARE EDUCATION/TRAINING PROGRAM

## 2021-08-25 PROCEDURE — 99239 HOSP IP/OBS DSCHRG MGMT >30: CPT | Performed by: STUDENT IN AN ORGANIZED HEALTH CARE EDUCATION/TRAINING PROGRAM

## 2021-08-25 PROCEDURE — APPSS60 APP SPLIT SHARED TIME 46-60 MINUTES: Performed by: NURSE PRACTITIONER

## 2021-08-25 PROCEDURE — 80061 LIPID PANEL: CPT

## 2021-08-25 PROCEDURE — 94660 CPAP INITIATION&MGMT: CPT

## 2021-08-25 PROCEDURE — 36415 COLL VENOUS BLD VENIPUNCTURE: CPT

## 2021-08-25 RX ORDER — CARVEDILOL 6.25 MG/1
12.5 TABLET ORAL 2 TIMES DAILY WITH MEALS
Status: DISCONTINUED | OUTPATIENT
Start: 2021-08-25 | End: 2021-08-25 | Stop reason: HOSPADM

## 2021-08-25 RX ORDER — CARVEDILOL 25 MG/1
25 TABLET ORAL 2 TIMES DAILY
Qty: 180 TABLET | Refills: 3
Start: 2021-08-25 | End: 2022-09-14 | Stop reason: SDUPTHER

## 2021-08-25 RX ADMIN — FUROSEMIDE 40 MG: 40 TABLET ORAL at 08:49

## 2021-08-25 RX ADMIN — IVABRADINE 5 MG: 5 TABLET, FILM COATED ORAL at 17:15

## 2021-08-25 RX ADMIN — ISOSORBIDE MONONITRATE 120 MG: 60 TABLET, EXTENDED RELEASE ORAL at 08:49

## 2021-08-25 RX ADMIN — Medication 5 ML: at 09:00

## 2021-08-25 RX ADMIN — ASPIRIN 81 MG: 81 TABLET, CHEWABLE ORAL at 08:49

## 2021-08-25 RX ADMIN — CARVEDILOL 25 MG: 25 TABLET, FILM COATED ORAL at 08:49

## 2021-08-25 RX ADMIN — CARVEDILOL 12.5 MG: 6.25 TABLET, FILM COATED ORAL at 17:14

## 2021-08-25 RX ADMIN — ENOXAPARIN SODIUM 40 MG: 40 INJECTION SUBCUTANEOUS at 08:49

## 2021-08-25 RX ADMIN — IVABRADINE 5 MG: 5 TABLET, FILM COATED ORAL at 09:18

## 2021-08-25 RX ADMIN — HYDRALAZINE HYDROCHLORIDE 25 MG: 25 TABLET, FILM COATED ORAL at 15:22

## 2021-08-25 RX ADMIN — HYDRALAZINE HYDROCHLORIDE 25 MG: 25 TABLET, FILM COATED ORAL at 06:26

## 2021-08-25 RX ADMIN — SPIRONOLACTONE 12.5 MG: 25 TABLET ORAL at 08:49

## 2021-08-25 NOTE — PROGRESS NOTES
[address] [place], [place] [address]ist   Progress Note    Admitting Date and Time: 8/24/2021 12:12 PM  Admit Dx: Chest pain [R07.9]  Chest pain, unspecified type [R07.9]    Subjective:    Pt feels fine today, states his chest pain has not returned. Denies any other new symptoms either. Per RN: Doing well    ROS: denies fever, chills, cp, sob, n/v, HA unless stated above.      sodium chloride flush  5-40 mL IntraVENous 2 times per day    atorvastatin  40 mg Oral Nightly    enoxaparin  40 mg Subcutaneous Daily    isosorbide mononitrate  120 mg Oral Daily    spironolactone  12.5 mg Oral Daily    hydrALAZINE  25 mg Oral 3 times per day    furosemide  40 mg Oral Daily    famotidine  40 mg Oral QPM    ivabradine  5 mg Oral BID WC    carvedilol  25 mg Oral BID WC    aspirin  81 mg Oral Daily     sodium chloride flush, 5-40 mL, PRN  sodium chloride, 25 mL, PRN  ondansetron, 4 mg, Q8H PRN   Or  ondansetron, 4 mg, Q6H PRN  acetaminophen, 650 mg, Q6H PRN   Or  acetaminophen, 650 mg, Q6H PRN  polyethylene glycol, 17 g, Daily PRN  albuterol, 2.5 mg, Q6H PRN         Objective:    /62   Pulse 56   Temp 98.6 °F (37 °C) (Oral)   Resp 18   Ht 5' 8\" (1.727 m)   Wt 210 lb (95.3 kg)   SpO2 95%   BMI 31.93 kg/m²   General Appearance: alert and oriented to person, place and time and in no acute distress  Skin: warm and dry  Head: normocephalic and atraumatic  Eyes: pupils equal, round, and reactive to light, extraocular eye movements intact, conjunctivae normal  Neck: neck supple and non tender without mass   Pulmonary/Chest: clear to auscultation bilaterally- no wheezes, rales or rhonchi, normal air movement, no respiratory distress  Cardiovascular: normal rate, normal S1 and S2 and no carotid bruits  Abdomen: soft, non-tender, non-distended, normal bowel sounds, no masses or organomegaly  Extremities: no cyanosis, no clubbing and no edema  Neurologic: no cranial nerve deficit and speech normal      Recent Labs     08/24/21  1223      K 3.4*      CO2 28   BUN 21*   CREATININE 2.3*   GLUCOSE 131*   CALCIUM 9.2       Recent Labs     08/24/21  1223   ALKPHOS 90   PROT 7.1   LABALBU 4.0   BILITOT 0.6   AST 20   ALT 23       Recent Labs     08/24/21  1223 08/25/21  0531 08/25/21  0652   WBC 6.1 7.5 7.9   RBC 4.01 4.77 4.78   HGB 11.6* 13.9 13.7   HCT 36.5* 42.9 43.1   MCV 91.0 89.9 90.2   MCH 28.9 29.1 28.7   MCHC 31.8* 32.4 31.8*   RDW 13.6 13.4 13.7    263 251   MPV 10.6 10.9 10.6       Troponin [[id number]] (Abnormal)    Collected: 08/24/21 1650    Updated: 08/24/21 1754    Specimen Source: Blood     Troponin, High Sensitivity 59High  ng/L    Comment: High Sensitivity Troponin values cannot be compared with   other Troponin methodologies. Patients with high levels of Biotin oral intake (i.e. >5 mg/day)   may have falsely decreased Troponin levels. Samples collected   within 8 hours of biotin intake may require additional information   for diagnosis. Troponin [[id number]] (Abnormal)    Collected: 08/24/21 1340    Updated: 08/24/21 1429    Specimen Source: Blood     Troponin, High Sensitivity 39High  ng/L     Lipid panel - fasting [[id number]] (Abnormal)    Collected: 08/25/21 0531    Updated: 08/25/21 0645    Specimen Source: Blood     Cholesterol, Total 140 mg/dL    Triglycerides 258High  mg/dL    HDL 28 mg/dL    LDL Calculated 60 mg/dL    VLDL Cholesterol Calculated 52 mg/dL         Radiology:   XR CHEST (2 VW)   Final Result   No acute process. Assessment:  Active Problems:    Chest pain  Resolved Problems:    * No resolved hospital problems. *      Plan:  1.  Chest pain  -patient with 15 minutes of chest pain associated with SOB, diaphoresis and nausea on day of admission  -Patient had cardiac cath 2/2016 which showed normal coronary arteries and had nuclear stress test 5/2021 which was normal   -troponins elevated and increasing, EKG not showing any acute signs of ischemia  -Cardiology consulted, recommended cycling troponins and deciding further plans from there. Stated no need for repeat stress and wouldn't do cardiac CTA due to current renal failure.  -Chest pain has now resolved, will repeat EKG if any more episodes of chest pain occur  -Hemodynamically stable, will monitor vitals and on tele     2. HTN  -History established, home meds carvedilol 25mg BID, hydralazine 75mg TID  -/79 on admission, today 103/62  -Continue home meds and monitor BP     3. JOLENE  -History established, patient uses CPAP at home but states he has not used it in a few months due to some defect with it  -Will order CPAP at night here     4. CKD 3b  -History established  -Cr baseline appears to be around 2.0 per EMR review  -Cr on admission 2.3, today 2.1  -Will monitor daily BMP     5. HFpEF  -Patient found to have nonischemic cardiomyopathy and had ICD implanted 1/2017  -Initial EF 20-25%, however on repeat TTE 9/2020 EF was 65%    NOTE: This report was transcribed using voice recognition software. Every effort was made to ensure accuracy; however, inadvertent computerized transcription errors may be present.      Electronically signed by Markell Aldridge MD on 8/25/2021 at 10:30 AM

## 2021-08-25 NOTE — CARE COORDINATION
8/25/21 1153 CM note: NO COVID TESTING THIS ADMIT. Met with patient at the bedside to discuss transition of care at discharge. Patient resides with his 2 sons and girlfriend in a 3rd floor apartment with @ 15-18 steps to enter. Patient is independent with ADLs, works, drives, and DME includes a cpap from [address] [place] [address]. Pts PCP is Enrico Moritz and his pharmacy is [facility] in [place]. Patient has no hx HHC or BIJAN. Discharge plan is home and patient declines [facility] or any other needs at this time. Pts girlfriend or son will provide transportation home.  Electronically signed by Katerina Montenegro RN on 8/25/2021 at 11:57 AM

## 2021-08-25 NOTE — PROGRESS NOTES
Inpatient Cardiology Progress note     PATIENT IS BEING FOLLOWED FOR: Chest Pain     Camelia Landau is a 70-year-old -American male who follows with Dr. Franklyn Atkinson and Dr. Erick Knight. SUBJECTIVE: Denies recurrent chest pain since prior to presentation. Denies AYOUB, orthopnea and PND. OBJECTIVE: No apparent distress     ROS:  Consist: Denies fevers, chills or night sweats  Heart: Denies chest pain, palpitations, lightheadedness, dizziness or syncope  Lungs: Denies SOB, cough, wheezing, orthopnea or PND  GI: Denies abdominal pain, vomiting or diarrhea    PHYSICAL EXAM:   /67   Pulse 55   Temp 97.8 °F (36.6 °C) (Oral)   Resp 18   Ht 5' 8\" (1.727 m)   Wt 210 lb (95.3 kg)   SpO2 95%   BMI 31.93 kg/m²    CONST:  Well developed, obese middle-aged male who appears stated age. Awake, alert, cooperative, no apparent distress  HEENT:   Head- Normocephalic, atraumatic   Eyes- Conjunctivae pink, anicteric  Throat- Oral mucosa pink and moist  Neck-  No stridor, trachea midline, no jugular venous distention. No adenopathy   CHEST: Chest symmetrical and non-tender to palpation. No accessory muscle use or intercostal retractions  RESPIRATORY: Lung sounds - clear throughout fields   CARDIOVASCULAR:     No carotid bruit  Heart Inspection- shows no noted pulsations  Heart Palpation- no heaves or thrills; PMI is non-displaced   Heart Ausculation- Regular rate and rhythm, no murmur. No s3, s4 or rub   PV: No lower extremity edema. No varicosities. Pedal pulses palpable, no clubbing or cyanosis   ABDOMEN: Soft, non-tender to light palpation. Bowel sounds present. No palpable masses no organomegaly; no abdominal bruit  MS: Good muscle strength and tone. No atrophy or abnormal movements. : Deferred  SKIN: Warm and dry no statis dermatitis or ulcers   NEURO / PSYCH: Oriented to person, place and time. Speech clear and appropriate. Follows all commands.  Pleasant affect         Weight:   Wt Readings from Last 3 Imdur  4. HTN: Controlled  5. HLD, on statin  6. Obesity  7. JOELNE with compliance with CPAP  8. COPD  9. CKD stage III with BUN/SCr 21/2.3 (08/24)-->21/2.1 (08/25)  10. Hx GERD and Diverticulitis  11. Hx Depression, and Panic Attacks      PLAN:  1. No need to repeat stress test, as this was recently done. 2. Would not perform coronary CTA at this time in view of renal status  3. Will discuss case with Dr. Amberly Yuan    Electronically signed by GIOVANNY Ba CNP on 8/25/2021 at [facility] Cardiology progress note  Deng Machuca     I have personally participated in a face-to-face and personally obtained history and performed physical exam on the date of service. I reviewed chart, vitals, labs and radiologic studies. I also participated in medical decision making with GIOVANNY Ba CNP on the date of service All of the assessments and recommendations are from me and I agree with all of the pertinent clinical information, assessment and treatment plan. I have reviewed and edited the note above based on my findings during my history, exam, and decision making. Please see my additional contributions to the history, physical exam, assessment, and recommendations below. Patient is seen in follow-up for chest pain    Subjective:     Mr. Kristine Gorman feels better today, denies any chest pain or shortness of breath  Lying in bed no apparent distress    Review of systems:  Reviewed, as above    Medication side effects: none    Scheduled Meds: Reviewed, as above  Continuous Infusions: Reviewed, as above. PRN Meds: Reviewed, as above. No intake/output data recorded. No intake/output data recorded.       Objective:      Physical Exam:   /71   Pulse (!) 49   Temp 97.8 °F (36.6 °C) (Oral)   Resp 18   Ht 5' 8\" (1.727 m)   Wt 210 lb (95.3 kg)   SpO2 95%   BMI 31.93 kg/m²   CONSTITUTIONAL:  awake, alert, cooperative, no apparent distress, and appears stated age  HEAD:  normocepalic, without obvious abnormality, atraumatic  NECK:  Supple, symmetrical, trachea midline, no adenopathy, thyroid symmetric, not enlarged and no tenderness, skin normal  LUNGS:  No increased work of breathing, good air exchange, clear to auscultation bilaterally, no crackles or wheezing  CARDIOVASCULAR:  Normal apical impulse, regular rate and rhythm, normal S1 and S2, no S3 or S4, and no murmur noted, no edema, no JVD, no carotid bruit. ABDOMEN:  Soft, nontender, no masses, no hepatomegaly, no splenomegaly, BS+  MUSCULOSKELETAL:  No clubbing no cyanosis. there is no redness, warmth, or swelling of the joints  full range of motion noted  NEUROLOGIC:  Alert, awake,oriented x3  SKIN:  no bruising or bleeding, normal skin color, texture, turgor and no redness, warmth, or swelling      Cardiographics  I personally reviewed the telemetry monitor strips with the following interpretation:  Echocardiogram: Reviewed, as above. Imaging  Reviewed, as above. Reviewed, as above. Lab Review   Lab Results   Component Value Date     08/25/2021    K 3.5 08/25/2021    K 3.4 08/24/2021     08/25/2021    CO2 29 08/25/2021    BUN 21 08/25/2021    CREATININE 2.1 08/25/2021    GLUCOSE 99 08/25/2021    CALCIUM 9.3 08/25/2021     Lab Results   Component Value Date    WBC 7.9 08/25/2021    HGB 13.7 08/25/2021    HCT 43.1 08/25/2021    MCV 90.2 08/25/2021     08/25/2021     Reviewed, as above. I have personally reviewed the laboratory, cardiac diagnostic and radiographic testing as outlined above:    Assessment:     1. Chest pain: Atypical, negative Lexiscan stress test in May 2021, no further cardiac work-up is planned at this point of time  2. Cardiomyopathy: Nonischemic, s/p AICD in situ  3. Bradycardia: Iatrogenic, will adjust beta-blocker dose  4. Tricuspid valve regurgitation: Mild  5. Hypertension: Controlled  6. Hyperlipidemia: On statin  7.  Stage III chronic kidney disease    Recommendations:     1.  will decrease Coreg to 12.5 mg twice daily due to bradycardia  2. We will continue the rest of medications  3. Basic metabolic panel and CBC in a.m.  4.  Increase ambulation as tolerated    Discussed with patient  Electronically signed by Adis Garcia MD on 9/6/2021 at 6:10 PM  NOTE: This report was transcribed using voice recognition software.  Every effort was made to ensure accuracy; however, inadvertent computerized transcription errors may be present   Late entry note

## 2021-08-26 LAB — URINE CULTURE, ROUTINE: NORMAL

## 2021-08-26 NOTE — DISCHARGE SUMMARY
[facility] Physician Discharge Summary       DO Luda Kent 66  [facility] [id number]    Call in 1 day        Activity level: as tolerated    Diet: No diet orders on file    Labs: None    Condition at discharge: good    Dispo:home      Patient ID:  Princess Hidalgo  [id number]  13 y.o.  1975    Admit date: 8/24/2021    Discharge date and time:  8/26/2021  10:56 AM    Admission Diagnoses: Active Problems:    Chest pain    JOLENE on CPAP    Chronic heart failure with preserved ejection fraction (HCC)  Resolved Problems:    * No resolved hospital problems. *      Discharge Diagnoses: Active Problems:    Chest pain    JOLENE on CPAP    Chronic heart failure with preserved ejection fraction (HCC)  Resolved Problems:    * No resolved hospital problems. *      Consults:  IP CONSULT TO CARDIOLOGY    Procedures: None    Hospital Course: The patient was admitted for chest pain workup. He was having chest pain associated with SOB, nausea and diaphoresis. He has had a negative cardiac cath a few years prior and had a negative stress test 5/2021. Troponins were elevated and had an elevated delta. EKG did not show any signs of acute ischemia. Cardiology was consulted and trended  Troponins. They stated that in light of recent negative stress test, there is no need to repeat it. They considered coronary CTA, however with his CKD they did not want to. The patient remained hemodynamically stable and did not have any further episodes of chest pain throughout admission. Cardiology cleared him for discharge the day following admission.  They changed his coreg dose to 25mg BID, other than that he should resume his home medications and follow up with his PCP and cardiologist.     Discharge Exam:  Vitals:    08/25/21 0627 08/25/21 0930 08/25/21 1520 08/25/21 1522   BP: 103/62 106/67 110/71 110/71   Pulse: 56 55 (!) 49    Resp: 18 18 18    Temp: 98.6 °F (37 °C) 97.8 °F (36.6 °C) 97.8 °F (36.6 °C)    TempSrc: Oral Oral Oral    SpO2: 95%      Weight:       Height:           General Appearance: alert and oriented to person, place and time and in no acute distress  Skin: warm and dry  Head: normocephalic and atraumatic  Eyes: pupils equal, round, and reactive to light, extraocular eye movements intact, conjunctivae normal  Neck: neck supple and non tender without mass   Pulmonary/Chest: clear to auscultation bilaterally- no wheezes, rales or rhonchi, normal air movement, no respiratory distress  Cardiovascular: normal rate, normal S1 and S2 and no carotid bruits  Abdomen: soft, non-tender, non-distended, normal bowel sounds, no masses or organomegaly  Extremities: no cyanosis, no clubbing and no edema  Neurologic: no cranial nerve deficit and speech normal    No intake/output data recorded. No intake/output data recorded. LABS:  Recent Labs     08/24/21  1223 08/25/21  1103    142   K 3.4* 3.5    105   CO2 28 29   BUN 21* 21*   CREATININE 2.3* 2.1*   GLUCOSE 131* 99   CALCIUM 9.2 9.3       Recent Labs     08/24/21  1223 08/25/21  0531 08/25/21  0652   WBC 6.1 7.5 7.9   RBC 4.01 4.77 4.78   HGB 11.6* 13.9 13.7   HCT 36.5* 42.9 43.1   MCV 91.0 89.9 90.2   MCH 28.9 29.1 28.7   MCHC 31.8* 32.4 31.8*   RDW 13.6 13.4 13.7    263 251   MPV 10.6 10.9 10.6       No results for input(s): POCGLU in the last 72 hours.     Component Value Ref Range & Units Status Collected Lab   Troponin, High Sensitivity 39High   0 - 11 ng/L Final 08/24/2021  1:40 PM [address] [place] [address] Lab     Component Value Ref Range & Units Status Collected Lab   Troponin, High Sensitivity 59High   0 - 11 ng/L Final 08/24/2021  4:50 PM [facility] Lab     Component Value Ref Range & Units Status Collected Lab   Troponin, High Sensitivity 35High   0 - 11 ng/L Final 08/25/2021  1:57 PM [address] [place] [address] Lab     Component Value Ref Range & Units Status Collected Lab   CK-MB 2.8 0.0 - 7.7 ng/mL Final 08/25/2021  1:57 PM [address] [facility] Lab         Imaging:  XR CHEST (2 VW)    Result Date: 8/24/2021  EXAMINATION: TWO XRAY VIEWS OF THE CHEST 8/24/2021 1:14 pm COMPARISON: 10/11/2017 HISTORY: ORDERING SYSTEM PROVIDED HISTORY: chest pain TECHNOLOGIST PROVIDED HISTORY: Reason for exam:->chest pain FINDINGS: The lungs are without acute focal process. There is no effusion or pneumothorax. The cardiomediastinal silhouette is without acute process. The osseous structures are without acute process. No acute process.          Patient Instructions:   Discharge Medication List as of 8/25/2021  5:51 PM      CONTINUE these medications which have CHANGED    Details   carvedilol (COREG) 25 MG tablet Take 1 tablet by mouth 2 times daily, Disp-180 tablet, R-3NO PRINT         CONTINUE these medications which have NOT CHANGED    Details   CORLANOR 5 MG TABS tablet TAKE ONE TABLET BY MOUTH TWICE A DAY WITH MEALS, Disp-180 tablet, R-1Normal      hydrALAZINE (APRESOLINE) 25 MG tablet TAKE THREE (3) TABLETS BY MOUTH THREE TIMES DAILY, Disp-270 tablet, R-3Normal      ASPIRIN LOW DOSE 81 MG chewable tablet CHEW AND SWALLOW  ONE TABLET BY MOUTH EVERY DAY, Disp-90 tablet, R-3Normal      CPAP Machine MISC Historical Mednightly       furosemide (LASIX) 40 MG tablet TAKE ONE TABLET BY MOUTH EVERY DAY, Disp-90 tablet, R-3Normal      famotidine (PEPCID) 40 MG tablet Take 1 tablet by mouth every evening, Disp-90 tablet, R-1Normal      spironolactone (ALDACTONE) 25 MG tablet Take 0.5 tablets by mouth daily, Disp-30 tablet, R-8Normal      isosorbide mononitrate (IMDUR) 120 MG extended release tablet TAKE ONE TABLET BY MOUTH EVERY DAY, Disp-90 tablet, R-3Normal      albuterol sulfate HFA (PROVENTIL HFA) 108 (90 BASE) MCG/ACT inhaler Inhale 2 puffs into the lungs every 6 hours as needed for Wheezing, Disp-1 Inhaler, R-0Print               Note that more than 30 minutes was spent in preparing discharge papers, discussing discharge with patient, medication review, etc.    NOTE: This report was transcribed using voice recognition software. Every effort was made to ensure accuracy; however, inadvertent computerized transcription errors may be present.      Signed:  Electronically signed by Markell Aldridge MD on 8/26/2021 at 10:56 AM

## 2021-09-02 ENCOUNTER — OFFICE VISIT (OUTPATIENT)
Dept: FAMILY MEDICINE CLINIC | Age: 46
End: 2021-09-02
Payer: MEDICAID

## 2021-09-02 VITALS
OXYGEN SATURATION: 98 % | DIASTOLIC BLOOD PRESSURE: 95 MMHG | SYSTOLIC BLOOD PRESSURE: 149 MMHG | HEIGHT: 68 IN | HEART RATE: 56 BPM | BODY MASS INDEX: 32.95 KG/M2 | TEMPERATURE: 96.3 F | RESPIRATION RATE: 16 BRPM | WEIGHT: 217.4 LBS

## 2021-09-02 DIAGNOSIS — N18.32 STAGE 3B CHRONIC KIDNEY DISEASE (HCC): ICD-10-CM

## 2021-09-02 DIAGNOSIS — I10 ESSENTIAL HYPERTENSION: Primary | ICD-10-CM

## 2021-09-02 DIAGNOSIS — I25.10 CORONARY ARTERY DISEASE INVOLVING NATIVE CORONARY ARTERY OF NATIVE HEART WITHOUT ANGINA PECTORIS: ICD-10-CM

## 2021-09-02 PROCEDURE — G8417 CALC BMI ABV UP PARAM F/U: HCPCS | Performed by: FAMILY MEDICINE

## 2021-09-02 PROCEDURE — G8427 DOCREV CUR MEDS BY ELIG CLIN: HCPCS | Performed by: FAMILY MEDICINE

## 2021-09-02 PROCEDURE — 1036F TOBACCO NON-USER: CPT | Performed by: FAMILY MEDICINE

## 2021-09-02 PROCEDURE — 99214 OFFICE O/P EST MOD 30 MIN: CPT | Performed by: FAMILY MEDICINE

## 2021-09-02 PROCEDURE — 1111F DSCHRG MED/CURRENT MED MERGE: CPT | Performed by: FAMILY MEDICINE

## 2021-09-02 NOTE — PROGRESS NOTES
2021    Tiara Giron    Chief Complaint   Patient presents with    Follow-Up from Hospital     pt still no feeling well after his ER visit coughing, tried, throat hurting , Pt states ER Dr said take Coreg  oce daily 15mg dont see info on it in discharge notes        HPI  History was obtained from patient. Asha Newell is a 39 y.o. male who presents today with the followin. Essential hypertension    2. Coronary artery disease involving native coronary artery of native heart without angina pectoris    3. Stage 3b chronic kidney disease ([address]       Patient was in the hospital on 2021 for about 29 hours with chest pain. He was treated conservatively and sent home. He seems to be a little confused about his dose of Coreg. I did review the discharge summary that states he should be on 25 mg twice a day. Patient states he has had no more of the chest pain but still feels a little tired. States it has been about a year and a half since he has seen his nephrologist.  REVIEW OF SYMPTOMS    Review of Systems   Constitutional: Positive for fatigue. Negative for chills and fever. HENT: Negative for congestion and sinus pain. Eyes: Negative for photophobia, discharge and redness. Respiratory: Negative for cough, shortness of breath and wheezing. Cardiovascular: Negative for chest pain, palpitations and leg swelling. Gastrointestinal: Negative. Genitourinary: Negative for difficulty urinating, dysuria, hematuria and urgency. Neurological: Negative for headaches. Psychiatric/Behavioral: Negative for sleep disturbance.        PAST MEDICAL HISTORY  Past Medical History:   Diagnosis Date    AICD (automatic cardioverter/defibrillator) present     Asthma     CAD (coronary artery disease)     Cardiac LV ejection fraction 10-20%     CHF (congestive heart failure) (HCC)     CKD (chronic kidney disease)     COPD [facility]     Depression     Diverticulitis     GERD (gastroesophageal reflux disease)     Hyperlipidemia     Hypertension     Nonischemic cardiomyopathy (HCC)     Panic attacks     Sleep apnea     uses Cpap nightly       FAMILY HISTORY  Family History   Problem Relation Age of Onset    Cancer Father     No Known Problems Mother     Other Sister         Heart murmur       SOCIAL HISTORY  Social History     Socioeconomic History    Marital status: Single     Spouse name: None    Number of children: None    Years of education: None    Highest education level: None   Occupational History    None   Tobacco Use    Smoking status: Never Smoker    Smokeless tobacco: Never Used    Tobacco comment: Patient counseled to remain smoke free   Vaping Use    Vaping Use: Never used   Substance and Sexual Activity    Alcohol use: Yes     Comment: 2-3 times monthly. coffee rarely     Drug use: Yes     Types: Marijuana     Comment: Occasionallu    Sexual activity: Yes     Partners: Female     Comment:  for 7 years   Other Topics Concern    None   Social History Narrative    None     Social Determinants of Health     Financial Resource Strain: Low Risk     Difficulty of Paying Living Expenses: Not hard at all   Food Insecurity: No Food Insecurity    Worried About Running Out of Food in the Last Year: Never true    [address] in the Last Year: Never true   Transportation Needs: No Transportation Needs    Lack of Transportation (Medical): No    Lack of Transportation (Non-Medical):  No   Physical Activity:     Days of Exercise per Week:     Minutes of Exercise per Session:    Stress:     Feeling of Stress :    Social Connections:     Frequency of Communication with Friends and Family:     Frequency of Social Gatherings with Friends and Family:     Attends [employer] Services:     Active Member of Clubs or Organizations:     Attends Club or Organization Meetings:     Marital Status:    Intimate Partner Violence:     Fear of Current or Ex-Partner:     Emotionally Abused:     Physically Abused:     Sexually Abused:         SURGICAL HISTORY  Past Surgical History:   Procedure Laterality Date    CARDIAC DEFIBRILLATOR PLACEMENT Left 01/27/2017    [employer] single lead ICD,     DIAGNOSTIC CARDIAC CATH LAB PROCEDURE  2015    [facility]     [place] COLONOSCOPY FLX DX W/COLLJ SPEC WHEN PFRMD N/A 9/6/2018    COLONOSCOPY performed by Marylene Setting, MD at [place] [address]  Current Outpatient Medications   Medication Sig Dispense Refill    carvedilol (COREG) 25 MG tablet Take 1 tablet by mouth 2 times daily 180 tablet 3    CORLANOR 5 MG TABS tablet TAKE ONE TABLET BY MOUTH TWICE A DAY WITH MEALS 180 tablet 1    hydrALAZINE (APRESOLINE) 25 MG tablet TAKE THREE (3) TABLETS BY MOUTH THREE TIMES DAILY 270 tablet 3    ASPIRIN LOW DOSE 81 MG chewable tablet CHEW AND SWALLOW  ONE TABLET BY MOUTH EVERY DAY 90 tablet 3    CPAP Machine MISC by Does not apply route nightly      furosemide (LASIX) 40 MG tablet TAKE ONE TABLET BY MOUTH EVERY DAY 90 tablet 3    famotidine (PEPCID) 40 MG tablet Take 1 tablet by mouth every evening 90 tablet 1    spironolactone (ALDACTONE) 25 MG tablet Take 0.5 tablets by mouth daily 30 tablet 8    isosorbide mononitrate (IMDUR) 120 MG extended release tablet TAKE ONE TABLET BY MOUTH EVERY DAY 90 tablet 3    albuterol sulfate HFA (PROVENTIL HFA) 108 (90 BASE) MCG/ACT inhaler Inhale 2 puffs into the lungs every 6 hours as needed for Wheezing 1 Inhaler 0     No current facility-administered medications for this visit. ALLERGIES  No Known Allergies    PHYSICAL EXAM    BP (!) 149/95 (Site: Right Lower Arm, Position: Sitting, Cuff Size: Medium Adult)   Pulse 56   Temp 96.3 °F (35.7 °C) (Temporal)   Resp 16   Ht 5' 8\" (1.727 m)   Wt 217 lb 6.4 oz (98.6 kg)   SpO2 98%   BMI 33.06 kg/m²     Physical Exam  Vitals and nursing note reviewed.    Constitutional:       Appearance: Normal appearance. HENT:      Nose: No congestion or rhinorrhea. Mouth/Throat:      Mouth: Mucous membranes are moist.      Pharynx: Oropharynx is clear. Eyes:      Conjunctiva/sclera: Conjunctivae normal.   Cardiovascular:      Rate and Rhythm: Normal rate and regular rhythm. Heart sounds: Normal heart sounds. Pulmonary:      Effort: Pulmonary effort is normal.      Breath sounds: Normal breath sounds. Abdominal:      Palpations: Abdomen is soft. Musculoskeletal:      Cervical back: Neck supple. Skin:     General: Skin is warm. Neurological:      Mental Status: He is alert and oriented to person, place, and time. Psychiatric:         Mood and Affect: Mood normal.         ASSESSMENT & PLAN    Barb Gant was seen today for follow-up from hospital.    Diagnoses and all orders for this visit:    Essential hypertension    Coronary artery disease involving native coronary artery of native heart without angina pectoris    Stage 3b chronic kidney disease ([address]      Patient is reminded to take his Coreg 25 mg twice a day along with all his other medications. He is to call his nephrologist today and make a follow-up appointment with him as his blood pressure is elevated today. He is to follow-up with his cardiologist per his instructions. Return in about 3 months (around 12/2/2021). Electronically signed by Princess Willams.  Deanna Lam DO on 9/2/2021

## 2021-09-03 ASSESSMENT — ENCOUNTER SYMPTOMS
EYE REDNESS: 0
COUGH: 0
SINUS PAIN: 0
PHOTOPHOBIA: 0
EYE DISCHARGE: 0
GASTROINTESTINAL NEGATIVE: 1
SHORTNESS OF BREATH: 0
WHEEZING: 0

## 2021-09-13 ENCOUNTER — HOSPITAL ENCOUNTER (EMERGENCY)
Age: 46
Discharge: HOME | End: 2021-09-13
Attending: EMERGENCY MEDICINE
Payer: MEDICAID

## 2021-09-13 VITALS
OXYGEN SATURATION: 95 % | HEART RATE: 82 BPM | RESPIRATION RATE: 20 BRPM | DIASTOLIC BLOOD PRESSURE: 70 MMHG | SYSTOLIC BLOOD PRESSURE: 119 MMHG | WEIGHT: 212 LBS | TEMPERATURE: 97.2 F | BODY MASS INDEX: 32.23 KG/M2

## 2021-09-13 DIAGNOSIS — J06.9 UPPER RESPIRATORY TRACT INFECTION, UNSPECIFIED TYPE: Primary | ICD-10-CM

## 2021-09-13 LAB — SARS-COV-2, NAAT: NOT DETECTED

## 2021-09-13 PROCEDURE — 87635 SARS-COV-2 COVID-19 AMP PRB: CPT

## 2021-09-13 PROCEDURE — 99283 EMERGENCY DEPT VISIT LOW MDM: CPT

## 2021-09-13 RX ORDER — DEXTROMETHORPHAN HYDROBROMIDE AND PROMETHAZINE HYDROCHLORIDE 15; 6.25 MG/5ML; MG/5ML
5 SYRUP ORAL 4 TIMES DAILY PRN
Qty: 118 ML | Refills: 0 | Status: SHIPPED | OUTPATIENT
Start: 2021-09-13 | End: 2021-09-20

## 2021-09-13 ASSESSMENT — ENCOUNTER SYMPTOMS
COUGH: 1
BACK PAIN: 0
ABDOMINAL PAIN: 0
SHORTNESS OF BREATH: 0
EYE DISCHARGE: 0
WHEEZING: 0
SORE THROAT: 0
SINUS PRESSURE: 0
DIARRHEA: 0
VOMITING: 0
NAUSEA: 0
EYE PAIN: 0
EYE REDNESS: 0

## 2021-09-13 NOTE — ED PROVIDER NOTES
The history is provided by the patient. Illness   The current episode started 5 to 7 days ago. The onset was gradual. The problem occurs occasionally. The problem has been unchanged. The problem is mild. Nothing relieves the symptoms. Nothing aggravates the symptoms. Associated symptoms include congestion, cough and URI. Pertinent negatives include no fever, no abdominal pain, no diarrhea, no nausea, no vomiting, no ear pain, no headaches, no sore throat, no wheezing, no rash, no eye discharge, no eye pain and no eye redness. Review of Systems   Constitutional: Positive for activity change. Negative for chills and fever. HENT: Positive for congestion. Negative for ear pain, sinus pressure and sore throat. Eyes: Negative for pain, discharge and redness. Respiratory: Positive for cough. Negative for shortness of breath and wheezing. Cardiovascular: Negative for chest pain. Gastrointestinal: Negative for abdominal pain, diarrhea, nausea and vomiting. Genitourinary: Negative for dysuria and frequency. Musculoskeletal: Negative for arthralgias and back pain. Skin: Negative for rash and wound. Neurological: Negative for weakness and headaches. Hematological: Negative for adenopathy. Psychiatric/Behavioral: Negative. All other systems reviewed and are negative. Physical Exam  Vitals and nursing note reviewed. Constitutional:       Appearance: He is well-developed. HENT:      Head: Normocephalic and atraumatic. Right Ear: Tympanic membrane normal.      Left Ear: Tympanic membrane normal.      Nose: Congestion and rhinorrhea present. Eyes:      Pupils: Pupils are equal, round, and reactive to light. Cardiovascular:      Rate and Rhythm: Normal rate and regular rhythm. Heart sounds: Normal heart sounds. No murmur heard. Pulmonary:      Effort: Pulmonary effort is normal.      Breath sounds: Normal breath sounds.    Abdominal:      General: Bowel sounds are normal.      Palpations: Abdomen is soft. Tenderness: There is no abdominal tenderness. There is no guarding or rebound. Musculoskeletal:      Cervical back: Normal range of motion and neck supple. Skin:     General: Skin is warm and dry. Neurological:      Mental Status: He is alert and oriented to person, place, and time. Psychiatric:         Behavior: Behavior normal.         Thought Content: Thought content normal.         Judgment: Judgment normal.        --------------------------------------------- PAST HISTORY ---------------------------------------------  Past Medical History:  has a past medical history of AICD (automatic cardioverter/defibrillator) present, Asthma, CAD (coronary artery disease), Cardiac LV ejection fraction 10-20%, CHF (congestive heart failure) ([facility] [address] CKD (chronic kidney disease), COPD (chronic obstructive pulmonary disease) ([facility] [address] Depression, Diverticulitis, GERD (gastroesophageal reflux disease), Hyperlipidemia, Hypertension, Nonischemic cardiomyopathy ([facility] [address] Panic attacks, and Sleep apnea. Past Surgical History:  has a past surgical history that includes Diagnostic Cardiac Cath Lab Procedure (2015); Cardiac defibrillator placement (Left, 01/27/2017); and pr colonoscopy flx dx w/collj spec when pfrmd (N/A, 9/6/2018). Social History:  reports that he has never smoked. He has never used smokeless tobacco. He reports current alcohol use. He reports current drug use. Drug: Marijuana. Family History: family history includes Cancer in his father; No Known Problems in his mother; Other in his sister. The patients home medications have been reviewed. Allergies: Patient has no known allergies.     -------------------------------------------------- RESULTS -------------------------------------------------  Results for orders placed or performed during the hospital encounter of 09/13/21   COVID-19, Rapid    Specimen: Nares   Result Value Ref Range    SARS-CoV-2, NAAT Not Detected Not Detected     No orders to display       ------------------------- NURSING NOTES AND VITALS REVIEWED ---------------------------   The nursing notes within the ED encounter and vital signs as below have been reviewed. /70   Pulse 82   Temp 97.2 °F (36.2 °C) (Axillary)   Resp 20   Wt 212 lb (96.2 kg)   SpO2 95%   BMI 32.23 kg/m²   Oxygen Saturation Interpretation: Normal      ------------------------------------------ PROGRESS NOTES ------------------------------------------   I have spoken with the patient and discussed todays results, in addition to providing specific details for the plan of care and counseling regarding the diagnosis and prognosis. Their questions are answered at this time and they are agreeable with the plan.      --------------------------------- ADDITIONAL PROVIDER NOTES ---------------------------------        This patient is stable for discharge. I have shared the specific conditions for return, as well as the importance of follow-up. IMPRESSION:     1.  Upper respiratory tract infection, unspecified type      Patient's Medications   New Prescriptions    PROMETHAZINE-DEXTROMETHORPHAN (PROMETHAZINE-DM) 6.25-15 MG/5ML SYRUP    Take 5 mLs by mouth 4 times daily as needed for Cough   Previous Medications    ALBUTEROL SULFATE HFA (PROVENTIL HFA) 108 (90 BASE) MCG/ACT INHALER    Inhale 2 puffs into the lungs every 6 hours as needed for Wheezing    ASPIRIN LOW DOSE 81 MG CHEWABLE TABLET    CHEW AND SWALLOW  ONE TABLET BY MOUTH EVERY DAY    CARVEDILOL (COREG) 25 MG TABLET    Take 1 tablet by mouth 2 times daily    CORLANOR 5 MG TABS TABLET    TAKE ONE TABLET BY MOUTH TWICE A DAY WITH MEALS    CPAP MACHINE MISC    by Does not apply route nightly    FAMOTIDINE (PEPCID) 40 MG TABLET    Take 1 tablet by mouth every evening    FUROSEMIDE (LASIX) 40 MG TABLET    TAKE ONE TABLET BY MOUTH EVERY DAY    HYDRALAZINE (APRESOLINE) 25 MG TABLET    TAKE THREE (3) TABLETS BY

## 2021-09-13 NOTE — LETTER
[address] [place] [address] Emergency Department  [address] [address]  Phone: [phone]               September 13, 2021    Patient: Lizabeth Goodwin   YOB: 1975   Date of Visit: 9/13/2021       To Whom It May Concern:    Mona Jamison was seen and treated in our emergency department on 9/13/2021. He may return to work on 9/15/21.       Sincerely,       Andrzej Lebron DO         Signature:__________________________________

## 2021-10-06 ENCOUNTER — APPOINTMENT (OUTPATIENT)
Dept: GENERAL RADIOLOGY | Age: 46
DRG: 137 | End: 2021-10-06
Payer: MEDICAID

## 2021-10-06 ENCOUNTER — APPOINTMENT (OUTPATIENT)
Dept: CT IMAGING | Age: 46
DRG: 137 | End: 2021-10-06
Payer: MEDICAID

## 2021-10-06 ENCOUNTER — HOSPITAL ENCOUNTER (INPATIENT)
Age: 46
LOS: 4 days | Discharge: HOME | DRG: 137 | End: 2021-10-10
Attending: EMERGENCY MEDICINE | Admitting: INTERNAL MEDICINE
Payer: MEDICAID

## 2021-10-06 DIAGNOSIS — R55 SYNCOPE AND COLLAPSE: ICD-10-CM

## 2021-10-06 DIAGNOSIS — U07.1 COVID: ICD-10-CM

## 2021-10-06 DIAGNOSIS — N17.9 AKI (ACUTE KIDNEY INJURY) (HCC): Primary | ICD-10-CM

## 2021-10-06 LAB
ALBUMIN SERPL-MCNC: 4 G/DL (ref 3.5–5.2)
ALP BLD-CCNC: 69 U/L (ref 40–129)
ALT SERPL-CCNC: 19 U/L (ref 0–40)
ANION GAP SERPL CALCULATED.3IONS-SCNC: 12 MMOL/L (ref 7–16)
AST SERPL-CCNC: 25 U/L (ref 0–39)
BACTERIA: NORMAL /HPF
BASOPHILS ABSOLUTE: 0.02 E9/L (ref 0–0.2)
BASOPHILS RELATIVE PERCENT: 0.3 % (ref 0–2)
BILIRUB SERPL-MCNC: 0.5 MG/DL (ref 0–1.2)
BILIRUBIN URINE: NEGATIVE
BLOOD, URINE: NEGATIVE
BUN BLDV-MCNC: 21 MG/DL (ref 6–20)
CALCIUM SERPL-MCNC: 9.4 MG/DL (ref 8.6–10.2)
CHLORIDE BLD-SCNC: 100 MMOL/L (ref 98–107)
CLARITY: CLEAR
CO2: 27 MMOL/L (ref 22–29)
COLOR: YELLOW
CREAT SERPL-MCNC: 2.6 MG/DL (ref 0.7–1.2)
EOSINOPHILS ABSOLUTE: 0.05 E9/L (ref 0.05–0.5)
EOSINOPHILS RELATIVE PERCENT: 0.7 % (ref 0–6)
GFR AFRICAN AMERICAN: 32
GFR NON-AFRICAN AMERICAN: 32 ML/MIN/1.73
GLUCOSE BLD-MCNC: 109 MG/DL (ref 74–99)
GLUCOSE URINE: NEGATIVE MG/DL
HCT VFR BLD CALC: 46.6 % (ref 37–54)
HEMOGLOBIN: 14.7 G/DL (ref 12.5–16.5)
IMMATURE GRANULOCYTES #: 0.03 E9/L
IMMATURE GRANULOCYTES %: 0.4 % (ref 0–5)
KETONES, URINE: NEGATIVE MG/DL
LEUKOCYTE ESTERASE, URINE: NEGATIVE
LYMPHOCYTES ABSOLUTE: 1.88 E9/L (ref 1.5–4)
LYMPHOCYTES RELATIVE PERCENT: 25.2 % (ref 20–42)
MCH RBC QN AUTO: 28.4 PG (ref 26–35)
MCHC RBC AUTO-ENTMCNC: 31.5 % (ref 32–34.5)
MCV RBC AUTO: 90.1 FL (ref 80–99.9)
MONOCYTES ABSOLUTE: 0.67 E9/L (ref 0.1–0.95)
MONOCYTES RELATIVE PERCENT: 9 % (ref 2–12)
NEUTROPHILS ABSOLUTE: 4.81 E9/L (ref 1.8–7.3)
NEUTROPHILS RELATIVE PERCENT: 64.4 % (ref 43–80)
NITRITE, URINE: NEGATIVE
PDW BLD-RTO: 14.2 FL (ref 11.5–15)
PH UA: 6 (ref 5–9)
PLATELET # BLD: 211 E9/L (ref 130–450)
PMV BLD AUTO: 10.8 FL (ref 7–12)
POTASSIUM REFLEX MAGNESIUM: 3.9 MMOL/L (ref 3.5–5)
PROTEIN UA: 100 MG/DL
RBC # BLD: 5.17 E12/L (ref 3.8–5.8)
RBC UA: NORMAL /HPF (ref 0–2)
SARS-COV-2, NAAT: DETECTED
SODIUM BLD-SCNC: 139 MMOL/L (ref 132–146)
SPECIFIC GRAVITY UA: 1.02 (ref 1–1.03)
TOTAL PROTEIN: 7.7 G/DL (ref 6.4–8.3)
UROBILINOGEN, URINE: 0.2 E.U./DL
WBC # BLD: 7.5 E9/L (ref 4.5–11.5)
WBC UA: NORMAL /HPF (ref 0–5)

## 2021-10-06 PROCEDURE — 6370000000 HC RX 637 (ALT 250 FOR IP): Performed by: PHYSICIAN ASSISTANT

## 2021-10-06 PROCEDURE — 87635 SARS-COV-2 COVID-19 AMP PRB: CPT

## 2021-10-06 PROCEDURE — 99283 EMERGENCY DEPT VISIT LOW MDM: CPT

## 2021-10-06 PROCEDURE — 2580000003 HC RX 258: Performed by: EMERGENCY MEDICINE

## 2021-10-06 PROCEDURE — 81001 URINALYSIS AUTO W/SCOPE: CPT

## 2021-10-06 PROCEDURE — 80053 COMPREHEN METABOLIC PANEL: CPT

## 2021-10-06 PROCEDURE — XW033E5 INTRODUCTION OF REMDESIVIR ANTI-INFECTIVE INTO PERIPHERAL VEIN, PERCUTANEOUS APPROACH, NEW TECHNOLOGY GROUP 5: ICD-10-PCS | Performed by: INTERNAL MEDICINE

## 2021-10-06 PROCEDURE — 85025 COMPLETE CBC W/AUTO DIFF WBC: CPT

## 2021-10-06 PROCEDURE — 2060000000 HC ICU INTERMEDIATE R&B

## 2021-10-06 PROCEDURE — 93005 ELECTROCARDIOGRAM TRACING: CPT | Performed by: PHYSICIAN ASSISTANT

## 2021-10-06 PROCEDURE — 99223 1ST HOSP IP/OBS HIGH 75: CPT | Performed by: INTERNAL MEDICINE

## 2021-10-06 PROCEDURE — 70450 CT HEAD/BRAIN W/O DYE: CPT

## 2021-10-06 PROCEDURE — 71046 X-RAY EXAM CHEST 2 VIEWS: CPT

## 2021-10-06 PROCEDURE — 72125 CT NECK SPINE W/O DYE: CPT

## 2021-10-06 RX ORDER — ACETAMINOPHEN 325 MG/1
650 TABLET ORAL ONCE
Status: COMPLETED | OUTPATIENT
Start: 2021-10-06 | End: 2021-10-06

## 2021-10-06 RX ORDER — 0.9 % SODIUM CHLORIDE 0.9 %
500 INTRAVENOUS SOLUTION INTRAVENOUS ONCE
Status: COMPLETED | OUTPATIENT
Start: 2021-10-06 | End: 2021-10-06

## 2021-10-06 RX ADMIN — ACETAMINOPHEN 650 MG: 325 TABLET ORAL at 20:46

## 2021-10-06 RX ADMIN — SODIUM CHLORIDE 500 ML: 9 INJECTION, SOLUTION INTRAVENOUS at 20:46

## 2021-10-06 ASSESSMENT — PAIN SCALES - GENERAL
PAINLEVEL_OUTOF10: 0
PAINLEVEL_OUTOF10: 5

## 2021-10-06 ASSESSMENT — ENCOUNTER SYMPTOMS
ABDOMINAL PAIN: 0
SHORTNESS OF BREATH: 0

## 2021-10-06 NOTE — ED TRIAGE NOTES
FIRST PROVIDER CONTACT ASSESSMENT NOTE      Department of Emergency Medicine   Admit Date: No admission date for patient encounter. Chief Complaint: Loss of Consciousness (dizziness)      History of Present Illness:    Zita Dunn is a 39 y.o. male who presents to the ED for syncope. Sick for 3 days. Body aches. Lightheaded  Diarrhea. Passed out today from toilet.     Has had 1 Moderna vaccination.        -----------------END OF FIRST PROVIDER CONTACT ASSESSMENT NOTE--------------  Electronically signed by LIBORIO Yanes   DD: 10/6/21

## 2021-10-07 LAB
ANION GAP SERPL CALCULATED.3IONS-SCNC: 10 MMOL/L (ref 7–16)
BASOPHILS ABSOLUTE: 0.03 E9/L (ref 0–0.2)
BASOPHILS RELATIVE PERCENT: 0.4 % (ref 0–2)
BUN BLDV-MCNC: 23 MG/DL (ref 6–20)
CALCIUM SERPL-MCNC: 9 MG/DL (ref 8.6–10.2)
CHLORIDE BLD-SCNC: 102 MMOL/L (ref 98–107)
CO2: 25 MMOL/L (ref 22–29)
CREAT SERPL-MCNC: 2.3 MG/DL (ref 0.7–1.2)
D DIMER: 242 NG/ML DDU
EKG ATRIAL RATE: 78 BPM
EKG P AXIS: 38 DEGREES
EKG P-R INTERVAL: 136 MS
EKG Q-T INTERVAL: 368 MS
EKG QRS DURATION: 96 MS
EKG QTC CALCULATION (BAZETT): 419 MS
EKG R AXIS: 92 DEGREES
EKG T AXIS: 41 DEGREES
EKG VENTRICULAR RATE: 78 BPM
EOSINOPHILS ABSOLUTE: 0.02 E9/L (ref 0.05–0.5)
EOSINOPHILS RELATIVE PERCENT: 0.3 % (ref 0–6)
FIBRINOGEN: >700 MG/DL (ref 225–540)
GFR AFRICAN AMERICAN: 37
GFR NON-AFRICAN AMERICAN: 37 ML/MIN/1.73
GLUCOSE BLD-MCNC: 105 MG/DL (ref 74–99)
HCT VFR BLD CALC: 46.7 % (ref 37–54)
HEMOGLOBIN: 14.8 G/DL (ref 12.5–16.5)
IMMATURE GRANULOCYTES #: 0.02 E9/L
IMMATURE GRANULOCYTES %: 0.3 % (ref 0–5)
LYMPHOCYTES ABSOLUTE: 1.81 E9/L (ref 1.5–4)
LYMPHOCYTES RELATIVE PERCENT: 26.3 % (ref 20–42)
MCH RBC QN AUTO: 28.2 PG (ref 26–35)
MCHC RBC AUTO-ENTMCNC: 31.7 % (ref 32–34.5)
MCV RBC AUTO: 89.1 FL (ref 80–99.9)
MONOCYTES ABSOLUTE: 0.67 E9/L (ref 0.1–0.95)
MONOCYTES RELATIVE PERCENT: 9.7 % (ref 2–12)
NEUTROPHILS ABSOLUTE: 4.33 E9/L (ref 1.8–7.3)
NEUTROPHILS RELATIVE PERCENT: 63 % (ref 43–80)
PDW BLD-RTO: 14 FL (ref 11.5–15)
PLATELET # BLD: 191 E9/L (ref 130–450)
PMV BLD AUTO: 10.5 FL (ref 7–12)
POTASSIUM REFLEX MAGNESIUM: 3.8 MMOL/L (ref 3.5–5)
PROCALCITONIN: 0.32 NG/ML (ref 0–0.08)
RBC # BLD: 5.24 E12/L (ref 3.8–5.8)
SODIUM BLD-SCNC: 137 MMOL/L (ref 132–146)
WBC # BLD: 6.9 E9/L (ref 4.5–11.5)

## 2021-10-07 PROCEDURE — 84145 PROCALCITONIN (PCT): CPT

## 2021-10-07 PROCEDURE — 2060000000 HC ICU INTERMEDIATE R&B

## 2021-10-07 PROCEDURE — 85378 FIBRIN DEGRADE SEMIQUANT: CPT

## 2021-10-07 PROCEDURE — 86140 C-REACTIVE PROTEIN: CPT

## 2021-10-07 PROCEDURE — 6360000002 HC RX W HCPCS: Performed by: INTERNAL MEDICINE

## 2021-10-07 PROCEDURE — APPSS60 APP SPLIT SHARED TIME 46-60 MINUTES: Performed by: PHYSICIAN ASSISTANT

## 2021-10-07 PROCEDURE — 99254 IP/OBS CNSLTJ NEW/EST MOD 60: CPT | Performed by: INTERNAL MEDICINE

## 2021-10-07 PROCEDURE — 6370000000 HC RX 637 (ALT 250 FOR IP): Performed by: INTERNAL MEDICINE

## 2021-10-07 PROCEDURE — 82728 ASSAY OF FERRITIN: CPT

## 2021-10-07 PROCEDURE — 2580000003 HC RX 258: Performed by: INTERNAL MEDICINE

## 2021-10-07 PROCEDURE — 2500000003 HC RX 250 WO HCPCS: Performed by: INTERNAL MEDICINE

## 2021-10-07 PROCEDURE — 85384 FIBRINOGEN ACTIVITY: CPT

## 2021-10-07 PROCEDURE — 99232 SBSQ HOSP IP/OBS MODERATE 35: CPT | Performed by: INTERNAL MEDICINE

## 2021-10-07 PROCEDURE — 93010 ELECTROCARDIOGRAM REPORT: CPT | Performed by: INTERNAL MEDICINE

## 2021-10-07 PROCEDURE — 85025 COMPLETE CBC W/AUTO DIFF WBC: CPT

## 2021-10-07 PROCEDURE — 80048 BASIC METABOLIC PNL TOTAL CA: CPT

## 2021-10-07 PROCEDURE — 36415 COLL VENOUS BLD VENIPUNCTURE: CPT

## 2021-10-07 RX ORDER — ACETAMINOPHEN 325 MG/1
650 TABLET ORAL EVERY 6 HOURS PRN
Status: DISCONTINUED | OUTPATIENT
Start: 2021-10-07 | End: 2021-10-10 | Stop reason: HOSPADM

## 2021-10-07 RX ORDER — BENZONATATE 100 MG/1
100 CAPSULE ORAL 3 TIMES DAILY PRN
Status: DISCONTINUED | OUTPATIENT
Start: 2021-10-07 | End: 2021-10-10 | Stop reason: HOSPADM

## 2021-10-07 RX ORDER — ASPIRIN 81 MG/1
81 TABLET, CHEWABLE ORAL DAILY
Status: DISCONTINUED | OUTPATIENT
Start: 2021-10-07 | End: 2021-10-10 | Stop reason: HOSPADM

## 2021-10-07 RX ORDER — SODIUM CHLORIDE 9 MG/ML
INJECTION, SOLUTION INTRAVENOUS CONTINUOUS
Status: DISCONTINUED | OUTPATIENT
Start: 2021-10-07 | End: 2021-10-07

## 2021-10-07 RX ORDER — ISOSORBIDE MONONITRATE 60 MG/1
120 TABLET, EXTENDED RELEASE ORAL DAILY
Status: DISCONTINUED | OUTPATIENT
Start: 2021-10-07 | End: 2021-10-10 | Stop reason: HOSPADM

## 2021-10-07 RX ORDER — DEXAMETHASONE SODIUM PHOSPHATE 10 MG/ML
6 INJECTION INTRAMUSCULAR; INTRAVENOUS EVERY 24 HOURS
Status: DISCONTINUED | OUTPATIENT
Start: 2021-10-07 | End: 2021-10-10 | Stop reason: HOSPADM

## 2021-10-07 RX ORDER — SODIUM CHLORIDE 9 MG/ML
25 INJECTION, SOLUTION INTRAVENOUS PRN
Status: DISCONTINUED | OUTPATIENT
Start: 2021-10-07 | End: 2021-10-10 | Stop reason: HOSPADM

## 2021-10-07 RX ORDER — HYDRALAZINE HYDROCHLORIDE 25 MG/1
75 TABLET, FILM COATED ORAL EVERY 8 HOURS SCHEDULED
Status: DISCONTINUED | OUTPATIENT
Start: 2021-10-07 | End: 2021-10-10 | Stop reason: HOSPADM

## 2021-10-07 RX ORDER — ZINC SULFATE 50(220)MG
50 CAPSULE ORAL 2 TIMES DAILY
Status: DISCONTINUED | OUTPATIENT
Start: 2021-10-07 | End: 2021-10-10 | Stop reason: HOSPADM

## 2021-10-07 RX ORDER — ASCORBIC ACID 500 MG
1000 TABLET ORAL 2 TIMES DAILY
Status: DISCONTINUED | OUTPATIENT
Start: 2021-10-07 | End: 2021-10-10 | Stop reason: HOSPADM

## 2021-10-07 RX ORDER — SODIUM CHLORIDE 0.9 % (FLUSH) 0.9 %
10 SYRINGE (ML) INJECTION PRN
Status: DISCONTINUED | OUTPATIENT
Start: 2021-10-07 | End: 2021-10-10 | Stop reason: HOSPADM

## 2021-10-07 RX ORDER — ALBUTEROL SULFATE 90 UG/1
2 AEROSOL, METERED RESPIRATORY (INHALATION) EVERY 6 HOURS PRN
Status: DISCONTINUED | OUTPATIENT
Start: 2021-10-07 | End: 2021-10-10 | Stop reason: HOSPADM

## 2021-10-07 RX ORDER — CARVEDILOL 25 MG/1
25 TABLET ORAL 2 TIMES DAILY
Status: DISCONTINUED | OUTPATIENT
Start: 2021-10-07 | End: 2021-10-10 | Stop reason: HOSPADM

## 2021-10-07 RX ORDER — FAMOTIDINE 20 MG/1
40 TABLET, FILM COATED ORAL EVERY EVENING
Status: DISCONTINUED | OUTPATIENT
Start: 2021-10-07 | End: 2021-10-10 | Stop reason: HOSPADM

## 2021-10-07 RX ORDER — 0.9 % SODIUM CHLORIDE 0.9 %
30 INTRAVENOUS SOLUTION INTRAVENOUS PRN
Status: DISCONTINUED | OUTPATIENT
Start: 2021-10-07 | End: 2021-10-10 | Stop reason: HOSPADM

## 2021-10-07 RX ORDER — GUAIFENESIN/DEXTROMETHORPHAN 100-10MG/5
5 SYRUP ORAL EVERY 4 HOURS PRN
Status: DISCONTINUED | OUTPATIENT
Start: 2021-10-07 | End: 2021-10-10 | Stop reason: HOSPADM

## 2021-10-07 RX ORDER — POLYETHYLENE GLYCOL 3350 17 G/17G
17 POWDER, FOR SOLUTION ORAL DAILY PRN
Status: DISCONTINUED | OUTPATIENT
Start: 2021-10-07 | End: 2021-10-10 | Stop reason: HOSPADM

## 2021-10-07 RX ORDER — ACETAMINOPHEN 650 MG/1
650 SUPPOSITORY RECTAL EVERY 6 HOURS PRN
Status: DISCONTINUED | OUTPATIENT
Start: 2021-10-07 | End: 2021-10-10 | Stop reason: HOSPADM

## 2021-10-07 RX ORDER — SODIUM CHLORIDE 0.9 % (FLUSH) 0.9 %
10 SYRINGE (ML) INJECTION EVERY 12 HOURS SCHEDULED
Status: DISCONTINUED | OUTPATIENT
Start: 2021-10-07 | End: 2021-10-10 | Stop reason: HOSPADM

## 2021-10-07 RX ORDER — VITAMIN B COMPLEX
1000 TABLET ORAL DAILY
Status: DISCONTINUED | OUTPATIENT
Start: 2021-10-07 | End: 2021-10-10 | Stop reason: HOSPADM

## 2021-10-07 RX ADMIN — IVABRADINE 5 MG: 5 TABLET, FILM COATED ORAL at 09:55

## 2021-10-07 RX ADMIN — CARVEDILOL 25 MG: 25 TABLET, FILM COATED ORAL at 09:55

## 2021-10-07 RX ADMIN — ZINC SULFATE 220 MG (50 MG) CAPSULE 50 MG: CAPSULE at 21:17

## 2021-10-07 RX ADMIN — BENZONATATE 100 MG: 100 CAPSULE ORAL at 03:24

## 2021-10-07 RX ADMIN — ZINC SULFATE 220 MG (50 MG) CAPSULE 50 MG: CAPSULE at 09:55

## 2021-10-07 RX ADMIN — ASPIRIN 81 MG CHEWABLE TABLET 81 MG: 81 TABLET CHEWABLE at 09:54

## 2021-10-07 RX ADMIN — HYDRALAZINE HYDROCHLORIDE 75 MG: 25 TABLET, FILM COATED ORAL at 15:51

## 2021-10-07 RX ADMIN — OXYCODONE HYDROCHLORIDE AND ACETAMINOPHEN 1000 MG: 500 TABLET ORAL at 21:17

## 2021-10-07 RX ADMIN — SODIUM CHLORIDE: 9 INJECTION, SOLUTION INTRAVENOUS at 05:00

## 2021-10-07 RX ADMIN — OXYCODONE HYDROCHLORIDE AND ACETAMINOPHEN 1000 MG: 500 TABLET ORAL at 09:54

## 2021-10-07 RX ADMIN — ISOSORBIDE MONONITRATE 120 MG: 60 TABLET, EXTENDED RELEASE ORAL at 09:55

## 2021-10-07 RX ADMIN — HYDRALAZINE HYDROCHLORIDE 75 MG: 25 TABLET, FILM COATED ORAL at 09:55

## 2021-10-07 RX ADMIN — IVABRADINE 5 MG: 5 TABLET, FILM COATED ORAL at 15:51

## 2021-10-07 RX ADMIN — SODIUM CHLORIDE, PRESERVATIVE FREE 10 ML: 5 INJECTION INTRAVENOUS at 21:18

## 2021-10-07 RX ADMIN — DEXAMETHASONE SODIUM PHOSPHATE 6 MG: 10 INJECTION INTRAMUSCULAR; INTRAVENOUS at 03:23

## 2021-10-07 RX ADMIN — Medication 1000 UNITS: at 09:55

## 2021-10-07 RX ADMIN — CARVEDILOL 25 MG: 25 TABLET, FILM COATED ORAL at 21:17

## 2021-10-07 RX ADMIN — HYDRALAZINE HYDROCHLORIDE 75 MG: 25 TABLET, FILM COATED ORAL at 21:17

## 2021-10-07 RX ADMIN — ENOXAPARIN SODIUM 40 MG: 40 INJECTION SUBCUTANEOUS at 09:54

## 2021-10-07 RX ADMIN — SODIUM CHLORIDE, PRESERVATIVE FREE 10 ML: 5 INJECTION INTRAVENOUS at 09:55

## 2021-10-07 RX ADMIN — REMDESIVIR 200 MG: 100 INJECTION, POWDER, LYOPHILIZED, FOR SOLUTION INTRAVENOUS at 17:50

## 2021-10-07 RX ADMIN — GUAIFENESIN AND DEXTROMETHORPHAN 5 ML: 100; 10 SYRUP ORAL at 17:50

## 2021-10-07 ASSESSMENT — PAIN SCALES - GENERAL
PAINLEVEL_OUTOF10: 0
PAINLEVEL_OUTOF10: 0

## 2021-10-07 NOTE — H&P
[facility] History and Physical      CHIEF COMPLAINT:  syncope    History of Present Illness:  39 y.o. male with a history of HFpEF, CAD, HTN, JOLENE and CKD . For the past few dayshad a poor appetite, anosmia, generalized body aches . Has had some lightheadedness when he moves around. Today after getting up from the bathroom he passed out. 1700 patient was on the floor. He has no difficulty getting up due to weakness. Presented to ED for further evaluation. He is on diuretics and has been taking regularly. REVIEW OF SYSTEMS:  A comprehensive 14 point review of systems was negative except for: what is in the HPI    PMH:  Past Medical History:   Diagnosis Date    AICD (automatic cardioverter/defibrillator) present     Asthma     CAD (coronary artery disease)     Cardiac LV ejection fraction 10-20%     CHF (congestive heart failure) (HCC)     CKD (chronic kidney disease)     COPD (chronic obstructive pulmonary disease) (HCC)     Depression     Diverticulitis     GERD (gastroesophageal reflux disease)     Hyperlipidemia     Hypertension     Nonischemic cardiomyopathy (HCC)     Panic attacks     Sleep apnea     uses Cpap nightly       Surgical History:  Past Surgical History:   Procedure Laterality Date    CARDIAC DEFIBRILLATOR PLACEMENT Left 01/27/2017    [employer] single lead ICD,     DIAG[facility]ST[facility]AC [facility]ATH [facility]EDURE  2015    [facility]     [place] COLONOSCOPY FLX DX W/COLLJ SPEC WHEN PFRMD N/A 9/6/2018    COLONOSCOPY performed by Sonny Inetriano MD at [place] [address]       Medications Prior to Admission:    Prior to Admission medications    Medication Sig Start Date End Date Taking?  Authorizing Provider   carvedilol (COREG) 25 MG tablet Take 1 tablet by mouth 2 times daily 8/25/21   Thomas Morales MD   CORLANOR 5 MG TABS tablet TAKE ONE TABLET BY MOUTH TWICE A DAY WITH MEALS 8/20/21   Alexey Rios MD   hydrALAZINE (APRESOLINE) 25 MG tablet TAKE THREE (3) TABLETS BY MOUTH THREE TIMES DAILY 8/9/21   Mona Price MD   ASPIRIN LOW DOSE 81 MG chewable tablet CHEW AND SWALLOW  ONE TABLET BY MOUTH EVERY DAY 6/3/21   Ilan Angulo MD   CPAP Machine MISC by Does not apply route nightly    Historical Provider, MD   furosemide (LASIX) 40 MG tablet TAKE ONE TABLET BY MOUTH EVERY DAY 4/19/21   Ilan Angulo MD   famotidine (PEPCID) 40 MG tablet Take 1 tablet by mouth every evening 4/6/21   Crow Borden DO   spironolactone (ALDACTONE) 25 MG tablet Take 0.5 tablets by mouth daily 2/17/21   Ilan Angulo MD   isosorbide mononitrate (IMDUR) 120 MG extended release tablet TAKE ONE TABLET BY MOUTH EVERY DAY 11/2/20   Ilan Angulo MD   albuterol sulfate HFA (PROVENTIL HFA) 108 (90 BASE) MCG/ACT inhaler Inhale 2 puffs into the lungs every 6 hours as needed for Wheezing 3/16/16 5/4/23  Miguel Zuniga DO       Allergies:    Patient has no known allergies. Social History:    reports that he has never smoked. He has never used smokeless tobacco. He reports current alcohol use. He reports current drug use. Drug: Marijuana. Family History:   family history includes Cancer in his father; No Known Problems in his mother; Other in his sister.        PHYSICAL EXAM:  Vitals:  /81   Pulse 77   Temp 100.8 °F (38.2 °C)   Resp 29   Ht 5' 8\" (1.727 m)   Wt 214 lb (97.1 kg)   SpO2 93%   BMI 32.54 kg/m²   General Appearance: alert and oriented to person, place and time and in no acute distress  Skin: warm and dry, turgor not diminished  Head: normocephalic and atraumatic  Eyes: pupils equal, round, and reactive to light, extraocular eye movements intact, conjunctivae normal  Neck: neck supple and non tender without mass   Pulmonary/Chest: clear to auscultation bilaterally- no wheezes, rales or rhonchi, normal air movement, no respiratory distress  Cardiovascular: normal rate, normal S1 and S2 and 2 out of 6 systolic ejection murmur  Abdomen: soft, non-tender, non-distended, normal bowel sounds, no masses or organomegaly  Extremities: no cyanosis, no clubbing and no edema  Neurologic: no cranial nerve deficit and speech normal        LABS:  Recent Labs     10/06/21  1835      K 3.9      CO2 27   BUN 21*   CREATININE 2.6*   GLUCOSE 109*   CALCIUM 9.4       Recent Labs     10/06/21  1835   WBC 7.5   RBC 5.17   HGB 14.7   HCT 46.6   MCV 90.1   MCH 28.4   MCHC 31.5*   RDW 14.2      MPV 10.8       No results for input(s): POCGLU in the last 72 hours. Radiology:   CT Head WO Contrast   Final Result   No acute intracranial abnormality. CT Cervical Spine WO Contrast   Final Result   No acute abnormality of the cervical spine. Mild degenerative changes C5-C6. Patchy opacities in the visualized lung apices. Consider chest CT to   evaluate rest of the lungs. XR CHEST (2 VW)   Final Result   Right-sided airspace opacities. Pneumonia cannot be excluded. EKG: No acute normality    ASSESSMENT/PLAN:  COVID-19 pneumonia  Hypoxia  COVID 19 pneumonia with hypoxia  - check inflammatory markers  -check procalcitonin, hold abx for now. -start ascorbic acid, zinc, vitamin D  -dexamethasone 6 mg daily  -Due to kidney dysfunction we will monitor and consider IL-6 therapy if she has oxygen requirement.  -wean down supplemental oxygen as tolerated    Syncope  -Likely orthostatic.  -Monitor on telemetry, interrogate pacemaker  -Gentle IV fluids  -Consult cardio    Acute on CKD stage III  -Creatinine above baseline.  -Trend with fluids    History of heart failure LVEF 15%: Most recently 89%  Chronic diastolic heart failure stage I  -Hold diuretics due to above. Continue rest of home medications. Continue Corlanor. DVT prophylaxis: lovenox      NOTE: This report was transcribed using voice recognition software.  Every effort was made to ensure accuracy; however, inadvertent computerized transcription errors may be present.   Electronically signed by Ashanti Burrlel MD on 10/6/2021 at 11:17 PM

## 2021-10-07 NOTE — PROGRESS NOTES
Pt prones & turns side to side while in bed frequently. Pt educated on importance of ICS use. Pt given educational information for covid patients.

## 2021-10-07 NOTE — PROGRESS NOTES
Department of Internal Medicine  General Internal Medicine  Attending Progress Note  Chief Complaint   Patient presents with    Loss of Consciousness     dizziness     SUBJECTIVE:    Reports that he is doing better. No fever or chills. No chest pain. He is aware of plans to monitor his breathing and possibly wean him off oxygen as he is able to tolerate.      OBJECTIVE      Medications    Current Facility-Administered Medications: albuterol sulfate  (90 Base) MCG/ACT inhaler 2 puff, 2 puff, Inhalation, Q6H PRN  aspirin chewable tablet 81 mg, 81 mg, Oral, Daily  carvedilol (COREG) tablet 25 mg, 25 mg, Oral, BID  ivabradine (CORLANOR) tablet 5 mg, 5 mg, Oral, BID WC  famotidine (PEPCID) tablet 40 mg, 40 mg, Oral, QPM  hydrALAZINE (APRESOLINE) tablet 75 mg, 75 mg, Oral, 3 times per day  isosorbide mononitrate (IMDUR) extended release tablet 120 mg, 120 mg, Oral, Daily  sodium chloride flush 0.9 % injection 10 mL, 10 mL, IntraVENous, 2 times per day  sodium chloride flush 0.9 % injection 10 mL, 10 mL, IntraVENous, PRN  0.9 % sodium chloride infusion, 25 mL, IntraVENous, PRN  polyethylene glycol (GLYCOLAX) packet 17 g, 17 g, Oral, Daily PRN  acetaminophen (TYLENOL) tablet 650 mg, 650 mg, Oral, Q6H PRN **OR** acetaminophen (TYLENOL) suppository 650 mg, 650 mg, Rectal, Q6H PRN  enoxaparin (LOVENOX) injection 40 mg, 40 mg, SubCUTAneous, Daily  dexamethasone (DECADRON) injection 6 mg, 6 mg, IntraVENous, Q24H  ascorbic acid (VITAMIN C) tablet 1,000 mg, 1,000 mg, Oral, BID  zinc sulfate (ZINCATE) capsule 50 mg, 50 mg, Oral, BID  benzonatate (TESSALON) capsule 100 mg, 100 mg, Oral, TID PRN  Vitamin D (CHOLECALCIFEROL) tablet 1,000 Units, 1,000 Units, Oral, Daily  Physical    VITALS:  /67   Pulse 72   Temp 97.1 °F (36.2 °C) (Infrared)   Resp 22   Ht 5' 8\" (1.727 m)   Wt 214 lb (97.1 kg)   SpO2 91%   BMI 32.54 kg/m²   CONSTITUTIONAL:  awake, alert, cooperative, no apparent distress, and appears stated

## 2021-10-07 NOTE — ED PROVIDER NOTES
27-year-old male presenting with loss of consciousness that occurred earlier today. Patient has Covid, the last few days he has been not feeling well and has been at home. Does not use oxygen during the day but has CPAP at night. Has a cardiac defibrillator in place for a low ejection fraction. States that he was getting up to go to the bathroom, does not member what happened and does not know long how long he unconscious but woke up on the ground. Went back to bed and has been laying and sleeping in bed all day long. This syncope is a sudden onset, persistent, now resolved, unknown duration, associate with being Covid positive and a low ejection fraction. Family History   Problem Relation Age of Onset    Cancer Father     No Known Problems Mother     Other Sister         Heart murmur     Past Surgical History:   Procedure Laterality Date    CARDIAC DEFIBRILLATOR PLACEMENT Left 01/27/2017    [employer] single lead ICD,     DIAGNOSTIC CARDIAC CATH LAB PROCEDURE  2015    [facility]     IN COLONOSCOPY FLX DX W/COLLJ SPEC WHEN PFRMD N/A 9/6/2018    COLONOSCOPY performed by Derick Yost MD at [facility] ENDOSCOPY       Review of Systems   Constitutional: Positive for chills and fever. Respiratory: Negative for shortness of breath. Gastrointestinal: Negative for abdominal pain. Musculoskeletal: Positive for myalgias. Neurological: Positive for syncope. All other systems reviewed and are negative. Physical Exam  Constitutional:       General: He is not in acute distress. Appearance: He is well-developed. HENT:      Head: Normocephalic and atraumatic. Eyes:      Pupils: Pupils are equal, round, and reactive to light. Neck:      Thyroid: No thyromegaly. Cardiovascular:      Rate and Rhythm: Normal rate and regular rhythm. Pulmonary:      Effort: Pulmonary effort is normal. No respiratory distress. Breath sounds: Normal breath sounds. No wheezing. Abdominal:      General: There is no distension. Palpations: Abdomen is soft. There is no mass. Tenderness: There is no abdominal tenderness. There is no guarding or rebound. Musculoskeletal:         General: No tenderness. Normal range of motion. Cervical back: Normal range of motion and neck supple. Skin:     General: Skin is warm and dry. Findings: No erythema. Neurological:      Mental Status: He is alert and oriented to person, place, and time. Cranial Nerves: No cranial nerve deficit. Procedures     MDM         EKG: Interpreted by me  Sinus rhythm, rate of 78, right axis, no ST elevations or depressions, no T wave abnormalities. --------------------------------------------- PAST HISTORY ---------------------------------------------  Past Medical History:  has a past medical history of AICD (automatic cardioverter/defibrillator) present, Asthma, CAD (coronary artery disease), Cardiac LV ejection fraction 10-20%, CHF (congestive heart failure) ([facility] [address] CKD (chronic kidney disease), COPD (chronic obstructive pulmonary disease) ([facility] [address] Depression, Diverticulitis, GERD (gastroesophageal reflux disease), Hyperlipidemia, Hypertension, Nonischemic cardiomyopathy ([facility] [address] Panic attacks, and Sleep apnea. Past Surgical History:  has a past surgical history that includes Diagnostic Cardiac Cath Lab Procedure (2015); Cardiac defibrillator placement (Left, 01/27/2017); and pr colonoscopy flx dx w/collj spec when pfrmd (N/A, 9/6/2018). Social History:  reports that he has never smoked. He has never used smokeless tobacco. He reports current alcohol use. He reports current drug use. Drug: Marijuana. Family History: family history includes Cancer in his father; No Known Problems in his mother; Other in his sister. The patients home medications have been reviewed. Allergies: Patient has no known allergies.     -------------------------------------------------- RESULTS -------------------------------------------------    LABS:  Results for orders placed or performed during the hospital encounter of 10/06/21   COVID-19, Rapid    Specimen: Nasopharyngeal Swab   Result Value Ref Range    SARS-CoV-2, NAAT DETECTED (A) Not Detected   CBC Auto Differential   Result Value Ref Range    WBC 7.5 4.5 - 11.5 E9/L    RBC 5.17 3.80 - 5.80 E12/L    Hemoglobin 14.7 12.5 - 16.5 g/dL    Hematocrit 46.6 37.0 - 54.0 %    MCV 90.1 80.0 - 99.9 fL    MCH 28.4 26.0 - 35.0 pg    MCHC 31.5 (L) 32.0 - 34.5 %    RDW 14.2 11.5 - 15.0 fL    Platelets 400 452 - 165 E9/L    MPV 10.8 7.0 - 12.0 fL    Neutrophils % 64.4 43.0 - 80.0 %    Immature Granulocytes % 0.4 0.0 - 5.0 %    Lymphocytes % 25.2 20.0 - 42.0 %    Monocytes % 9.0 2.0 - 12.0 %    Eosinophils % 0.7 0.0 - 6.0 %    Basophils % 0.3 0.0 - 2.0 %    Neutrophils Absolute 4.81 1.80 - 7.30 E9/L    Immature Granulocytes # 0.03 E9/L    Lymphocytes Absolute 1.88 1.50 - 4.00 E9/L    Monocytes Absolute 0.67 0.10 - 0.95 E9/L    Eosinophils Absolute 0.05 0.05 - 0.50 E9/L    Basophils Absolute 0.02 0.00 - 0.20 E9/L   Comprehensive Metabolic Panel w/ Reflex to MG   Result Value Ref Range    Sodium 139 132 - 146 mmol/L    Potassium reflex Magnesium 3.9 3.5 - 5.0 mmol/L    Chloride 100 98 - 107 mmol/L    CO2 27 22 - 29 mmol/L    Anion Gap 12 7 - 16 mmol/L    Glucose 109 (H) 74 - 99 mg/dL    BUN 21 (H) 6 - 20 mg/dL    CREATININE 2.6 (H) 0.7 - 1.2 mg/dL    GFR Non-African American 32 >=60 mL/min/1.73    GFR African American 32     Calcium 9.4 8.6 - 10.2 mg/dL    Total Protein 7.7 6.4 - 8.3 g/dL    Albumin 4.0 3.5 - 5.2 g/dL    Total Bilirubin 0.5 0.0 - 1.2 mg/dL    Alkaline Phosphatase 69 40 - 129 U/L    ALT 19 0 - 40 U/L    AST 25 0 - 39 U/L   Urinalysis, reflex to microscopic   Result Value Ref Range    Color, UA Yellow Straw/Yellow    Clarity, UA Clear Clear    Glucose, Ur Negative Negative mg/dL    Bilirubin Urine Negative Negative    Ketones, Urine Negative Improved after treatment    ------------------------------------------ PROGRESS NOTES ------------------------------------------  Re-evaluation(s):  2300  Patients symptoms are improving  Repeat physical examination is improved    Counseling:  I have spoken with the patient and discussed todays results, in addition to providing specific details for the plan of care and counseling regarding the diagnosis and prognosis. Their questions are answered at this time and they are agreeable with the plan of admission.    --------------------------------- ADDITIONAL PROVIDER NOTES ---------------------------------  Consultations:  . Spoke with Dr. Karen Hinkle. Discussed case. They will admit the patient. This patient's ED course included: a personal history and physicial examination, re-evaluation prior to disposition, multiple bedside re-evaluations, IV medications and cardiac monitoring    This patient has remained hemodynamically stable during their ED course. Diagnosis:  1. JOS (acute kidney injury) ([facility] [address]    2. Syncope and collapse    3. COVID        Disposition:  Patient's disposition: Admit to telemetry  Patient's condition is stable.            Loree Raymond DO  10/07/21 [id number]

## 2021-10-07 NOTE — CONSULTS
Inpatient Cardiology Consultation      Reason for Consult:  Syncope    Consulting Physician: Erica Abdalla MD    Requesting Physician:  Jair Sanchez MD    Date of Consultation: 10/7/2021    HISTORY OF PRESENT ILLNESS OF Alistair Roper located in  room 07/07:     Alistair Roper is a 39 y.o. male  Who follows with Dr. Colten Malhotra and Dr. Edward Rios  Seen by Dr. Gonzalez Greenfield on 8/25/21    Patient has h/o nonischemic cardiopathy with last EF  60% (Stress test  5/11/21), AICD in situ, mild TR, HTN, HLD, CKD stage III,  JOLENE. STRESS TEST  5/11/21 showed normal perfusion images and EF 60%    Patient presented to ED of [address] [place] [address] on 10/6/21 complaining of passing out while having chills, fevers, malaise. In ED he was found to have a COVID-19 pneumonia and was admitted for further evaluation with diagnosis: COVID-19 pneumonia with hypoxia; syncope; acute on chronic CKD      Past Medical and Surgical History:   1. Nonischemic Cardiomyopathy s/p ICD in 01/2017  2. Cardiac catheterization, left and right () 02/19/2016: Right dominant system, normal coronaries. Normal hemodynamics. Normal resting hemodynamics. Medical therapy for nonischemic cardiomyopathy advised  3. TTE 07/01/2016 (Dr. Colten Malhotra): EF 20-25%. Borderline LVH. Mild MR.  4. TTE 11/4/2016 (Dr. Colten Malhotra): LV is mildly enlarged. Borderline concentric LVH. Overall EF severely decreased. EF 25%. Doppler evidence of stage I diastolic dysfunction. 5. TTE 09/30/2020 (Dr. Colten Malhotra): Left ventricular internal dimensions were normal in diastole and systole. Mild LVH. No regional wall motion abnormality seen. EF 65%. Stage I diastolic dysfunction. Mild TR. 6. Lexiscan MPS 05/11/2021: Nonischemic. Myocardial perfusion imaging was normal with attenuation artifact. Overall LV systolic function was normal without regional wall motion abnormalities. EF 60%. Low risk general pharmacologic stress test  7. HTN  8. HLD  9. Obesity  10.  JOLENE, compliant with CPAP  11. COPD  15. H/o Chronic HFrEF  13. CKD Stage III   14. GERD, Hx Diverticulitis  15. Depression  16. Hx Panic Attacks    Past Medical History:   Diagnosis Date    AICD (automatic cardioverter/defibrillator) present     Asthma     CAD (coronary artery disease)     Cardiac LV ejection fraction 10-20%     CHF (congestive heart failure) (HCC)     CKD (chronic kidney disease)     COPD (chronic obstructive pulmonary disease) (HCC)     Depression     Diverticulitis     GERD (gastroesophageal reflux disease)     Hyperlipidemia     Hypertension     Nonischemic cardiomyopathy (HCC)     Panic attacks     Sleep apnea     uses Cpap nightly       Past Surgical History:    Past Surgical History:   Procedure Laterality Date    CARDIAC DEFIBRILLATOR PLACEMENT Left 01/27/2017    [employer] single lead ICD,     DIAGNOSTIC CARDIAC CATH LAB PROCEDURE  2015    [facility]     GA COLONOSCOPY FLX DX W/COLLJ SPEC WHEN PFRMD N/A 9/6/2018    COLONOSCOPY performed by Pat Turcios MD at [place] [address]       Medications Prior to admit:  Prior to Admission medications    Medication Sig Start Date End Date Taking?  Authorizing Provider   carvedilol (COREG) 25 MG tablet Take 1 tablet by mouth 2 times daily 8/25/21   Ines Spencer MD   CORLANOR 5 MG TABS tablet TAKE ONE TABLET BY MOUTH TWICE A DAY WITH MEALS 8/20/21   Denilson Hagan MD   hydrALAZINE (APRESOLINE) 25 MG tablet TAKE THREE (3) TABLETS BY MOUTH THREE TIMES DAILY 8/9/21   Bhargavi Reich MD   ASPIRIN LOW DOSE 81 MG chewable tablet CHEW AND SWALLOW  ONE TABLET BY MOUTH EVERY DAY 6/3/21   Catarina Soto MD   CPAP Machine MISC by Does not apply route nightly    Historical Provider, MD   furosemide (LASIX) 40 MG tablet TAKE ONE TABLET BY MOUTH EVERY DAY 4/19/21   Catarina Soto MD   famotidine (PEPCID) 40 MG tablet Take 1 tablet by mouth every evening 4/6/21   Meredith Ramos,    spironolactone (ALDACTONE) 25 MG tablet Take 0.5 tablets by mouth daily 2/17/21   Bere Oliver MD   isosorbide mononitrate (IMDUR) 120 MG extended release tablet TAKE ONE TABLET BY MOUTH EVERY DAY 11/2/20   Bere Oliver MD   albuterol sulfate HFA (PROVENTIL HFA) 108 (90 BASE) MCG/ACT inhaler Inhale 2 puffs into the lungs every 6 hours as needed for Wheezing 3/16/16 5/4/23  Justyna Parr DO       Current Medications:    Current Facility-Administered Medications: albuterol sulfate  (90 Base) MCG/ACT inhaler 2 puff, 2 puff, Inhalation, Q6H PRN  aspirin chewable tablet 81 mg, 81 mg, Oral, Daily  carvedilol (COREG) tablet 25 mg, 25 mg, Oral, BID  ivabradine (CORLANOR) tablet 5 mg, 5 mg, Oral, BID WC  famotidine (PEPCID) tablet 40 mg, 40 mg, Oral, QPM  hydrALAZINE (APRESOLINE) tablet 75 mg, 75 mg, Oral, 3 times per day  isosorbide mononitrate (IMDUR) extended release tablet 120 mg, 120 mg, Oral, Daily  0.9 % sodium chloride infusion, , IntraVENous, Continuous  sodium chloride flush 0.9 % injection 10 mL, 10 mL, IntraVENous, 2 times per day  sodium chloride flush 0.9 % injection 10 mL, 10 mL, IntraVENous, PRN  0.9 % sodium chloride infusion, 25 mL, IntraVENous, PRN  polyethylene glycol (GLYCOLAX) packet 17 g, 17 g, Oral, Daily PRN  acetaminophen (TYLENOL) tablet 650 mg, 650 mg, Oral, Q6H PRN **OR** acetaminophen (TYLENOL) suppository 650 mg, 650 mg, Rectal, Q6H PRN  enoxaparin (LOVENOX) injection 40 mg, 40 mg, SubCUTAneous, Daily  dexamethasone (DECADRON) injection 6 mg, 6 mg, IntraVENous, Q24H  ascorbic acid (VITAMIN C) tablet 1,000 mg, 1,000 mg, Oral, BID  zinc sulfate (ZINCATE) capsule 50 mg, 50 mg, Oral, BID  benzonatate (TESSALON) capsule 100 mg, 100 mg, Oral, TID PRN  Vitamin D (CHOLECALCIFEROL) tablet 1,000 Units, 1,000 Units, Oral, Daily    Allergies:  Patient has no known allergies.     Social History:    Social History     Socioeconomic History    Marital status: Single     Spouse name: Not on file    Number of children: Not on file    Years of education: Not on file    Highest education level: Not on file   Occupational History    Not on file   Tobacco Use    Smoking status: Never Smoker    Smokeless tobacco: Never Used    Tobacco comment: Patient counseled to remain smoke free   Vaping Use    Vaping Use: Never used   Substance and Sexual Activity    Alcohol use: Yes     Comment: 2-3 times monthly. coffee rarely     Drug use: Yes     Types: Marijuana     Comment: Occasionallu    Sexual activity: Yes     Partners: Female     Comment:  for 7 years   Other Topics Concern    Not on file   Social History Narrative    Not on file     Social Determinants of Health     Financial Resource Strain: Low Risk     Difficulty of Paying Living Expenses: Not hard at all   Food Insecurity: No Food Insecurity    Worried About Running Out of Food in the Last Year: Never true    [address] in the Last Year: Never true   Transportation Needs: No Transportation Needs    Lack of Transportation (Medical): No    Lack of Transportation (Non-Medical): No   Physical Activity:     Days of Exercise per Week:     Minutes of Exercise per Session:    Stress:     Feeling of Stress :    Social Connections:     Frequency of Communication with Friends and Family:     Frequency of Social Gatherings with Friends and Family:     Attends [employer] Services:     Active Member of Clubs or Organizations:     Attends Club or Organization Meetings:     Marital Status:    Intimate Partner Violence:     Fear of Current or Ex-Partner:     Emotionally Abused:     Physically Abused:     Sexually Abused:        Family History:   Family History   Problem Relation Age of Onset    Cancer Father     No Known Problems Mother     Other Sister         Heart murmur         REVIEW OF SYSTEMS:     Constitutional: Denies fatigue, fevers, chills, night sweats, evonne loss, evonne gain  HEENT: Headaches.  Denies , nose bleeds, and blurred vision,oral pain, oral lesion. Neurological: Denies history of stroke, weakness, dizziness and lightheadedness, numbness and tingling  Cardiovascular: Denies chest pain, SOB,  palpitations, and feelings of heart racing. Respiratory: Has cough. Uses  CPAP at night at home. Denies, wheezing,  use of supplementary oxygen,   Gastrointestinal: Has diarrhea. Denies heartburn, nausea, vomiting,  and constipation, bloody of black/tarry stools. Genitourinary:Denies pain on  urination,  blood in urine, trouble starting urination, need to strain on urination, urinary urgency, urinary incontinence. Hematologic: Denies history of easy bruising, prolonged bleeding,  of blood clots in legs  Lymphatic: Denies lumps and bumps to neck, axilla, breast, and groin. Endocrine: Denies excessive thirst. Denies intolerance to heat or cold  Musculoskeletal: Denies falls, pain to BLE with ambulation and edema to BLE. Psychiatric: Denies anxiety and depression. PHYSICAL EXAM:   /88   Pulse 76   Temp 100.7 °F (38.2 °C) (Oral)   Resp 24   Ht 5' 8\" (1.727 m)   Wt 214 lb (97.1 kg)   SpO2 96%   BMI 39.71 kg/m²   Systolic (80GIO), CODIE:486 , Min:105 , KPS:479    Diastolic (17TOY), SXM:82, Min:58, Max:91    Physical exam was not performed in order to prevent COVID-19 spreading and to spare PPE. Physical exams performed by other providers were reviewed. DATA:    ECG reviewed with Dr. Tameka Pascal strips: SR  Diagnostic:  Last Cath 2015 [facility]: per Dr. Cathy Middleton' note (3/16)  Cath at [facility] apparently showed 30% distal LM but no other CAD  LVEF 15-20%    ECHO 9/30/21  Summary   Left ventricular internal dimensions were normal in diastole and systole. Mild left ventricular concentric hypertrophy noted. No regional wall motion abnormalities seen. Normal left ventricular ejection fraction. There is doppler evidence of stage I diastolic dysfunction. Mild tricuspid regurgitation.     STRESS TEST  5/11/21  Gated SPECT left ventricular ejection fraction was calculated to   be 60%, with normal myocardial thickening and wall motion. Impression:     1. Electrocardiographically normal regadenoson infusion with a   clinically non-ischemic response when compared to the baseline   ECG   2. Myocardial perfusion imaging was normal with attenuation   artifact.     3. Overall left ventricular systolic function was normal without   regional wall motion abnormalities. 4.   Low risk general pharmacologic stress test.    XR CHEST (2 VW)    Result Date: 10/6/2021  EXAMINATION: TWO XRAY VIEWS OF THE CHEST 10/6/2021 7:02 pm COMPARISON: None. HISTORY: ORDERING SYSTEM PROVIDED HISTORY: syncope TECHNOLOGIST PROVIDED HISTORY: Reason for exam:->syncope FINDINGS: Right lung airspace opacities. .  There is no effusion or pneumothorax. Stable heart size. The osseous structures are without acute process. Left-sided single lead transvenous pacer device. Right-sided airspace opacities. Pneumonia cannot be excluded. CT Head WO Contrast    Result Date: 10/6/2021  EXAMINATION: CT OF THE HEAD WITHOUT CONTRAST  10/6/2021 7:18 pm TECHNIQUE: CT of the head was performed without the administration of intravenous contrast. Dose modulation, iterative reconstruction, and/or weight based adjustment of the mA/kV was utilized to reduce the radiation dose to as low as reasonably achievable. COMPARISON: None. HISTORY: ORDERING SYSTEM PROVIDED HISTORY: syncope TECHNOLOGIST PROVIDED HISTORY: Reason for exam:->syncope Has a \"code stroke\" or \"stroke alert\" been called? ->No Decision Support Exception - unselect if not a suspected or confirmed emergency medical condition->Emergency Medical Condition (MA) FINDINGS: BRAIN/VENTRICLES: There is no acute intracranial hemorrhage, mass effect or midline shift. No abnormal extra-axial fluid collection. The gray-white differentiation is maintained without evidence of an acute infarct. There is no evidence of hydrocephalus.  ORBITS: The visualized portion of the orbits demonstrate no acute abnormality. SINUSES: The visualized paranasal sinuses and mastoid air cells demonstrate no acute abnormality. SOFT TISSUES/SKULL:  No acute abnormality of the visualized skull or soft tissues. No acute intracranial abnormality. CT Cervical Spine WO Contrast    Result Date: 10/6/2021  EXAMINATION: CT OF THE CERVICAL SPINE WITHOUT CONTRAST 10/6/2021 7:18 pm TECHNIQUE: CT of the cervical spine was performed without the administration of intravenous contrast. Multiplanar reformatted images are provided for review. Dose modulation, iterative reconstruction, and/or weight based adjustment of the mA/kV was utilized to reduce the radiation dose to as low as reasonably achievable. COMPARISON: None. HISTORY: ORDERING SYSTEM PROVIDED HISTORY: syncope with fall TECHNOLOGIST PROVIDED HISTORY: Reason for exam:->syncope with fall Decision Support Exception - unselect if not a suspected or confirmed emergency medical condition->Emergency Medical Condition (MA) FINDINGS: BONES/ALIGNMENT: There is no acute fracture or traumatic malalignment. DEGENERATIVE CHANGES: Mild degenerative changes with posterior bony ridging C5-C6. SOFT TISSUES: There is no prevertebral soft tissue swelling. Lungs: There are patchy opacities in the visualized lung apices. No acute abnormality of the cervical spine. Mild degenerative changes C5-C6. Patchy opacities in the visualized lung apices. Consider chest CT to evaluate rest of the lungs. Labs:   CARDIAC ENZYMES:  No results for input(s): CKTOTAL, CKMB, CKMBINDEX, TROPHS in the last 72 hours. H/O TROPONIN levels    Lab Results   Component Value Date    TROPHS 35 08/25/2021    TROPHS 59 08/24/2021    TROPHS 39 08/24/2021    TROPHS 26 08/24/2021     No results for input(s): PROBNP in the last 72 hours.   Lab Results   Component Value Date    PROBNP 22 08/24/2021    PROBNP 940 05/10/2016    PROBNP 1,917 04/05/2016    PROBNP 2,333 02/29/2016 BMP:   Recent Labs     10/06/21  1835 10/07/21  0532    137   K 3.9 3.8   CO2 27 25   BUN 21* 23*   CREATININE 2.6* 2.3*   LABGLOM 32 37   CALCIUM 9.4 9.0     Lab Results   Component Value Date    CREATININE 2.3 10/07/2021    CREATININE 2.6 10/06/2021    CREATININE 2.1 08/25/2021    CREATININE 2.3 08/24/2021    CREATININE 1.6 08/16/2018    CREATININE 2.2 10/03/2017    CREATININE 2.1 01/28/2017    CREATININE 2.0 01/27/2017    CREATININE 2.3 07/21/2016    CREATININE 2.2 06/21/2016    CREATININE 2.2 05/10/2016    CREATININE 2.1 04/22/2016    CREATININE 2.2 02/29/2016    CREATININE 1.8 10/06/2015    CREATININE 1.9 08/27/2015    CREATININE 2.1 01/16/2015     CBC:   Recent Labs     10/06/21  1835 10/07/21  0532   WBC 7.5 6.9   HGB 14.7 14.8   HCT 46.6 46.7    191     Mag: No results for input(s): MG in the last 72 hours. Phos: No results for input(s): PHOS in the last 72 hours. TSH: No results for input(s): TSH in the last 72 hours. HgA1c:   Lab Results   Component Value Date    LABA1C 5.9 08/27/2015     No results found for: EAG  BNP: No results for input(s): BNP in the last 72 hours. PT/INR: No results for input(s): PROTIME, INR in the last 72 hours. APTT:No results for input(s): APTT in the last 72 hours.     FASTING LIPID PANEL:  Lab Results   Component Value Date    CHOL 142 08/25/2021    HDL 29 08/25/2021    LDLCALC 62 08/25/2021    TRIG 257 08/25/2021     LIVER PROFILE:  Recent Labs     10/06/21  1835   AST 25   ALT 19   LABALBU 4.0       COVID-19 Labs:  Lab Results   Component Value Date    COVID19 DETECTED 10/06/2021     Recent Labs     10/06/21  1836   COVID19 DETECTED*             ASSESSMENT:  Syncopal episode(unwitnessed) of unclear etiology in context of COVID-19 pneumonia   H/o Nonischemic cardiopathy with last EF  60% (Stress test  5/11/21)  Single Chamber Implantable Cardioverter/Defibrillator ([employer])1/27/2017 by         Irma Lancaster MD  HTN, controlled  HLD  JOS/CKD  COPD  JOLENE on CPAP  Obesity    PLAN:  Telemetry  Continue current therapy  ICD interrogation  Blood pressure control  Electrolytes check and correction    Renal function check      Further cardiac recommendations will be forthcoming pending patient clinical course. Assessment and plan discussed with Dr. Madison Noble MD    Assessment and Plan to follow as per Dr. Madison Noble MD    Electronically signed by LIBORIO Toure on 10/7/2021 at 7:58 AM     Addendum:  Madison Noble MD    Reason for consult: Syncope    Patient previously known to: Dr. Leon Kayser    History of Present illness: 39year old patient with history of  nonischemic cardiopathy with recovered EF, last EF  60% (Stress test  5/11/21), AICD in situ, mild TR, HTN, HLD, CKD stage III,  JOLENE  presented to ED of [address] [place] [address] on 10/6/21 complaining of passing out while having chills, fevers, malaise. In ED he was found to have a COVID-19 pneumonia and was admitted for further evaluation with diagnosis: COVID-19 pneumonia with hypoxia; syncope; acute on chronic CKD. HPI from EMR since Patient was not seen in person due to COVID-19 status, to limit personal exposure and limit PPE utilization. Cardiology consulted for syncope. No ICD shocks. Review of systems:  Review of 10 systems limited since Patient was not seen in person due to COVID-19 status, to limit personal exposure and limit PPE utilization. Medical History: Reviewed    Surgical history: Reviewed    FamilyHistory: Reviewed    Allergies:  Reviewed    Social Hx: Reviewed.     Scheduled Meds:   aspirin  81 mg Oral Daily    carvedilol  25 mg Oral BID    ivabradine  5 mg Oral BID WC    famotidine  40 mg Oral QPM    hydrALAZINE  75 mg Oral 3 times per day    isosorbide mononitrate  120 mg Oral Daily    sodium chloride flush  10 mL IntraVENous 2 times per day    enoxaparin  40 mg SubCUTAneous Daily    dexamethasone  6 mg IntraVENous Q24H    vitamin C 1,000 mg Oral BID    zinc sulfate  50 mg Oral BID    Vitamin D  1,000 Units Oral Daily     Continuous Infusions:   sodium chloride 75 mL/hr at 10/07/21 0500    sodium chloride       PRN Meds:.albuterol sulfate HFA, sodium chloride flush, sodium chloride, polyethylene glycol, acetaminophen **OR** acetaminophen, benzonatate      Labs/imaging studies: Reviewed. ECHO 9/30/21  Summary   Left ventricular internal dimensions were normal in diastole and systole. Mild left ventricular concentric hypertrophy noted. No regional wall motion abnormalities seen. Normal left ventricular ejection fraction. There is doppler evidence of stage I diastolic dysfunction. Mild tricuspid regurgitation. STRESS TEST  5/11/21  Gated SPECT left ventricular ejection fraction was calculated to   be 60%, with normal myocardial thickening and wall motion. Impression:     1. Electrocardiographically normal regadenoson infusion with a   clinically non-ischemic response when compared to the baseline   ECG   2. Myocardial perfusion imaging was normal with attenuation   artifact.     3. Overall left ventricular systolic function was normal without   regional wall motion abnormalities. 4.   Low risk general pharmacologic stress test.    Above ELAINE exam, assessment reviewed and reflect my work. I personally saw, examined, and evaluated the patient today. I personally reviewed the medications, rhythm strips, pertinent labs and test reports. I directly participated in the medical-decision making, ordering pertinent tests and medication adjustment. Physical exam:     Vitals:    10/07/21 0855   BP:    Pulse:    Resp:    Temp:    SpO2: 93%       Patient was not seen in person due to COVID-19 status, to limit personal exposure and limit PPE utilization.   Our ELAINE, [employer] Kya Sue talked to the pt over telephone        Impression/Recommendations:    Syncopal episode(unwitnessed) of unclear etiology in context of COVID-19 pneumonia - Monitor BP,  ICD will be interrogated to r/o VT/VF    H/o Nonischemic cardiopathy - (EF 20-25% in 2016) Improved, last EF  60% (Stress test  5/11/21) - s/p ICD - ICD will be interrogated to r/o VT/VF    S/p Single Chamber Implantable Cardioverter/Defibrillator ([employer])1/27/2017 by Nile Cadena MD    HTN,  Controlled    OJS/CKD - Monitor renal Fn    JOLENE on CPAP              No further cardiac testing  Continue current cardiac medications      Thank you for the consult.         Anthony St MD  10/7/2021  10:30 AM  [facility] ([facility]) Cardiology

## 2021-10-07 NOTE — PROGRESS NOTES
Remdesivir Initiation Note    This patient meets criteria for initiation of remdesivir based on the following:  · Proven COVID-19  · Moderate disease (Requiring supplemental O2)  · Acceptable hepatic function (ALT within 5 times ULN)    Exclusion Criteria:   Severe disease requiring invasive or non-invasive mechanical ventilation (includes HFNC & BiPAP)   Could consider use in patients requiring high flow if early on in the disease course (based on symptom duration)   Use of more than 1 vasopressor prior to remdesivir initiation   Already improving on supportive treatment and/or impending discharge   Patients in whom the clinical team think death is in the immediate short-term where remdesivir is unlikely to change the clinical outcome     Liver function tests will be monitored daily while on remdesivir.     Leigh Ann Gilliland, PharmD, BCPS 10/7/2021 3:55 PM   [phone]

## 2021-10-07 NOTE — ED NOTES
Attempted to call report on pt.   Was told \"that RN is not here yet\"     Evon Buchanan RN  10/07/21 0666

## 2021-10-07 NOTE — ACP (ADVANCE CARE PLANNING)
Advance Care Planning     Advance Care Planning Activator (Inpatient)  Conversation Note      Date of ACP Conversation: 10/7/2021     Conversation Conducted with: Patient with Decision Making Capacity    ACP Activator: Diana Koroma, [id number] Maker:     Current Designated Health Care Decision Maker:     Primary Decision Maker: Odell Ovidio - Brother/Sister - [phone]    Secondary Decision Maker: Agustín Shoemaker - Spouse - [phone]    Care Preferences    Ventilation: \"If you were in your present state of health and suddenly became very ill and were unable to breathe on your own, what would your preference be about the use of a ventilator (breathing machine) if it were available to you? \"      Would the patient desire the use of ventilator (breathing machine)?: yes    \"If your health worsens and it becomes clear that your chance of recovery is unlikely, what would your preference be about the use of a ventilator (breathing machine) if it were available to you? \"     Would the patient desire the use of ventilator (breathing machine)?: Yes      Resuscitation  \"CPR works best to restart the heart when there is a sudden event, like a heart attack, in someone who is otherwise healthy. Unfortunately, CPR does not typically restart the heart for people who have serious health conditions or who are very sick. \"    \"In the event your heart stopped as a result of an underlying serious health condition, would you want attempts to be made to restart your heart (answer \"yes\" for attempt to resuscitate) or would you prefer a natural death (answer \"no\" for do not attempt to resuscitate)? \" yes       [] Yes   [] No   Educated Patient / [facility] regarding differences between Advance Directives and portable DNR orders.     Length of ACP Conversation in minutes:      Conversation Outcomes:  [x] ACP discussion completed  [] Existing advance directive reviewed with patient; no changes to patient's previously recorded wishes  [] New Advance Directive completed  [] Portable Do Not Rescitate prepared for Provider review and signature  [] POLST/POST/MOLST/MOST prepared for Provider review and signature      Follow-up plan:    [] Schedule follow-up conversation to continue planning  [] Referred individual to Provider for additional questions/concerns   [] Advised patient/agent/surrogate to review completed ACP document and update if needed with changes in condition, patient preferences or care setting    [] This note routed to one or more involved healthcare providers

## 2021-10-08 LAB
ALBUMIN SERPL-MCNC: 3.5 G/DL (ref 3.5–5.2)
ALP BLD-CCNC: 61 U/L (ref 40–129)
ALT SERPL-CCNC: 15 U/L (ref 0–40)
ANION GAP SERPL CALCULATED.3IONS-SCNC: 9 MMOL/L (ref 7–16)
AST SERPL-CCNC: 21 U/L (ref 0–39)
BASOPHILS ABSOLUTE: 0.01 E9/L (ref 0–0.2)
BASOPHILS RELATIVE PERCENT: 0.1 % (ref 0–2)
BILIRUB SERPL-MCNC: 0.3 MG/DL (ref 0–1.2)
BILIRUBIN DIRECT: <0.2 MG/DL (ref 0–0.3)
BILIRUBIN, INDIRECT: NORMAL MG/DL (ref 0–1)
BUN BLDV-MCNC: 27 MG/DL (ref 6–20)
C-REACTIVE PROTEIN: 10.8 MG/DL (ref 0–0.4)
CALCIUM SERPL-MCNC: 9.3 MG/DL (ref 8.6–10.2)
CHLORIDE BLD-SCNC: 105 MMOL/L (ref 98–107)
CO2: 24 MMOL/L (ref 22–29)
CREAT SERPL-MCNC: 2.1 MG/DL (ref 0.7–1.2)
EOSINOPHILS ABSOLUTE: 0 E9/L (ref 0.05–0.5)
EOSINOPHILS RELATIVE PERCENT: 0 % (ref 0–6)
FERRITIN: 1058 NG/ML
FERRITIN: 821 NG/ML
GFR AFRICAN AMERICAN: 41
GFR NON-AFRICAN AMERICAN: 41 ML/MIN/1.73
GLUCOSE BLD-MCNC: 129 MG/DL (ref 74–99)
HCT VFR BLD CALC: 42.4 % (ref 37–54)
HEMOGLOBIN: 13.4 G/DL (ref 12.5–16.5)
IMMATURE GRANULOCYTES #: 0.07 E9/L
IMMATURE GRANULOCYTES %: 0.6 % (ref 0–5)
LACTATE DEHYDROGENASE: 386 U/L (ref 135–225)
LYMPHOCYTES ABSOLUTE: 1.41 E9/L (ref 1.5–4)
LYMPHOCYTES RELATIVE PERCENT: 12.2 % (ref 20–42)
MCH RBC QN AUTO: 28.2 PG (ref 26–35)
MCHC RBC AUTO-ENTMCNC: 31.6 % (ref 32–34.5)
MCV RBC AUTO: 89.1 FL (ref 80–99.9)
MONOCYTES ABSOLUTE: 0.57 E9/L (ref 0.1–0.95)
MONOCYTES RELATIVE PERCENT: 4.9 % (ref 2–12)
NEUTROPHILS ABSOLUTE: 9.47 E9/L (ref 1.8–7.3)
NEUTROPHILS RELATIVE PERCENT: 82.2 % (ref 43–80)
PDW BLD-RTO: 14 FL (ref 11.5–15)
PLATELET # BLD: 205 E9/L (ref 130–450)
PMV BLD AUTO: 10.7 FL (ref 7–12)
POTASSIUM REFLEX MAGNESIUM: 4.4 MMOL/L (ref 3.5–5)
PROCALCITONIN: 0.25 NG/ML (ref 0–0.08)
RBC # BLD: 4.76 E12/L (ref 3.8–5.8)
SODIUM BLD-SCNC: 138 MMOL/L (ref 132–146)
TOTAL PROTEIN: 7 G/DL (ref 6.4–8.3)
WBC # BLD: 11.5 E9/L (ref 4.5–11.5)

## 2021-10-08 PROCEDURE — 2580000003 HC RX 258: Performed by: INTERNAL MEDICINE

## 2021-10-08 PROCEDURE — 80076 HEPATIC FUNCTION PANEL: CPT

## 2021-10-08 PROCEDURE — 99232 SBSQ HOSP IP/OBS MODERATE 35: CPT | Performed by: INTERNAL MEDICINE

## 2021-10-08 PROCEDURE — 82728 ASSAY OF FERRITIN: CPT

## 2021-10-08 PROCEDURE — 6370000000 HC RX 637 (ALT 250 FOR IP): Performed by: INTERNAL MEDICINE

## 2021-10-08 PROCEDURE — 83615 LACTATE (LD) (LDH) ENZYME: CPT

## 2021-10-08 PROCEDURE — 2060000000 HC ICU INTERMEDIATE R&B

## 2021-10-08 PROCEDURE — 36415 COLL VENOUS BLD VENIPUNCTURE: CPT

## 2021-10-08 PROCEDURE — 84145 PROCALCITONIN (PCT): CPT

## 2021-10-08 PROCEDURE — 85025 COMPLETE CBC W/AUTO DIFF WBC: CPT

## 2021-10-08 PROCEDURE — 2500000003 HC RX 250 WO HCPCS: Performed by: INTERNAL MEDICINE

## 2021-10-08 PROCEDURE — 86140 C-REACTIVE PROTEIN: CPT

## 2021-10-08 PROCEDURE — 80048 BASIC METABOLIC PNL TOTAL CA: CPT

## 2021-10-08 PROCEDURE — 6360000002 HC RX W HCPCS: Performed by: INTERNAL MEDICINE

## 2021-10-08 RX ADMIN — OXYCODONE HYDROCHLORIDE AND ACETAMINOPHEN 1000 MG: 500 TABLET ORAL at 22:16

## 2021-10-08 RX ADMIN — ZINC SULFATE 220 MG (50 MG) CAPSULE 50 MG: CAPSULE at 22:16

## 2021-10-08 RX ADMIN — HYDRALAZINE HYDROCHLORIDE 75 MG: 25 TABLET, FILM COATED ORAL at 14:55

## 2021-10-08 RX ADMIN — IVABRADINE 5 MG: 5 TABLET, FILM COATED ORAL at 08:10

## 2021-10-08 RX ADMIN — ASPIRIN 81 MG CHEWABLE TABLET 81 MG: 81 TABLET CHEWABLE at 08:10

## 2021-10-08 RX ADMIN — SODIUM CHLORIDE, PRESERVATIVE FREE 10 ML: 5 INJECTION INTRAVENOUS at 08:10

## 2021-10-08 RX ADMIN — DEXAMETHASONE SODIUM PHOSPHATE 6 MG: 10 INJECTION INTRAMUSCULAR; INTRAVENOUS at 05:42

## 2021-10-08 RX ADMIN — IVABRADINE 5 MG: 5 TABLET, FILM COATED ORAL at 16:54

## 2021-10-08 RX ADMIN — ZINC SULFATE 220 MG (50 MG) CAPSULE 50 MG: CAPSULE at 08:10

## 2021-10-08 RX ADMIN — ISOSORBIDE MONONITRATE 120 MG: 60 TABLET, EXTENDED RELEASE ORAL at 08:10

## 2021-10-08 RX ADMIN — HYDRALAZINE HYDROCHLORIDE 75 MG: 25 TABLET, FILM COATED ORAL at 05:42

## 2021-10-08 RX ADMIN — OXYCODONE HYDROCHLORIDE AND ACETAMINOPHEN 1000 MG: 500 TABLET ORAL at 08:10

## 2021-10-08 RX ADMIN — FAMOTIDINE 40 MG: 20 TABLET, FILM COATED ORAL at 16:54

## 2021-10-08 RX ADMIN — CARVEDILOL 25 MG: 25 TABLET, FILM COATED ORAL at 08:10

## 2021-10-08 RX ADMIN — Medication 1000 UNITS: at 08:10

## 2021-10-08 RX ADMIN — HYDRALAZINE HYDROCHLORIDE 75 MG: 25 TABLET, FILM COATED ORAL at 22:16

## 2021-10-08 RX ADMIN — REMDESIVIR 100 MG: 100 INJECTION, POWDER, LYOPHILIZED, FOR SOLUTION INTRAVENOUS at 16:54

## 2021-10-08 RX ADMIN — ENOXAPARIN SODIUM 40 MG: 40 INJECTION SUBCUTANEOUS at 08:10

## 2021-10-08 RX ADMIN — CARVEDILOL 25 MG: 25 TABLET, FILM COATED ORAL at 22:16

## 2021-10-08 RX ADMIN — SODIUM CHLORIDE, PRESERVATIVE FREE 10 ML: 5 INJECTION INTRAVENOUS at 22:16

## 2021-10-08 ASSESSMENT — PAIN SCALES - GENERAL
PAINLEVEL_OUTOF10: 0

## 2021-10-08 NOTE — PROGRESS NOTES
Department of Internal Medicine  General Internal Medicine  Attending Progress Note  Chief Complaint   Patient presents with    Loss of Consciousness     dizziness     SUBJECTIVE:    Reports that he is feeling much better. No fever or chills. No chest pain. He is aware of plans to continue antiviral while awaiting for cardiology final recommendation.      OBJECTIVE      Medications    Current Facility-Administered Medications: albuterol sulfate  (90 Base) MCG/ACT inhaler 2 puff, 2 puff, Inhalation, Q6H PRN  aspirin chewable tablet 81 mg, 81 mg, Oral, Daily  carvedilol (COREG) tablet 25 mg, 25 mg, Oral, BID  ivabradine (CORLANOR) tablet 5 mg, 5 mg, Oral, BID WC  famotidine (PEPCID) tablet 40 mg, 40 mg, Oral, QPM  hydrALAZINE (APRESOLINE) tablet 75 mg, 75 mg, Oral, 3 times per day  isosorbide mononitrate (IMDUR) extended release tablet 120 mg, 120 mg, Oral, Daily  sodium chloride flush 0.9 % injection 10 mL, 10 mL, IntraVENous, 2 times per day  sodium chloride flush 0.9 % injection 10 mL, 10 mL, IntraVENous, PRN  0.9 % sodium chloride infusion, 25 mL, IntraVENous, PRN  polyethylene glycol (GLYCOLAX) packet 17 g, 17 g, Oral, Daily PRN  acetaminophen (TYLENOL) tablet 650 mg, 650 mg, Oral, Q6H PRN **OR** acetaminophen (TYLENOL) suppository 650 mg, 650 mg, Rectal, Q6H PRN  enoxaparin (LOVENOX) injection 40 mg, 40 mg, SubCUTAneous, Daily  dexamethasone (DECADRON) injection 6 mg, 6 mg, IntraVENous, Q24H  ascorbic acid (VITAMIN C) tablet 1,000 mg, 1,000 mg, Oral, BID  zinc sulfate (ZINCATE) capsule 50 mg, 50 mg, Oral, BID  benzonatate (TESSALON) capsule 100 mg, 100 mg, Oral, TID PRN  Vitamin D (CHOLECALCIFEROL) tablet 1,000 Units, 1,000 Units, Oral, Daily  [COMPLETED] remdesivir 200 mg in sodium chloride 0.9 % 250 mL IVPB, 200 mg, IntraVENous, Once **FOLLOWED BY** remdesivir 100 mg in sodium chloride 0.9 % 250 mL IVPB, 100 mg, IntraVENous, Q24H  0.9 % sodium chloride bolus, 30 mL, IntraVENous, PRN  guaiFENesin-dextromethorphan (ROBITUSSIN DM) 100-10 MG/5ML syrup 5 mL, 5 mL, Oral, Q4H PRN  Physical    VITALS:  BP 98/60   Pulse 67   Temp 97.4 °F (36.3 °C) (Infrared)   Resp 16   Ht 5' 8\" (1.727 m)   Wt 214 lb (97.1 kg)   SpO2 91%   BMI 32.54 kg/m²   CONSTITUTIONAL:  awake, alert, cooperative, no apparent distress, and appears stated age  EYES:  Lids and lashes normal, pupils equal, round and reactive to light, extra ocular muscles intact, sclera clear, conjunctiva normal  ENT:  normocepalic, without obvious abnormality  LUNGS:  good air exchange and clear to auscultation  CARDIOVASCULAR:  Normal apical impulse, regular rate and rhythm, normal S1 and S2, no S3 or S4, and no murmur noted  ABDOMEN:  normal bowel sounds and non-tender  MUSCULOSKELETAL:  tone is normal  NEUROLOGIC:  Mental Status Exam:  Level of Alertness:   awake  Orientation:   person, place, time  SKIN:  no bruising or bleeding  Data    CBC:   Lab Results   Component Value Date    WBC 11.5 10/08/2021    RBC 4.76 10/08/2021    HGB 13.4 10/08/2021    HCT 42.4 10/08/2021    MCV 89.1 10/08/2021    MCH 28.2 10/08/2021    MCHC 31.6 10/08/2021    RDW 14.0 10/08/2021     10/08/2021    MPV 10.7 10/08/2021     BMP:    Lab Results   Component Value Date     10/08/2021    K 4.4 10/08/2021     10/08/2021    CO2 24 10/08/2021    BUN 27 10/08/2021    LABALBU 3.5 10/08/2021    CREATININE 2.1 10/08/2021    CALCIUM 9.3 10/08/2021    GFRAA 41 10/08/2021    LABGLOM 41 10/08/2021    GLUCOSE 129 10/08/2021       ASSESSMENT AND PLAN      1. Chronic heart failure with preserved ejection fraction ([address]    2. JOS (acute kidney injury) ([facility]    3.  COVID-19    4. Acute Respiratory failure with hypoxia: much improved    5. Hypertension Essential: episode of hypoglycemia today. Will monitor. Plans:  Continue Remdesivir. Continue PRN cough syrup  Continue to await final Cardiology recommendations.   Etiology of syncope my be due to poor oral intake. Continue to monitor renal function.   Discharge planning in am if okay or cleared by cardiology

## 2021-10-08 NOTE — PROGRESS NOTES
[facility]) Physicians        CARDIOLOGY                 INPATIENT PROGRESS NOTE          PATIENT SEEN IN FOLLOW UP FOR: Syncope    Hospital Day: 3     Alistair Roper is a 39year old patient known to Dr. Mccarty Purple: limited since Patient was not seen in person due to COVID-19 status    ROS: Review of rest of 10 systems limited since Patient was not seen in person due to COVID-19 status, to limit personal exposure and limit PPE utilization. OBJECTIVE: No acute distress. See Assessment     Diagnostics:       Telemetry: Reviewed, No tachyarrhythmias        Intake/Output Summary (Last 24 hours) at 10/8/2021 1337  Last data filed at 10/8/2021 0920  Gross per 24 hour   Intake 840 ml   Output 650 ml   Net 190 ml       Labs:   CBC:   Recent Labs     10/07/21  0532 10/08/21  0722   WBC 6.9 11.5   HGB 14.8 13.4   HCT 46.7 42.4    205     BMP:   Recent Labs     10/07/21  0532 10/08/21  0722    138   K 3.8 4.4   CO2 25 24   BUN 23* 27*   CREATININE 2.3* 2.1*   LABGLOM 37 41   CALCIUM 9.0 9.3     Mag: No results for input(s): MG in the last 72 hours. Phos: No results for input(s): PHOS in the last 72 hours. TSH: No results for input(s): TSH in the last 72 hours. HgA1c:     BNP: No results for input(s): BNP in the last 72 hours. PT/INR: No results for input(s): PROTIME, INR in the last 72 hours. APTT:No results for input(s): APTT in the last 72 hours. CARDIAC ENZYMES:No results for input(s): CKTOTAL, CKMB, CKMBINDEX, TROPONINI in the last 72 hours.   FASTING LIPID PANEL:  Lab Results   Component Value Date    CHOL 142 08/25/2021    HDL 29 08/25/2021    LDLCALC 62 08/25/2021    TRIG 257 08/25/2021     LIVER PROFILE:  Recent Labs     10/06/21  1835 10/08/21  0722   AST 25 21   ALT 19 15   LABALBU 4.0 3.5       Current Inpatient Medications:   aspirin  81 mg Oral Daily    carvedilol  25 mg Oral BID    ivabradine  5 mg Oral BID WC    famotidine  40 mg Oral QPM    hydrALAZINE 75 mg Oral 3 times per day    isosorbide mononitrate  120 mg Oral Daily    sodium chloride flush  10 mL IntraVENous 2 times per day    enoxaparin  40 mg SubCUTAneous Daily    dexamethasone  6 mg IntraVENous Q24H    vitamin C  1,000 mg Oral BID    zinc sulfate  50 mg Oral BID    Vitamin D  1,000 Units Oral Daily    remdesivir IVPB  100 mg IntraVENous Q24H       IV Infusions (if any):   sodium chloride           PHYSICAL EXAM:     CONSTITUTIONAL:   BP 98/60   Pulse 67   Temp 97.4 °F (36.3 °C) (Infrared)   Resp 16   Ht 5' 8\" (1.727 m)   Wt 214 lb (97.1 kg)   SpO2 91%   BMI 32.54 kg/m²   Pulse  Av.5  Min: 61  Max: 70  Systolic (96PSB), PWX:852 , Min:98 , FIC:451    Diastolic (95ZVB), ZZA:84, Min:60, Max:69      Patient was not seen in person due to COVID-19 status, to limit personal exposure and limit PPE utilization.           Impression/Recommendations:     Syncopal episode(unwitnessed) of unclear etiology in context of COVID-19 pneumonia - Monitor BP,  ICD will be interrogated to r/o VT/VF     H/o Nonischemic cardiopathy - (EF 20-25% in ) Improved, last EF  60% (Stress test  21) - s/p ICD - ICD will be interrogated to r/o VT/VF     S/p Single Chamber Implantable Cardioverter/Defibrillator ([employer])2017 by Valentino Espino MD     HTN,  Controlled - If BP low, decrease Coreg     JOS/CKD - Monitor renal Fn    JOLENE on CPAP                    No further cardiac testing  Cardiology will see as needed during weekend              Electronically signed by Fabiano Gleason MD on 10/8/2021 at [address] [place] [address] Cardiology

## 2021-10-08 NOTE — CARE COORDINATION
SS NOTE: COVID POSITIVE 10/6. Pt is on room air. Pt is on Remdesivir. Awaiting Cardiology consult and approval for possible dch. Monitoring pt's renal function. Pt continues to plan on returning home at [facility] and his son or pt's girlfriend will transport pt home at [facility]. SS to continue as needed. Abdi Holliday. 10/8/2021.4:35 PM.

## 2021-10-09 LAB
ALBUMIN SERPL-MCNC: 3.3 G/DL (ref 3.5–5.2)
ALP BLD-CCNC: 66 U/L (ref 40–129)
ALT SERPL-CCNC: 14 U/L (ref 0–40)
ANION GAP SERPL CALCULATED.3IONS-SCNC: 9 MMOL/L (ref 7–16)
AST SERPL-CCNC: 16 U/L (ref 0–39)
BASOPHILS ABSOLUTE: 0.01 E9/L (ref 0–0.2)
BASOPHILS RELATIVE PERCENT: 0.1 % (ref 0–2)
BILIRUB SERPL-MCNC: 0.3 MG/DL (ref 0–1.2)
BILIRUBIN DIRECT: <0.2 MG/DL (ref 0–0.3)
BILIRUBIN, INDIRECT: ABNORMAL MG/DL (ref 0–1)
BUN BLDV-MCNC: 28 MG/DL (ref 6–20)
C-REACTIVE PROTEIN: 6.9 MG/DL (ref 0–0.4)
CALCIUM SERPL-MCNC: 9.4 MG/DL (ref 8.6–10.2)
CHLORIDE BLD-SCNC: 104 MMOL/L (ref 98–107)
CO2: 25 MMOL/L (ref 22–29)
CREAT SERPL-MCNC: 1.9 MG/DL (ref 0.7–1.2)
EOSINOPHILS ABSOLUTE: 0 E9/L (ref 0.05–0.5)
EOSINOPHILS RELATIVE PERCENT: 0 % (ref 0–6)
GFR AFRICAN AMERICAN: 46
GFR NON-AFRICAN AMERICAN: 46 ML/MIN/1.73
GLUCOSE BLD-MCNC: 117 MG/DL (ref 74–99)
HCT VFR BLD CALC: 41.3 % (ref 37–54)
HEMOGLOBIN: 13.5 G/DL (ref 12.5–16.5)
IMMATURE GRANULOCYTES #: 0.07 E9/L
IMMATURE GRANULOCYTES %: 0.5 % (ref 0–5)
LYMPHOCYTES ABSOLUTE: 1.48 E9/L (ref 1.5–4)
LYMPHOCYTES RELATIVE PERCENT: 11.3 % (ref 20–42)
MCH RBC QN AUTO: 28.7 PG (ref 26–35)
MCHC RBC AUTO-ENTMCNC: 32.7 % (ref 32–34.5)
MCV RBC AUTO: 87.9 FL (ref 80–99.9)
MONOCYTES ABSOLUTE: 0.82 E9/L (ref 0.1–0.95)
MONOCYTES RELATIVE PERCENT: 6.3 % (ref 2–12)
NEUTROPHILS ABSOLUTE: 10.7 E9/L (ref 1.8–7.3)
NEUTROPHILS RELATIVE PERCENT: 81.8 % (ref 43–80)
PDW BLD-RTO: 14.2 FL (ref 11.5–15)
PLATELET # BLD: 258 E9/L (ref 130–450)
PMV BLD AUTO: 10.8 FL (ref 7–12)
POTASSIUM REFLEX MAGNESIUM: 4.6 MMOL/L (ref 3.5–5)
RBC # BLD: 4.7 E12/L (ref 3.8–5.8)
SODIUM BLD-SCNC: 138 MMOL/L (ref 132–146)
TOTAL PROTEIN: 6.9 G/DL (ref 6.4–8.3)
WBC # BLD: 13.1 E9/L (ref 4.5–11.5)

## 2021-10-09 PROCEDURE — 80048 BASIC METABOLIC PNL TOTAL CA: CPT

## 2021-10-09 PROCEDURE — 2500000003 HC RX 250 WO HCPCS: Performed by: INTERNAL MEDICINE

## 2021-10-09 PROCEDURE — 80076 HEPATIC FUNCTION PANEL: CPT

## 2021-10-09 PROCEDURE — 99232 SBSQ HOSP IP/OBS MODERATE 35: CPT | Performed by: INTERNAL MEDICINE

## 2021-10-09 PROCEDURE — 85025 COMPLETE CBC W/AUTO DIFF WBC: CPT

## 2021-10-09 PROCEDURE — 6360000002 HC RX W HCPCS: Performed by: INTERNAL MEDICINE

## 2021-10-09 PROCEDURE — 6370000000 HC RX 637 (ALT 250 FOR IP): Performed by: INTERNAL MEDICINE

## 2021-10-09 PROCEDURE — 36415 COLL VENOUS BLD VENIPUNCTURE: CPT

## 2021-10-09 PROCEDURE — 2060000000 HC ICU INTERMEDIATE R&B

## 2021-10-09 PROCEDURE — 2580000003 HC RX 258: Performed by: INTERNAL MEDICINE

## 2021-10-09 PROCEDURE — 99233 SBSQ HOSP IP/OBS HIGH 50: CPT | Performed by: INTERNAL MEDICINE

## 2021-10-09 RX ADMIN — DEXAMETHASONE SODIUM PHOSPHATE 6 MG: 10 INJECTION INTRAMUSCULAR; INTRAVENOUS at 06:06

## 2021-10-09 RX ADMIN — CARVEDILOL 25 MG: 25 TABLET, FILM COATED ORAL at 20:49

## 2021-10-09 RX ADMIN — BENZONATATE 100 MG: 100 CAPSULE ORAL at 10:37

## 2021-10-09 RX ADMIN — ZINC SULFATE 220 MG (50 MG) CAPSULE 50 MG: CAPSULE at 10:37

## 2021-10-09 RX ADMIN — ACETAMINOPHEN 650 MG: 325 TABLET ORAL at 20:49

## 2021-10-09 RX ADMIN — OXYCODONE HYDROCHLORIDE AND ACETAMINOPHEN 1000 MG: 500 TABLET ORAL at 20:48

## 2021-10-09 RX ADMIN — ASPIRIN 81 MG CHEWABLE TABLET 81 MG: 81 TABLET CHEWABLE at 10:37

## 2021-10-09 RX ADMIN — Medication 1000 UNITS: at 10:37

## 2021-10-09 RX ADMIN — OXYCODONE HYDROCHLORIDE AND ACETAMINOPHEN 1000 MG: 500 TABLET ORAL at 10:37

## 2021-10-09 RX ADMIN — SODIUM CHLORIDE, PRESERVATIVE FREE 10 ML: 5 INJECTION INTRAVENOUS at 20:49

## 2021-10-09 RX ADMIN — BENZONATATE 100 MG: 100 CAPSULE ORAL at 20:49

## 2021-10-09 RX ADMIN — HYDRALAZINE HYDROCHLORIDE 75 MG: 25 TABLET, FILM COATED ORAL at 17:27

## 2021-10-09 RX ADMIN — REMDESIVIR 100 MG: 100 INJECTION, POWDER, LYOPHILIZED, FOR SOLUTION INTRAVENOUS at 17:27

## 2021-10-09 RX ADMIN — IVABRADINE 5 MG: 5 TABLET, FILM COATED ORAL at 17:27

## 2021-10-09 RX ADMIN — SODIUM CHLORIDE, PRESERVATIVE FREE 10 ML: 5 INJECTION INTRAVENOUS at 10:38

## 2021-10-09 RX ADMIN — ZINC SULFATE 220 MG (50 MG) CAPSULE 50 MG: CAPSULE at 20:49

## 2021-10-09 RX ADMIN — CARVEDILOL 25 MG: 25 TABLET, FILM COATED ORAL at 10:38

## 2021-10-09 RX ADMIN — HYDRALAZINE HYDROCHLORIDE 75 MG: 25 TABLET, FILM COATED ORAL at 20:48

## 2021-10-09 RX ADMIN — ISOSORBIDE MONONITRATE 120 MG: 60 TABLET, EXTENDED RELEASE ORAL at 10:37

## 2021-10-09 RX ADMIN — FAMOTIDINE 40 MG: 20 TABLET, FILM COATED ORAL at 17:27

## 2021-10-09 RX ADMIN — IVABRADINE 5 MG: 5 TABLET, FILM COATED ORAL at 10:37

## 2021-10-09 RX ADMIN — HYDRALAZINE HYDROCHLORIDE 75 MG: 25 TABLET, FILM COATED ORAL at 06:06

## 2021-10-09 RX ADMIN — ENOXAPARIN SODIUM 40 MG: 40 INJECTION SUBCUTANEOUS at 10:38

## 2021-10-09 RX ADMIN — SODIUM CHLORIDE, PRESERVATIVE FREE 10 ML: 5 INJECTION INTRAVENOUS at 17:27

## 2021-10-09 ASSESSMENT — PAIN DESCRIPTION - PROGRESSION
CLINICAL_PROGRESSION: GRADUALLY IMPROVING
CLINICAL_PROGRESSION: GRADUALLY IMPROVING

## 2021-10-09 ASSESSMENT — PAIN DESCRIPTION - ONSET: ONSET: GRADUAL

## 2021-10-09 ASSESSMENT — PAIN - FUNCTIONAL ASSESSMENT: PAIN_FUNCTIONAL_ASSESSMENT: ACTIVITIES ARE NOT PREVENTED

## 2021-10-09 ASSESSMENT — PAIN SCALES - GENERAL
PAINLEVEL_OUTOF10: 0
PAINLEVEL_OUTOF10: 2

## 2021-10-09 ASSESSMENT — PAIN DESCRIPTION - PAIN TYPE: TYPE: ACUTE PAIN

## 2021-10-09 ASSESSMENT — PAIN DESCRIPTION - DESCRIPTORS: DESCRIPTORS: ACHING

## 2021-10-09 ASSESSMENT — PAIN DESCRIPTION - LOCATION: LOCATION: GENERALIZED

## 2021-10-09 ASSESSMENT — PAIN DESCRIPTION - FREQUENCY: FREQUENCY: INTERMITTENT

## 2021-10-09 NOTE — PROGRESS NOTES
INPATIENT CARDIOLOGY Follow UP    Name: Irene Mcelroy    Age: 39 y.o. Date of Admission: 10/6/2021  8:00 PM    Date of Service: 10/9/2021      Referring Physician: Mallory Russo MD      Subjective: This was a remote visit due to ongoing treatment for COVID-19 pneumonia              Past Medical History:  Past Medical History:   Diagnosis Date    AICD (automatic cardioverter/defibrillator) present     Asthma     CAD (coronary artery disease)     Cardiac LV ejection fraction 10-20%     [facility]     CKD (chronic kidney disease)     [facility]     COVID-19 10/06/2021    Depression     Diverticulitis     GERD (gastroesophageal reflux disease)     Hyperlipidemia     Hypertension     Nonischemic cardiomyopathy (HCC)     Panic attacks     Sleep apnea     uses Cpap nightly       Past Surgical History:  Past Surgical History:   Procedure Laterality Date    CARDIAC DEFIBRILLATOR PLACEMENT Left 01/27/2017    [employer] single lead ICD,     DIAGNOSTIC CARDIAC CATH LAB PROCEDURE  2015    Critical access hospital     MN COLONOSCOPY FLX DX W/COLLJ SPEC WHEN PFRMD N/A 9/6/2018    COLONOSCOPY performed by Rosy Becker MD at [facility] ENDOSCOPY       Family History:  Family History   Problem Relation Age of Onset    Cancer Father     No Known Problems Mother     Other Sister         Heart murmur       Social History:  Social History     Socioeconomic History    Marital status: Single     Spouse name: Not on file    Number of children: Not on file    Years of education: Not on file    Highest education level: Not on file   Occupational History    Not on file   Tobacco Use    Smoking status: Never Smoker    Smokeless tobacco: Never Used    Tobacco comment: Patient counseled to remain smoke free   Vaping Use    Vaping Use: Never used   Substance and Sexual Activity    Alcohol use: Yes     Comment: 2-3 times monthly.   coffee rarely     Drug use: Yes     Types: Marijuana     Comment: Occasionallu    Sexual activity: Yes     Partners: Female     Comment:  for 7 years   Other Topics Concern    Not on file   Social History Narrative    Not on file     Social Determinants of Health     Financial Resource Strain: Low Risk     Difficulty of Paying Living Expenses: Not hard at all   Food Insecurity: No Food Insecurity    Worried About Running Out of Food in the Last Year: Never true    Lexii of Food in the Last Year: Never true   Transportation Needs: No Transportation Needs    Lack of Transportation (Medical): No    Lack of Transportation (Non-Medical): No   Physical Activity:     Days of Exercise per Week:     Minutes of Exercise per Session:    Stress:     Feeling of Stress :    Social Connections:     Frequency of Communication with Friends and Family:     Frequency of Social Gatherings with Friends and Family:     Attends [employer] Services:     Active Member of Clubs or Organizations:     Attends Club or Organization Meetings:     Marital Status:    Intimate Partner Violence:     Fear of Current or Ex-Partner:     Emotionally Abused:     Physically Abused:     Sexually Abused: Allergies:  No Known Allergies    Home Medications:  Prior to Admission medications    Medication Sig Start Date End Date Taking?  Authorizing Provider   carvedilol (COREG) 25 MG tablet Take 1 tablet by mouth 2 times daily 8/25/21   Juju Harry MD   CORLANOR 5 MG TABS tablet TAKE ONE TABLET BY MOUTH TWICE A DAY WITH MEALS 8/20/21   Ally Mcdonald MD   hydrALAZINE (APRESOLINE) 25 MG tablet TAKE THREE (3) TABLETS BY MOUTH THREE TIMES DAILY 8/9/21   Hallie Rico MD   ASPIRIN LOW DOSE 81 MG chewable tablet CHEW AND SWALLOW  ONE TABLET BY MOUTH EVERY DAY 6/3/21   Hector Terry MD   CPAP Machine MISC by Does not apply route nightly    Historical Provider, MD   furosemide (LASIX) 40 MG tablet TAKE ONE TABLET BY MOUTH EVERY DAY 4/19/21   Cecile Molina MD   famotidine (PEPCID) 40 MG tablet Take 1 tablet by mouth every evening 4/6/21   Guanakito Suarez DO   spironolactone (ALDACTONE) 25 MG tablet Take 0.5 tablets by mouth daily 2/17/21   Cecile Molina MD   isosorbide mononitrate (IMDUR) 120 MG extended release tablet TAKE ONE TABLET BY MOUTH EVERY DAY 11/2/20   Cecile Molina MD   albuterol sulfate HFA (PROVENTIL HFA) 108 (90 BASE) MCG/ACT inhaler Inhale 2 puffs into the lungs every 6 hours as needed for Wheezing 3/16/16 5/4/23  Tucker Greenfield DO       Current Medications:  Current Facility-Administered Medications   Medication Dose Route Frequency Provider Last Rate Last Admin    albuterol sulfate  (90 Base) MCG/ACT inhaler 2 puff  2 puff Inhalation Q6H PRN El Ngo MD        aspirin chewable tablet 81 mg  81 mg Oral Daily Jahaira Muñoz MD   81 mg at 10/09/21 1037    carvedilol (COREG) tablet 25 mg  25 mg Oral BID Jahaira Muñoz MD   25 mg at 10/09/21 1038    ivabradine (CORLANOR) tablet 5 mg  5 mg Oral BID WC Jahaira Muñoz MD   5 mg at 10/09/21 1037    famotidine (PEPCID) tablet 40 mg  40 mg Oral QPM Jahaira Muñoz MD   40 mg at 10/08/21 1654    hydrALAZINE (APRESOLINE) tablet 75 mg  75 mg Oral 3 times per day El Ngo MD   75 mg at 10/09/21 0606    isosorbide mononitrate (IMDUR) extended release tablet 120 mg  120 mg Oral Daily Jahaira Muñoz MD   120 mg at 10/09/21 1037    sodium chloride flush 0.9 % injection 10 mL  10 mL IntraVENous 2 times per day El Ngo MD   10 mL at 10/09/21 1038    sodium chloride flush 0.9 % injection 10 mL  10 mL IntraVENous PRN Jahaira Muñoz MD        0.9 % sodium chloride infusion  25 mL IntraVENous PRN Jahaira Muñoz MD        polyethylene glycol (GLYCOLAX) packet 17 g  17 g Oral Daily PRN Jahaira Muñoz MD        acetaminophen (TYLENOL) tablet 650 mg  650 mg Oral Q6H PRN El Ngo MD        Or    acetaminophen (TYLENOL) suppository 650 mg  650 mg Rectal Q6H PRN Jahaira Muñoz MD        enoxaparin (LOVENOX) injection 40 mg  40 mg SubCUTAneous Daily Jahaira Muñoz MD   40 mg at 10/09/21 1038    dexamethasone (DECADRON) injection 6 mg  6 mg IntraVENous Q24H Jahaira Muñoz MD   6 mg at 10/09/21 0606    ascorbic acid (VITAMIN C) tablet 1,000 mg  1,000 mg Oral BID Jahaira Muñoz MD   1,000 mg at 10/09/21 1037    zinc sulfate (ZINCATE) capsule 50 mg  50 mg Oral BID Jahaira Muñoz MD   50 mg at 10/09/21 1037    benzonatate (TESSALON) capsule 100 mg  100 mg Oral TID PRN Poonam Nielsen MD   100 mg at 10/09/21 1037    Vitamin D (CHOLECALCIFEROL) tablet 1,000 Units  1,000 Units Oral Daily Jahaira Muñoz MD   1,000 Units at 10/09/21 1037    remdesivir 100 mg in sodium chloride 0.9 % 250 mL IVPB  100 mg IntraVENous Q24H Sarah Bowens MD   Stopped at 10/08/21 1725    0.9 % sodium chloride bolus  30 mL IntraVENous PRN Sarah Bowens MD        guaiFENesin-dextromethorphan (ROBITUSSIN DM) 100-10 MG/5ML syrup 5 mL  5 mL Oral Q4H PRN Sarah Bowens MD   5 mL at 10/07/21 1750      sodium chloride         Physical Exam:  /62   Pulse 68   Temp 97.9 °F (36.6 °C) (Infrared)   Resp 16   Ht 5' 8\" (1.727 m)   Wt 214 lb (97.1 kg)   SpO2 92%   BMI 32.54 kg/m²   Wt Readings from Last 3 Encounters:   10/06/21 214 lb (97.1 kg)   09/13/21 212 lb (96.2 kg)   09/02/21 217 lb 6.4 oz (98.6 kg)           Intake/Output:    Intake/Output Summary (Last 24 hours) at 10/9/2021 1424  Last data filed at 10/9/2021 0908  Gross per 24 hour   Intake 480 ml   Output --   Net 480 ml     I/O this shift:  In: 180 [P.O.:180]  Out: -     Laboratory Tests:  Recent Labs     10/06/21  1835 10/06/21  1835 10/07/21  0532 10/08/21  0722 10/09/21  0518      < > 137 138 138   K 3.9   < > 3.8 4.4 4.6      < > 102 105 104   CO2 27   < > 25 24 25   BUN 21*   < > 23* 27* 28*   CREATININE 2.6*  --  2.3* 2.1* 1.9*   GLUCOSE 109*   < > 105* 129* 117* CALCIUM 9.4   < > 9.0 9.3 9.4    < > = values in this interval not displayed. Lab Results   Component Value Date    MG 2.0 08/24/2021    MG 2.1 01/27/2017     Recent Labs     10/06/21  1835 10/08/21  0722 10/09/21  0518   ALKPHOS 69 61 66   ALT 19 15 14   AST 25 21 16   PROT 7.7 7.0 6.9   BILITOT 0.5 0.3 0.3   BILIDIR  --  <0.2 <0.2   LABALBU 4.0 3.5 3.3*     Recent Labs     10/07/21  0532 10/08/21  0722 10/09/21  0518   WBC 6.9 11.5 13.1*   RBC 5.24 4.76 4.70   HGB 14.8 13.4 13.5   HCT 46.7 42.4 41.3   MCV 89.1 89.1 87.9   MCH 28.2 28.2 28.7   MCHC 31.7* 31.6* 32.7   RDW 14.0 14.0 14.2    205 258   MPV 10.5 10.7 10.8     Lab Results   Component Value Date    CKTOTAL 535 (H) 08/25/2021    CKMB 2.8 08/25/2021     No results for input(s): CKTOTAL, CKMB, CKMBINDEX, TROPHS in the last 72 hours. Lab Results   Component Value Date    INR 1.1 01/28/2017    PROTIME 12.2 01/28/2017     Lab Results   Component Value Date    TSH 1.070 08/27/2015    TSH 1.070 01/16/2015     Lab Results   Component Value Date    LABA1C 5.9 08/27/2015    LABA1C 6.2 (H) 01/16/2015     No results found for: EAG  Lab Results   Component Value Date    CHOL 142 08/25/2021    CHOL 140 08/25/2021    CHOL 199 10/03/2017     Lab Results   Component Value Date    TRIG 257 (H) 08/25/2021    TRIG 258 (H) 08/25/2021    TRIG 132 10/03/2017     Lab Results   Component Value Date    HDL 29 08/25/2021    HDL 28 08/25/2021    HDL 39 10/03/2017     Lab Results   Component Value Date    LDLCALC 62 08/25/2021    LDLCALC 60 08/25/2021    LDLCALC 134 (H) 10/03/2017     Lab Results   Component Value Date    LABVLDL 51 08/25/2021    LABVLDL 52 08/25/2021    LABVLDL 26 10/03/2017     No results found for: CHOLHDLRATIO  No results for input(s): PROBNP in the last 72 hours. Cardiac Tests:      Echocardiogram reviewed: September 2020     Left Ventricle   Left ventricular internal dimensions were normal in diastole and systole.    Mild left ventricular concentric hypertrophy noted. No regional wall motion abnormalities seen. Normal left ventricular ejection fraction. Ejection fraction is visually estimated at 65%. There is doppler evidence of stage I diastolic dysfunction. Stress test reviewed:  5/2021  Impression:     1. Electrocardiographically normal regadenoson infusion with a   clinically non-ischemic response when compared to the baseline   ECG   2. Myocardial perfusion imaging was normal with attenuation   artifact.     3. Overall left ventricular systolic function was normal without   regional wall motion abnormalities. 4.   Low risk general pharmacologic stress test.     Cardiac catheterization reviewed:     CXR reviewed: The 10-year ASCVD risk score (Cesar Rodriguez, et al., 2013) is: 5.5%    Values used to calculate the score:      Age: 39 years      Sex: Male      Is Non- : Yes      Diabetic: No      Tobacco smoker: No      Systolic Blood Pressure: 285 mmHg      Is BP treated: Yes      HDL Cholesterol: 29 mg/dL      Total Cholesterol: 142 mg/dL    ASSESSMENT / PLAN:    1. JOS (acute kidney injury) (HCC)/chronic kidney disease  Management per primary and nephrology  2. Syncope and collapse  Patient will need device interrogation to rule out arrhythmia mediated. Continue beta-blocker  3. COVID  Management per primary service and infectious disease as well as pulmonology    4. History of nonischemic cardiomyopathy with prior EF noted to be 20 to 25% in 2016 status post ICD around that time. EF improved with guideline directed therapy to 60% on echo on May 2021.    5.  Hypertension    6. Sleep apnea  On CPAP              Thank you for allowing me to participate in your patient's care. Please feel free to contact me if you have any questions or concerns.     Lilliam Kent MD  [facility]) Cardiology

## 2021-10-09 NOTE — PROGRESS NOTES
Department of Internal Medicine  General Internal Medicine  Attending Progress Note  Chief Complaint   Patient presents with    Loss of Consciousness     dizziness     SUBJECTIVE:    Reports that he feels fine. No fever or chill. No chest pain. Aware of plans to interrogate his ICD before discharge.      OBJECTIVE      Medications    Current Facility-Administered Medications: albuterol sulfate  (90 Base) MCG/ACT inhaler 2 puff, 2 puff, Inhalation, Q6H PRN  aspirin chewable tablet 81 mg, 81 mg, Oral, Daily  carvedilol (COREG) tablet 25 mg, 25 mg, Oral, BID  ivabradine (CORLANOR) tablet 5 mg, 5 mg, Oral, BID WC  famotidine (PEPCID) tablet 40 mg, 40 mg, Oral, QPM  hydrALAZINE (APRESOLINE) tablet 75 mg, 75 mg, Oral, 3 times per day  isosorbide mononitrate (IMDUR) extended release tablet 120 mg, 120 mg, Oral, Daily  sodium chloride flush 0.9 % injection 10 mL, 10 mL, IntraVENous, 2 times per day  sodium chloride flush 0.9 % injection 10 mL, 10 mL, IntraVENous, PRN  0.9 % sodium chloride infusion, 25 mL, IntraVENous, PRN  polyethylene glycol (GLYCOLAX) packet 17 g, 17 g, Oral, Daily PRN  acetaminophen (TYLENOL) tablet 650 mg, 650 mg, Oral, Q6H PRN **OR** acetaminophen (TYLENOL) suppository 650 mg, 650 mg, Rectal, Q6H PRN  enoxaparin (LOVENOX) injection 40 mg, 40 mg, SubCUTAneous, Daily  dexamethasone (DECADRON) injection 6 mg, 6 mg, IntraVENous, Q24H  ascorbic acid (VITAMIN C) tablet 1,000 mg, 1,000 mg, Oral, BID  zinc sulfate (ZINCATE) capsule 50 mg, 50 mg, Oral, BID  benzonatate (TESSALON) capsule 100 mg, 100 mg, Oral, TID PRN  Vitamin D (CHOLECALCIFEROL) tablet 1,000 Units, 1,000 Units, Oral, Daily  [COMPLETED] remdesivir 200 mg in sodium chloride 0.9 % 250 mL IVPB, 200 mg, IntraVENous, Once **FOLLOWED BY** remdesivir 100 mg in sodium chloride 0.9 % 250 mL IVPB, 100 mg, IntraVENous, Q24H  0.9 % sodium chloride bolus, 30 mL, IntraVENous, PRN  guaiFENesin-dextromethorphan (ROBITUSSIN DM) 100-10 MG/5ML syrup 5 mL, 5 mL, Oral, Q4H PRN  Physical    VITALS:  /62   Pulse 68   Temp 97.9 °F (36.6 °C) (Infrared)   Resp 16   Ht 5' 8\" (1.727 m)   Wt 214 lb (97.1 kg)   SpO2 92%   BMI 32.54 kg/m²   CONSTITUTIONAL:  awake, alert, cooperative, no apparent distress, and appears stated age  EYES:  Lids and lashes normal, pupils equal, round and reactive to light, extra ocular muscles intact, sclera clear, conjunctiva normal  ENT:  normocepalic, without obvious abnormality  NECK:  supple, symmetrical, trachea midline  LUNGS:  good air exchange and clear to auscultation  CARDIOVASCULAR:  normal apical pulses and normal S1 and S2  ABDOMEN:  Active bowel sounds, NTND  MUSCULOSKELETAL:  tone is normal  NEUROLOGIC:  Awake, alert, oriented to name, place and time. Cranial nerves II-XII are grossly intact. Motor is 5 out of 5 bilaterally. Cerebellar finger to nose, heel to shin intact. Sensory is intact. Babinski down going, Romberg negative, and gait is normal.  SKIN:  no bruising or bleeding  Data    CBC:   Lab Results   Component Value Date    WBC 13.1 10/09/2021    RBC 4.70 10/09/2021    HGB 13.5 10/09/2021    HCT 41.3 10/09/2021    MCV 87.9 10/09/2021    MCH 28.7 10/09/2021    MCHC 32.7 10/09/2021    RDW 14.2 10/09/2021     10/09/2021    MPV 10.8 10/09/2021     BMP:    Lab Results   Component Value Date     10/09/2021    K 4.6 10/09/2021     10/09/2021    CO2 25 10/09/2021    BUN 28 10/09/2021    LABALBU 3.3 10/09/2021    CREATININE 1.9 10/09/2021    CALCIUM 9.4 10/09/2021    GFRAA 46 10/09/2021    LABGLOM 46 10/09/2021    GLUCOSE 117 10/09/2021       ASSESSMENT AND PLAN      1. JOLENE on CPAP    2. Chronic heart failure with preserved ejection fraction ([facility]    3. JOS (acute kidney injury) ([facility]    4.  COVID-19    Plans:  Continue remdesivir  Discharge if able to interrogate patient's ICD  Continue to monitor  Ambulatory Pulse ox and discharge pending ICD.

## 2021-10-10 VITALS
WEIGHT: 214 LBS | OXYGEN SATURATION: 94 % | SYSTOLIC BLOOD PRESSURE: 142 MMHG | TEMPERATURE: 97.9 F | RESPIRATION RATE: 16 BRPM | HEIGHT: 68 IN | DIASTOLIC BLOOD PRESSURE: 83 MMHG | BODY MASS INDEX: 32.43 KG/M2 | HEART RATE: 63 BPM

## 2021-10-10 LAB
ALBUMIN SERPL-MCNC: 3.3 G/DL (ref 3.5–5.2)
ALP BLD-CCNC: 67 U/L (ref 40–129)
ALT SERPL-CCNC: 14 U/L (ref 0–40)
ANION GAP SERPL CALCULATED.3IONS-SCNC: 8 MMOL/L (ref 7–16)
AST SERPL-CCNC: 12 U/L (ref 0–39)
BASOPHILS ABSOLUTE: 0.01 E9/L (ref 0–0.2)
BASOPHILS RELATIVE PERCENT: 0.1 % (ref 0–2)
BILIRUB SERPL-MCNC: 0.3 MG/DL (ref 0–1.2)
BILIRUBIN DIRECT: <0.2 MG/DL (ref 0–0.3)
BILIRUBIN, INDIRECT: ABNORMAL MG/DL (ref 0–1)
BUN BLDV-MCNC: 32 MG/DL (ref 6–20)
CALCIUM SERPL-MCNC: 9.2 MG/DL (ref 8.6–10.2)
CHLORIDE BLD-SCNC: 104 MMOL/L (ref 98–107)
CO2: 25 MMOL/L (ref 22–29)
CREAT SERPL-MCNC: 1.9 MG/DL (ref 0.7–1.2)
EOSINOPHILS ABSOLUTE: 0 E9/L (ref 0.05–0.5)
EOSINOPHILS RELATIVE PERCENT: 0 % (ref 0–6)
GFR AFRICAN AMERICAN: 46
GFR NON-AFRICAN AMERICAN: 46 ML/MIN/1.73
GLUCOSE BLD-MCNC: 116 MG/DL (ref 74–99)
HCT VFR BLD CALC: 41.7 % (ref 37–54)
HEMOGLOBIN: 13.2 G/DL (ref 12.5–16.5)
IMMATURE GRANULOCYTES #: 0.1 E9/L
IMMATURE GRANULOCYTES %: 0.7 % (ref 0–5)
LYMPHOCYTES ABSOLUTE: 1.46 E9/L (ref 1.5–4)
LYMPHOCYTES RELATIVE PERCENT: 10.5 % (ref 20–42)
MCH RBC QN AUTO: 28.3 PG (ref 26–35)
MCHC RBC AUTO-ENTMCNC: 31.7 % (ref 32–34.5)
MCV RBC AUTO: 89.3 FL (ref 80–99.9)
MONOCYTES ABSOLUTE: 0.65 E9/L (ref 0.1–0.95)
MONOCYTES RELATIVE PERCENT: 4.7 % (ref 2–12)
NEUTROPHILS ABSOLUTE: 11.73 E9/L (ref 1.8–7.3)
NEUTROPHILS RELATIVE PERCENT: 84 % (ref 43–80)
PDW BLD-RTO: 14.5 FL (ref 11.5–15)
PLATELET # BLD: 309 E9/L (ref 130–450)
PMV BLD AUTO: 10.6 FL (ref 7–12)
POTASSIUM REFLEX MAGNESIUM: 4.7 MMOL/L (ref 3.5–5)
RBC # BLD: 4.67 E12/L (ref 3.8–5.8)
SODIUM BLD-SCNC: 137 MMOL/L (ref 132–146)
TOTAL PROTEIN: 6.6 G/DL (ref 6.4–8.3)
WBC # BLD: 14 E9/L (ref 4.5–11.5)

## 2021-10-10 PROCEDURE — 2580000003 HC RX 258: Performed by: INTERNAL MEDICINE

## 2021-10-10 PROCEDURE — 36415 COLL VENOUS BLD VENIPUNCTURE: CPT

## 2021-10-10 PROCEDURE — 6370000000 HC RX 637 (ALT 250 FOR IP): Performed by: INTERNAL MEDICINE

## 2021-10-10 PROCEDURE — 80048 BASIC METABOLIC PNL TOTAL CA: CPT

## 2021-10-10 PROCEDURE — 85025 COMPLETE CBC W/AUTO DIFF WBC: CPT

## 2021-10-10 PROCEDURE — 99239 HOSP IP/OBS DSCHRG MGMT >30: CPT | Performed by: INTERNAL MEDICINE

## 2021-10-10 PROCEDURE — 80076 HEPATIC FUNCTION PANEL: CPT

## 2021-10-10 PROCEDURE — 99233 SBSQ HOSP IP/OBS HIGH 50: CPT | Performed by: INTERNAL MEDICINE

## 2021-10-10 PROCEDURE — 6360000002 HC RX W HCPCS: Performed by: INTERNAL MEDICINE

## 2021-10-10 RX ORDER — ZINC SULFATE 50(220)MG
50 CAPSULE ORAL 2 TIMES DAILY
Qty: 30 CAPSULE | Refills: 3 | COMMUNITY
Start: 2021-10-10

## 2021-10-10 RX ORDER — CHOLECALCIFEROL (VITAMIN D3) 25 MCG
1000 TABLET ORAL DAILY
Qty: 60 TABLET | Refills: 0 | Status: SHIPPED | OUTPATIENT
Start: 2021-10-11

## 2021-10-10 RX ORDER — BENZONATATE 100 MG/1
100 CAPSULE ORAL 3 TIMES DAILY PRN
Qty: 21 CAPSULE | Refills: 0 | Status: SHIPPED | OUTPATIENT
Start: 2021-10-10 | End: 2021-10-17

## 2021-10-10 RX ORDER — DEXAMETHASONE 0.5 MG/1
6 TABLET ORAL
Qty: 72 TABLET | Refills: 0 | Status: SHIPPED | OUTPATIENT
Start: 2021-10-10 | End: 2021-10-16

## 2021-10-10 RX ORDER — GUAIFENESIN/DEXTROMETHORPHAN 100-10MG/5
5 SYRUP ORAL EVERY 4 HOURS PRN
Qty: 120 ML | Refills: 0 | Status: SHIPPED | OUTPATIENT
Start: 2021-10-10 | End: 2021-10-20

## 2021-10-10 RX ADMIN — CARVEDILOL 25 MG: 25 TABLET, FILM COATED ORAL at 10:30

## 2021-10-10 RX ADMIN — ZINC SULFATE 220 MG (50 MG) CAPSULE 50 MG: CAPSULE at 10:31

## 2021-10-10 RX ADMIN — ISOSORBIDE MONONITRATE 120 MG: 60 TABLET, EXTENDED RELEASE ORAL at 10:30

## 2021-10-10 RX ADMIN — Medication 1000 UNITS: at 10:30

## 2021-10-10 RX ADMIN — HYDRALAZINE HYDROCHLORIDE 75 MG: 25 TABLET, FILM COATED ORAL at 06:25

## 2021-10-10 RX ADMIN — ASPIRIN 81 MG CHEWABLE TABLET 81 MG: 81 TABLET CHEWABLE at 10:30

## 2021-10-10 RX ADMIN — SODIUM CHLORIDE, PRESERVATIVE FREE 10 ML: 5 INJECTION INTRAVENOUS at 10:32

## 2021-10-10 RX ADMIN — IVABRADINE 5 MG: 5 TABLET, FILM COATED ORAL at 10:30

## 2021-10-10 RX ADMIN — ENOXAPARIN SODIUM 40 MG: 40 INJECTION SUBCUTANEOUS at 10:31

## 2021-10-10 RX ADMIN — OXYCODONE HYDROCHLORIDE AND ACETAMINOPHEN 1000 MG: 500 TABLET ORAL at 10:31

## 2021-10-10 RX ADMIN — DEXAMETHASONE SODIUM PHOSPHATE 6 MG: 10 INJECTION INTRAMUSCULAR; INTRAVENOUS at 06:25

## 2021-10-10 ASSESSMENT — PAIN SCALES - GENERAL
PAINLEVEL_OUTOF10: 0
PAINLEVEL_OUTOF10: 0

## 2021-10-10 NOTE — DISCHARGE SUMMARY
Physician Discharge Summary     Patient ID:  Kalyan Olivia  [id number]  39 y.o.  1975    Admit date: 10/6/2021    Discharge date and time: No discharge date for patient encounter. Admitting Physician: Vinayak Justice MD     Discharge Physician: Malini Page [facility]    Admission Diagnoses: Syncope and collapse [R55]  JOS (acute kidney injury) ([address] [N17.9]  COVID [U07.1]    Discharge Diagnoses:   1. COVID viral infection  2. Acute Respiratory failure with Hypoxia  3. Syncope  4. Hypertension Essential  5. Hx of AICD  6. Acute on Chronic kidney disease  7. Hx of congestive heart failure ( diastolic dysfunction). Admission Condition: fair    Discharged Condition: good    Indication for Admission:     Hospital Course:   Patient was admitted with syncope. He was noted to be covid positive. He was treated with Remdesivir and Decadron and supplemental oxygen. He has AICD. There was concern for VT, but this was interrogated and cleared by cardiology before discharge. Patient's Renal function improved after 24-48 hours of IV fluid. He was discharged in stable conditions  Patient pulse ox both at rest and during ambulation was greater than 92%. Time spent in discharge of patient is greater than 30 minutes. Consults: cardiology    Significant Diagnostic Studies: labs:     Treatments: IV hydration and Remdesivir    Discharge Exam:  BP (!) 142/83   Pulse 63   Temp 97.9 °F (36.6 °C) (Infrared)   Resp 16   Ht 5' 8\" (1.727 m)   Wt 214 lb (97.1 kg)   SpO2 94%   BMI 32.54 kg/m²   General appearance: alert, appears stated age and cooperative  Head: Normocephalic, without obvious abnormality, atraumatic  Eyes: conjunctivae/corneas clear. PERRL, EOM's intact. Fundi benign. Ears: normal TM's and external ear canals both ears  Nose: Nares normal. Septum midline. Mucosa normal. No drainage or sinus tenderness.   Lungs: clear to auscultation bilaterally  Heart: regular rate and rhythm, S1, S2 normal, no murmur, click, rub or gallop  Abdomen: soft, non-tender; bowel sounds normal; no masses,  no organomegaly  Extremities: extremities normal, atraumatic, no cyanosis or edema  Pulses: 2+ and symmetric    Disposition: home    In process/preliminary results:  Outstanding Order Results     No orders found from 9/7/2021 to 10/7/2021. Patient Instructions:   Current Discharge Medication List      START taking these medications    Details   benzonatate (TESSALON) 100 MG capsule Take 1 capsule by mouth 3 times daily as needed for Cough  Qty: 21 capsule, Refills: 0      guaiFENesin-dextromethorphan (ROBITUSSIN DM) 100-10 MG/5ML syrup Take 5 mLs by mouth every 4 hours as needed for Cough  Qty: 120 mL, Refills: 0      ascorbic acid (VITAMIN C) 1000 MG tablet Take 0.5 tablets by mouth 2 times daily  Qty: 30 tablet, Refills: 3      Cholecalciferol (VITAMIN D) 25 MCG TABS Take 1 tablet by mouth daily  Qty: 60 tablet, Refills: 0    Comments: Labeling may look different. 25 mcg=1000 Units. Please double check dosages.       zinc sulfate (ZINCATE) 220 (50 Zn) MG capsule Take 1 capsule by mouth 2 times daily  Qty: 30 capsule, Refills: 3      dexamethasone (DECADRON) 0.5 MG tablet Take 12 tablets by mouth daily (with breakfast) for 6 days  Qty: 72 tablet, Refills: 0         CONTINUE these medications which have NOT CHANGED    Details   carvedilol (COREG) 25 MG tablet Take 1 tablet by mouth 2 times daily  Qty: 180 tablet, Refills: 3      CORLANOR 5 MG TABS tablet TAKE ONE TABLET BY MOUTH TWICE A DAY WITH MEALS  Qty: 180 tablet, Refills: 1      hydrALAZINE (APRESOLINE) 25 MG tablet TAKE THREE (3) TABLETS BY MOUTH THREE TIMES DAILY  Qty: 270 tablet, Refills: 3      ASPIRIN LOW DOSE 81 MG chewable tablet CHEW AND SWALLOW  ONE TABLET BY MOUTH EVERY DAY  Qty: 90 tablet, Refills: 3      CPAP Machine MISC by Does not apply route nightly      furosemide (LASIX) 40 MG tablet TAKE ONE TABLET BY MOUTH EVERY DAY  Qty: 90 tablet, Refills: 3      famotidine (PEPCID) 40 MG tablet Take 1 tablet by mouth every evening  Qty: 90 tablet, Refills: 1    Associated Diagnoses: Gastroesophageal reflux disease, unspecified whether esophagitis present      isosorbide mononitrate (IMDUR) 120 MG extended release tablet TAKE ONE TABLET BY MOUTH EVERY DAY  Qty: 90 tablet, Refills: 3      albuterol sulfate HFA (PROVENTIL HFA) 108 (90 BASE) MCG/ACT inhaler Inhale 2 puffs into the lungs every 6 hours as needed for Wheezing  Qty: 1 Inhaler, Refills: 0         STOP taking these medications       spironolactone (ALDACTONE) 25 MG tablet Comments:   Reason for Stopping:             Activity: activity as tolerated  Diet: renal diet  Wound Care: none needed    Follow-up with PCP in 4 weeks.     SignedChasidy Taylor Ofungwu  10/10/2021  12:34 PM

## 2021-10-10 NOTE — PROGRESS NOTES
INPATIENT CARDIOLOGY Follow UP    Name: Janelle Giles    Age: 39 y.o. Date of Admission: 10/6/2021  8:00 PM    Date of Service: 10/10/2021      Referring Physician: Mone Phillips MD      Subjective: This was a remote visit due to ongoing treatment for COVID-19 pneumonia    Device interrogation report reviewed and there was no evidence of new arrhythmias around the time of symptoms that brought him to the emergency room.           Past Medical History:  Past Medical History:   Diagnosis Date    AICD (automatic cardioverter/defibrillator) present     Asthma     CAD (coronary artery disease)     Cardiac LV ejection fraction 10-20%     CHF (congestive heart failure) (HCC)     CKD (chronic kidney disease)     COPD (chronic obstructive pulmonary disease) ([facility] [address]     COVID-19 10/06/2021    Depression     Diverticulitis     GERD (gastroesophageal reflux disease)     Hyperlipidemia     Hypertension     Nonischemic cardiomyopathy (HCC)     Panic attacks     Sleep apnea     uses Cpap nightly       Past Surgical History:  Past Surgical History:   Procedure Laterality Date    CARDIAC DEFIBRILLATOR PLACEMENT Left 01/27/2017    [employer] single lead ICD,     DIAGNOSTIC [facility]ARDIAC CATH LAB PROCEDURE  2015    [facility]     NE COLONOSCOPY FLX DX W/COLLJ SPEC WHEN PFRMD N/A 9/6/2018    COLONOSCOPY performed by Pat Turcios MD at [facility]       Family History:  Family History   Problem Relation Age of Onset    Cancer Father     No Known Problems Mother     Other Sister         Heart murmur       Social History:  Social History     Socioeconomic History    Marital status: Single     Spouse name: Not on file    Number of children: Not on file    Years of education: Not on file    Highest education level: Not on file   Occupational History    Not on file   Tobacco Use    Smoking status: Never Smoker    Smokeless tobacco: Never Used    Tobacco comment: Patient counseled to remain smoke free   Vaping Use    Vaping Use: Never used   Substance and Sexual Activity    Alcohol use: Yes     Comment: 2-3 times monthly. coffee rarely     Drug use: Yes     Types: Marijuana     Comment: Occasionallu    Sexual activity: Yes     Partners: Female     Comment:  for 7 years   Other Topics Concern    Not on file   Social History Narrative    Not on file     Social Determinants of Health     Financial Resource Strain: Low Risk     Difficulty of Paying Living Expenses: Not hard at all   Food Insecurity: No Food Insecurity    Worried About Running Out of Food in the Last Year: Never true    [address] in the Last Year: Never true   Transportation Needs: No Transportation Needs    Lack of Transportation (Medical): No    Lack of Transportation (Non-Medical): No   Physical Activity:     Days of Exercise per Week:     Minutes of Exercise per Session:    Stress:     Feeling of Stress :    Social Connections:     Frequency of Communication with Friends and Family:     Frequency of Social Gatherings with Friends and Family:     Attends [employer] Services:     Active Member of Clubs or Organizations:     Attends Club or Organization Meetings:     Marital Status:    Intimate Partner Violence:     Fear of Current or Ex-Partner:     Emotionally Abused:     Physically Abused:     Sexually Abused: Allergies:  No Known Allergies    Home Medications:  Prior to Admission medications    Medication Sig Start Date End Date Taking?  Authorizing Provider   benzonatate (TESSALON) 100 MG capsule Take 1 capsule by mouth 3 times daily as needed for Cough 10/10/21 10/17/21 Yes Cheryl Duckworth MD   guaiFENesin-dextromethorphan (ROBITUSSIN DM) 100-10 MG/5ML syrup Take 5 mLs by mouth every 4 hours as needed for Cough 10/10/21 10/20/21 Yes Cheryl Duckworth MD   ascorbic acid (VITAMIN C) 1000 MG tablet Take 0.5 tablets by mouth 2 times daily 10/10/21  Yes Cheryl Duckworth MD Cholecalciferol (VITAMIN D) 25 MCG TABS Take 1 tablet by mouth daily 10/11/21  Yes Chantel Augustin MD   zinc sulfate (ZINCATE) 220 (50 Zn) MG capsule Take 1 capsule by mouth 2 times daily 10/10/21  Yes Chantel Augustin MD   dexamethasone (DECADRON) 0.5 MG tablet Take 12 tablets by mouth daily (with breakfast) for 6 days 10/10/21 10/16/21 Yes Chantel Augustin MD   carvedilol (COREG) 25 MG tablet Take 1 tablet by mouth 2 times daily 8/25/21   Mary Mckeon MD   CORLANOR 5 MG TABS tablet TAKE ONE TABLET BY MOUTH TWICE A DAY WITH MEALS 8/20/21   Bryant Ortiz MD   hydrALAZINE (APRESOLINE) 25 MG tablet TAKE THREE (3) TABLETS BY MOUTH THREE TIMES DAILY 8/9/21   Maximiliano Pearson MD   ASPIRIN LOW DOSE 81 MG chewable tablet CHEW AND SWALLOW  ONE TABLET BY MOUTH EVERY DAY 6/3/21   Murphy Ken MD   CPAP Machine MISC by Does not apply route nightly    Historical Provider, MD   furosemide (LASIX) 40 MG tablet TAKE ONE TABLET BY MOUTH EVERY DAY 4/19/21   Murphy Ken MD   famotidine (PEPCID) 40 MG tablet Take 1 tablet by mouth every evening 4/6/21   Helder Butcher DO   isosorbide mononitrate (IMDUR) 120 MG extended release tablet TAKE ONE TABLET BY MOUTH EVERY DAY 11/2/20   Murphy Ken MD   albuterol sulfate HFA (PROVENTIL HFA) 108 (90 BASE) MCG/ACT inhaler Inhale 2 puffs into the lungs every 6 hours as needed for Wheezing 3/16/16 5/4/23  Philippe Young DO       Current Medications:  Current Facility-Administered Medications   Medication Dose Route Frequency Provider Last Rate Last Admin    albuterol sulfate  (90 Base) MCG/ACT inhaler 2 puff  2 puff Inhalation Q6H PRN Jahaira Muñoz MD        aspirin chewable tablet 81 mg  81 mg Oral Daily Jahaira Muñoz MD   81 mg at 10/10/21 1030    carvedilol (COREG) tablet 25 mg  25 mg Oral BID Jahaira Muñoz MD   25 mg at 10/10/21 1030    ivabradine (CORLANOR) tablet 5 mg  5 mg Oral BID  Jahaira Muñoz MD   5 mg at 10/10/21 5054    famotidine (PEPCID) tablet 40 mg  40 mg Oral QPM Jahaira Muñoz MD   40 mg at 10/09/21 1727    hydrALAZINE (APRESOLINE) tablet 75 mg  75 mg Oral 3 times per day Danny Jo MD   75 mg at 10/10/21 9561    isosorbide mononitrate (IMDUR) extended release tablet 120 mg  120 mg Oral Daily Jahaira Muñoz MD   120 mg at 10/10/21 1030    sodium chloride flush 0.9 % injection 10 mL  10 mL IntraVENous 2 times per day Danny Jo MD   10 mL at 10/10/21 1032    sodium chloride flush 0.9 % injection 10 mL  10 mL IntraVENous PRN Jahaira Muñoz MD   10 mL at 10/09/21 1727    0.9 % sodium chloride infusion  25 mL IntraVENous PRN Jahaira Muñoz MD        polyethylene glycol (GLYCOLAX) packet 17 g  17 g Oral Daily PRN Jahaira Muñoz MD        acetaminophen (TYLENOL) tablet 650 mg  650 mg Oral Q6H PRN Jahaira Muñoz MD   650 mg at 10/09/21 2049    Or    acetaminophen (TYLENOL) suppository 650 mg  650 mg Rectal Q6H PRN Jahaira Muñoz MD        enoxaparin (LOVENOX) injection 40 mg  40 mg SubCUTAneous Daily Jahaira Muñoz MD   40 mg at 10/10/21 1031    dexamethasone (DECADRON) injection 6 mg  6 mg IntraVENous Q24H Jahaira Muñoz MD   6 mg at 10/10/21 8278    ascorbic acid (VITAMIN C) tablet 1,000 mg  1,000 mg Oral BID Jahaira Muñoz MD   1,000 mg at 10/10/21 1031    zinc sulfate (ZINCATE) capsule 50 mg  50 mg Oral BID Jahaira Muñoz MD   50 mg at 10/10/21 1031    benzonatate (TESSALON) capsule 100 mg  100 mg Oral TID PRN Danny Jo MD   100 mg at 10/09/21 2049    Vitamin D (CHOLECALCIFEROL) tablet 1,000 Units  1,000 Units Oral Daily Jahaira Muñoz MD   1,000 Units at 10/10/21 1030    remdesivir 100 mg in sodium chloride 0.9 % 250 mL IVPB  100 mg IntraVENous Q24H Lynda Nobles MD   Stopped at 10/09/21 2047    0.9 % sodium chloride bolus  30 mL IntraVENous PRN Lynda Nobles MD        guaiFENesin-dextromethorphan (ROBITUSSIN DM) 100-10 MG/5ML syrup 5 mL  5 mL Oral Q4H PRN Cheryl Noel MD Maribel   5 mL at 10/07/21 1750      sodium chloride         Physical Exam:  BP (!) 142/83   Pulse 63   Temp 97.9 °F (36.6 °C) (Infrared)   Resp 16   Ht 5' 8\" (1.727 m)   Wt 214 lb (97.1 kg)   SpO2 94%   BMI 32.54 kg/m²   Wt Readings from Last 3 Encounters:   10/06/21 214 lb (97.1 kg)   09/13/21 212 lb (96.2 kg)   09/02/21 217 lb 6.4 oz (98.6 kg)           Intake/Output:    Intake/Output Summary (Last 24 hours) at 10/10/2021 1409  Last data filed at 10/9/2021 2045  Gross per 24 hour   Intake 340 ml   Output 0 ml   Net 340 ml     No intake/output data recorded. Laboratory Tests:  Recent Labs     10/08/21  0722 10/09/21  0518 10/10/21  0739    138 137   K 4.4 4.6 4.7    104 104   CO2 24 25 25   BUN 27* 28* 32*   CREATININE 2.1* 1.9* 1.9*   GLUCOSE 129* 117* 116*   CALCIUM 9.3 9.4 9.2     Lab Results   Component Value Date    MG 2.0 08/24/2021    MG 2.1 01/27/2017     Recent Labs     10/08/21  0722 10/09/21  0518 10/10/21  0739   ALKPHOS 61 66 67   ALT 15 14 14   AST 21 16 12   PROT 7.0 6.9 6.6   BILITOT 0.3 0.3 0.3   BILIDIR <0.2 <0.2 <0.2   LABALBU 3.5 3.3* 3.3*     Recent Labs     10/08/21  0722 10/09/21  0518 10/10/21  0739   WBC 11.5 13.1* 14.0*   RBC 4.76 4.70 4.67   HGB 13.4 13.5 13.2   HCT 42.4 41.3 41.7   MCV 89.1 87.9 89.3   MCH 28.2 28.7 28.3   MCHC 31.6* 32.7 31.7*   RDW 14.0 14.2 14.5    258 309   MPV 10.7 10.8 10.6     Lab Results   Component Value Date    CKTOTAL 535 (H) 08/25/2021    CKMB 2.8 08/25/2021     No results for input(s): CKTOTAL, CKMB, CKMBINDEX, TROPHS in the last 72 hours.   Lab Results   Component Value Date    INR 1.1 01/28/2017    PROTIME 12.2 01/28/2017     Lab Results   Component Value Date    TSH 1.070 08/27/2015    TSH 1.070 01/16/2015     Lab Results   Component Value Date    LABA1C 5.9 08/27/2015    LABA1C 6.2 (H) 01/16/2015     No results found for: EAG  Lab Results   Component Value Date    CHOL 142 08/25/2021    CHOL 140 08/25/2021    CHOL 199 10/03/2017     Lab Results   Component Value Date    TRIG 257 (H) 08/25/2021    TRIG 258 (H) 08/25/2021    TRIG 132 10/03/2017     Lab Results   Component Value Date    HDL 29 08/25/2021    HDL 28 08/25/2021    HDL 39 10/03/2017     Lab Results   Component Value Date    LDLCALC 62 08/25/2021    LDLCALC 60 08/25/2021    LDLCALC 134 (H) 10/03/2017     Lab Results   Component Value Date    LABVLDL 51 08/25/2021    LABVLDL 52 08/25/2021    LABVLDL 26 10/03/2017     No results found for: CHOLHDLRATIO  No results for input(s): PROBNP in the last 72 hours. Cardiac Tests:      Echocardiogram reviewed: September 2020     Left Ventricle   Left ventricular internal dimensions were normal in diastole and systole. Mild left ventricular concentric hypertrophy noted. No regional wall motion abnormalities seen. Normal left ventricular ejection fraction. Ejection fraction is visually estimated at 65%. There is doppler evidence of stage I diastolic dysfunction. Stress test reviewed:  5/2021  Impression:     1. Electrocardiographically normal regadenoson infusion with a   clinically non-ischemic response when compared to the baseline   ECG   2. Myocardial perfusion imaging was normal with attenuation   artifact.     3. Overall left ventricular systolic function was normal without   regional wall motion abnormalities. 4.   Low risk general pharmacologic stress test.     Cardiac catheterization reviewed:     CXR reviewed: The 10-year ASCVD risk score (92 Danni Zambrano StrKarena, et al., 2013) is: 8.8%    Values used to calculate the score:      Age: 39 years      Sex: Male      Is Non- : Yes      Diabetic: No      Tobacco smoker: No      Systolic Blood Pressure: 838 mmHg      Is BP treated: Yes      HDL Cholesterol: 29 mg/dL      Total Cholesterol: 142 mg/dL    ASSESSMENT / PLAN:    1. JOS (acute kidney injury) (HCC)/chronic kidney disease  Management per primary and nephrology  2.  Syncope and collapse  Device interrogation report reviewed and there was no evidence of new arrhythmias around the time of symptoms that brought him to the emergency room. Continue beta-blocker  Follow-up with your cardiologist  3. COVID  Management per primary service and infectious disease as well as pulmonology    4. History of nonischemic cardiomyopathy with prior EF noted to be 20 to 25% in 2016 status post ICD around that time. EF improved with guideline directed therapy to 60% on echo on May 2021.    5.  Hypertension  Management per primary service  6. Sleep apnea  On CPAP        Cardiology will sign off for now. Please call with questions. Thank you for allowing me to participate in your patient's care. Please feel free to contact me if you have any questions or concerns.     Fantasma Morales MD  [facility] Cardiology

## 2021-10-11 ENCOUNTER — CARE COORDINATION (OUTPATIENT)
Dept: CASE MANAGEMENT | Age: 46
End: 2021-10-11

## 2021-10-11 DIAGNOSIS — N17.9 AKI (ACUTE KIDNEY INJURY) (HCC): Primary | ICD-10-CM

## 2021-10-12 ENCOUNTER — CARE COORDINATION (OUTPATIENT)
Dept: CASE MANAGEMENT | Age: 46
End: 2021-10-12

## 2021-10-12 NOTE — CARE COORDINATION
Request from Jerilyn Dick RN.,   Please schedule patient for hospital f/u. Patient is covid positive. Called providers office,  [address] does not do VV . Nickie Dempsey .first available office visit is 10/19 after isolation. Patient aware of time and date.     Tapan Iyer, [address]  Care Coordination Transition

## 2021-10-17 ENCOUNTER — APPOINTMENT (OUTPATIENT)
Dept: GENERAL RADIOLOGY | Age: 46
End: 2021-10-17
Payer: MEDICAID

## 2021-10-17 ENCOUNTER — HOSPITAL ENCOUNTER (EMERGENCY)
Age: 46
Discharge: HOME | End: 2021-10-17
Attending: STUDENT IN AN ORGANIZED HEALTH CARE EDUCATION/TRAINING PROGRAM
Payer: MEDICAID

## 2021-10-17 VITALS
OXYGEN SATURATION: 97 % | TEMPERATURE: 97.2 F | RESPIRATION RATE: 18 BRPM | SYSTOLIC BLOOD PRESSURE: 147 MMHG | HEART RATE: 67 BPM | DIASTOLIC BLOOD PRESSURE: 82 MMHG

## 2021-10-17 DIAGNOSIS — U07.1 COVID-19: Primary | ICD-10-CM

## 2021-10-17 DIAGNOSIS — K21.9 GASTROESOPHAGEAL REFLUX DISEASE, UNSPECIFIED WHETHER ESOPHAGITIS PRESENT: ICD-10-CM

## 2021-10-17 LAB
ALBUMIN SERPL-MCNC: 3.9 G/DL (ref 3.5–5.2)
ALP BLD-CCNC: 85 U/L (ref 40–129)
ALT SERPL-CCNC: 30 U/L (ref 0–40)
ANION GAP SERPL CALCULATED.3IONS-SCNC: 12 MMOL/L (ref 7–16)
AST SERPL-CCNC: 15 U/L (ref 0–39)
BASOPHILS ABSOLUTE: 0.05 E9/L (ref 0–0.2)
BASOPHILS RELATIVE PERCENT: 0.2 % (ref 0–2)
BILIRUB SERPL-MCNC: 0.8 MG/DL (ref 0–1.2)
BILIRUBIN URINE: NEGATIVE
BLOOD, URINE: NEGATIVE
BUN BLDV-MCNC: 47 MG/DL (ref 6–20)
CALCIUM SERPL-MCNC: 9.5 MG/DL (ref 8.6–10.2)
CHLORIDE BLD-SCNC: 99 MMOL/L (ref 98–107)
CLARITY: CLEAR
CO2: 25 MMOL/L (ref 22–29)
COLOR: YELLOW
CREAT SERPL-MCNC: 2.3 MG/DL (ref 0.7–1.2)
D DIMER: <200 NG/ML DDU
EOSINOPHILS ABSOLUTE: 0.04 E9/L (ref 0.05–0.5)
EOSINOPHILS RELATIVE PERCENT: 0.2 % (ref 0–6)
GFR AFRICAN AMERICAN: 37
GFR NON-AFRICAN AMERICAN: 37 ML/MIN/1.73
GLUCOSE BLD-MCNC: 122 MG/DL (ref 74–99)
GLUCOSE URINE: NEGATIVE MG/DL
HCT VFR BLD CALC: 50.7 % (ref 37–54)
HEMOGLOBIN: 16.6 G/DL (ref 12.5–16.5)
IMMATURE GRANULOCYTES #: 0.36 E9/L
IMMATURE GRANULOCYTES %: 1.7 % (ref 0–5)
KETONES, URINE: NEGATIVE MG/DL
LEUKOCYTE ESTERASE, URINE: NEGATIVE
LYMPHOCYTES ABSOLUTE: 2.2 E9/L (ref 1.5–4)
LYMPHOCYTES RELATIVE PERCENT: 10.5 % (ref 20–42)
MCH RBC QN AUTO: 28 PG (ref 26–35)
MCHC RBC AUTO-ENTMCNC: 32.7 % (ref 32–34.5)
MCV RBC AUTO: 85.5 FL (ref 80–99.9)
MONOCYTES ABSOLUTE: 1.48 E9/L (ref 0.1–0.95)
MONOCYTES RELATIVE PERCENT: 7.1 % (ref 2–12)
NEUTROPHILS ABSOLUTE: 16.76 E9/L (ref 1.8–7.3)
NEUTROPHILS RELATIVE PERCENT: 80.3 % (ref 43–80)
NITRITE, URINE: NEGATIVE
PDW BLD-RTO: 14 FL (ref 11.5–15)
PH UA: 6 (ref 5–9)
PLATELET # BLD: 403 E9/L (ref 130–450)
PMV BLD AUTO: 10 FL (ref 7–12)
POTASSIUM SERPL-SCNC: 5.1 MMOL/L (ref 3.5–5)
PROTEIN UA: NEGATIVE MG/DL
RBC # BLD: 5.93 E12/L (ref 3.8–5.8)
SODIUM BLD-SCNC: 136 MMOL/L (ref 132–146)
SPECIFIC GRAVITY UA: 1.02 (ref 1–1.03)
TOTAL PROTEIN: 7.5 G/DL (ref 6.4–8.3)
UROBILINOGEN, URINE: 0.2 E.U./DL
WBC # BLD: 20.9 E9/L (ref 4.5–11.5)

## 2021-10-17 PROCEDURE — 85378 FIBRIN DEGRADE SEMIQUANT: CPT

## 2021-10-17 PROCEDURE — 6370000000 HC RX 637 (ALT 250 FOR IP): Performed by: STUDENT IN AN ORGANIZED HEALTH CARE EDUCATION/TRAINING PROGRAM

## 2021-10-17 PROCEDURE — 2580000003 HC RX 258: Performed by: STUDENT IN AN ORGANIZED HEALTH CARE EDUCATION/TRAINING PROGRAM

## 2021-10-17 PROCEDURE — 71046 X-RAY EXAM CHEST 2 VIEWS: CPT

## 2021-10-17 PROCEDURE — 99283 EMERGENCY DEPT VISIT LOW MDM: CPT

## 2021-10-17 PROCEDURE — 93005 ELECTROCARDIOGRAM TRACING: CPT | Performed by: STUDENT IN AN ORGANIZED HEALTH CARE EDUCATION/TRAINING PROGRAM

## 2021-10-17 PROCEDURE — 80053 COMPREHEN METABOLIC PANEL: CPT

## 2021-10-17 PROCEDURE — 81003 URINALYSIS AUTO W/O SCOPE: CPT

## 2021-10-17 PROCEDURE — 85025 COMPLETE CBC W/AUTO DIFF WBC: CPT

## 2021-10-17 RX ORDER — 0.9 % SODIUM CHLORIDE 0.9 %
1000 INTRAVENOUS SOLUTION INTRAVENOUS ONCE
Status: COMPLETED | OUTPATIENT
Start: 2021-10-17 | End: 2021-10-17

## 2021-10-17 RX ORDER — FAMOTIDINE 20 MG/1
20 TABLET, FILM COATED ORAL ONCE
Status: COMPLETED | OUTPATIENT
Start: 2021-10-17 | End: 2021-10-17

## 2021-10-17 RX ORDER — FAMOTIDINE 20 MG/1
20 TABLET, FILM COATED ORAL 2 TIMES DAILY
Qty: 60 TABLET | Refills: 0 | Status: SHIPPED | OUTPATIENT
Start: 2021-10-17

## 2021-10-17 RX ADMIN — SODIUM CHLORIDE 1000 ML: 9 INJECTION, SOLUTION INTRAVENOUS at 19:05

## 2021-10-17 RX ADMIN — FAMOTIDINE 20 MG: 20 TABLET, FILM COATED ORAL at 18:05

## 2021-10-17 RX ADMIN — LIDOCAINE HYDROCHLORIDE: 20 SOLUTION ORAL; TOPICAL at 18:05

## 2021-10-17 ASSESSMENT — ENCOUNTER SYMPTOMS
CONSTIPATION: 0
TROUBLE SWALLOWING: 0
RHINORRHEA: 0
ABDOMINAL PAIN: 1
CHEST TIGHTNESS: 1
BACK PAIN: 0
SHORTNESS OF BREATH: 1
SINUS PAIN: 0
COUGH: 0
EYE PAIN: 0
NAUSEA: 1
SORE THROAT: 0
EYE REDNESS: 0
SINUS PRESSURE: 0
DIARRHEA: 0
VOMITING: 0

## 2021-10-17 NOTE — ED NOTES
Bed: 16  Expected date:   Expected time:   Means of arrival:   Comments:  Use for COV+     Dona Overton RN  10/17/21 [id number]

## 2021-10-17 NOTE — ED PROVIDER NOTES
[address] [facility]  Department of Emergency Medicine     Written by: Lisbeth Tavera DO  Patient Name: Maine Redman  Attending Provider: Chaz Mcnally DO  Admit Date: 10/17/2021  5:33 PM  MRN: [id number]                   : 1975        Chief Complaint   Patient presents with    Shortness of Breath     d/c last week from covid, increased SOB off and on    - Chief complaint    Mr. Yolanda Romero is a 40 yo male with a PMHx of CAD, CHF, CKD, COPD and HTN who presents with persistent shortness of breath after being diagnosed with COVID last week. Patient was diagnosed with Covid on 10/6/2021. He has been on steroids as an outpatient. States he does have some persistent shortness of breath that is aggravated with exertion and relieved with rest.  He is saturating 100% on room air. His symptoms have been constant since onset. Patient has a history of gastroesophageal reflux disease and ran out of his medications recently. He notes it was burning sensation with nausea that is worsened with laying flat and mildly relieved with sitting up and walking around. Patient denies any vomiting episodes. Patient denies any systemic symptoms such as fever, chills, chest pain, abdominal pain, bowel or urinary changes, headaches or vision changes. Review of Systems   Constitutional: Positive for appetite change. Negative for chills, fatigue and fever. HENT: Negative for congestion, rhinorrhea, sinus pressure, sinus pain, sneezing, sore throat and trouble swallowing. Eyes: Negative for pain, redness and visual disturbance. Respiratory: Positive for chest tightness and shortness of breath. Negative for cough. Cardiovascular: Negative for chest pain, palpitations and leg swelling. Gastrointestinal: Positive for abdominal pain (Suprapubic discomfort) and nausea. Negative for constipation, diarrhea and vomiting.    Genitourinary: Negative for dysuria, flank pain, frequency, hematuria and urgency. Musculoskeletal: Negative for arthralgias, back pain, joint swelling and myalgias. Skin: Negative for rash. Neurological: Negative for dizziness, tremors, seizures, weakness, light-headedness, numbness and headaches. Physical Exam  Constitutional:       General: He is not in acute distress. Appearance: Normal appearance. He is well-developed and normal weight. HENT:      Head: Normocephalic and atraumatic. Right Ear: External ear normal.      Left Ear: External ear normal.      Mouth/Throat:      Mouth: Mucous membranes are moist.      Pharynx: Oropharynx is clear. Eyes:      Extraocular Movements: Extraocular movements intact. Conjunctiva/sclera: Conjunctivae normal.   Cardiovascular:      Rate and Rhythm: Normal rate and regular rhythm. Pulses: Normal pulses. Heart sounds: Normal heart sounds. Pulmonary:      Effort: Pulmonary effort is normal. No respiratory distress. Breath sounds: Decreased breath sounds (Diffuse) present. Abdominal:      General: Abdomen is flat. Bowel sounds are normal.      Palpations: Abdomen is soft. Tenderness: There is abdominal tenderness (Suprapubic). Musculoskeletal:         General: Normal range of motion. Cervical back: Normal range of motion and neck supple. Right lower leg: No edema. Left lower leg: No edema. Skin:     General: Skin is warm and dry. Neurological:      General: No focal deficit present. Mental Status: He is alert and oriented to person, place, and time. Mental status is at baseline. Cranial Nerves: No cranial nerve deficit. Motor: No weakness. Psychiatric:         Mood and Affect: Mood normal.         Behavior: Behavior normal.          Procedures       MDM  Number of Diagnoses or Management Options  COVID-19  Gastroesophageal reflux disease, unspecified whether esophagitis present  Diagnosis management comments:  This is a 38 yo male with a PMHx of CAD, CHF, CKD, have dry mucous membranes, will give IV fluids. [BB]      ED Course User Index  [BB] Rosi Downing DO        ED Course as of Oct 17 2231   Sun Oct 17, 2021   2114 ATTENDING PROVIDER ATTESTATION:     I have personally performed and/or participated in the history, exam, medical decision making, and procedures and agree with all pertinent clinical information unless otherwise noted. I have also reviewed and agree with the past medical, family and social history unless otherwise noted. I have discussed this patient in detail with the resident, and provided the instruction and education regarding the patient. My findings/plan:   Recently discharged from facility for Jaime. He has been on steroids outpatient. Notes he has history of GERD and ran out of medicine yesterday. Patient notes some burning and worsening with laying flat. Denies any chest pain, shortness of breath, nausea, vomiting. Lying bed no acute distress. Lungs clear auscultation bilaterallyHeart regular rate and rhythmAbdomen soft, nondistended, nontender. Plan for labs and supportive care. [BB]   2115 Patient reevaluated, feels improved after his GI cocktail. Does have dry mucous membranes, will give IV fluids. [BB]      ED Course User Index  [BB] Rosi Downing DO       --------------------------------------------- PAST HISTORY ---------------------------------------------  Past Medical History:  has a past medical history of AICD (automatic cardioverter/defibrillator) present, Asthma, CAD (coronary artery disease), Cardiac LV ejection fraction 10-20%, CHF (congestive heart failure) ([facility] [address] CKD (chronic kidney disease), COPD (chronic obstructive pulmonary disease) ([facility] [address] COVID-19, Depression, Diverticulitis, GERD (gastroesophageal reflux disease), Hyperlipidemia, Hypertension, Nonischemic cardiomyopathy ([facility] [address] Panic attacks, and Sleep apnea.     Past Surgical History:  has a past surgical history that includes Diagnostic Cardiac Cath Lab Procedure (2015); Cardiac defibrillator placement (Left, 01/27/2017); and pr colonoscopy flx dx w/collj spec when pfrmd (N/A, 9/6/2018). Social History:  reports that he has never smoked. He has never used smokeless tobacco. He reports current alcohol use. He reports current drug use. Drug: Marijuana. Family History: family history includes Cancer in his father; No Known Problems in his mother; Other in his sister. The patients home medications have been reviewed. Allergies: Patient has no known allergies.     -------------------------------------------------- RESULTS -------------------------------------------------  Labs:  Results for orders placed or performed during the hospital encounter of 10/17/21   CBC Auto Differential   Result Value Ref Range    WBC 20.9 (H) 4.5 - 11.5 E9/L    RBC 5.93 (H) 3.80 - 5.80 E12/L    Hemoglobin 16.6 (H) 12.5 - 16.5 g/dL    Hematocrit 50.7 37.0 - 54.0 %    MCV 85.5 80.0 - 99.9 fL    MCH 28.0 26.0 - 35.0 pg    MCHC 32.7 32.0 - 34.5 %    RDW 14.0 11.5 - 15.0 fL    Platelets 277 848 - 864 E9/L    MPV 10.0 7.0 - 12.0 fL    Neutrophils % 80.3 (H) 43.0 - 80.0 %    Immature Granulocytes % 1.7 0.0 - 5.0 %    Lymphocytes % 10.5 (L) 20.0 - 42.0 %    Monocytes % 7.1 2.0 - 12.0 %    Eosinophils % 0.2 0.0 - 6.0 %    Basophils % 0.2 0.0 - 2.0 %    Neutrophils Absolute 16.76 (H) 1.80 - 7.30 E9/L    Immature Granulocytes # 0.36 E9/L    Lymphocytes Absolute 2.20 1.50 - 4.00 E9/L    Monocytes Absolute 1.48 (H) 0.10 - 0.95 E9/L    Eosinophils Absolute 0.04 (L) 0.05 - 0.50 E9/L    Basophils Absolute 0.05 0.00 - 0.20 E9/L   Comprehensive Metabolic Panel   Result Value Ref Range    Sodium 136 132 - 146 mmol/L    Potassium 5.1 (H) 3.5 - 5.0 mmol/L    Chloride 99 98 - 107 mmol/L    CO2 25 22 - 29 mmol/L    Anion Gap 12 7 - 16 mmol/L    Glucose 122 (H) 74 - 99 mg/dL    BUN 47 (H) 6 - 20 mg/dL    CREATININE 2.3 (H) 0.7 - 1.2 mg/dL    GFR Non-African American 37 >=60 mL/min/1.73    GFR African American 37     Calcium 9.5 8.6 - 10.2 mg/dL    Total Protein 7.5 6.4 - 8.3 g/dL    Albumin 3.9 3.5 - 5.2 g/dL    Total Bilirubin 0.8 0.0 - 1.2 mg/dL    Alkaline Phosphatase 85 40 - 129 U/L    ALT 30 0 - 40 U/L    AST 15 0 - 39 U/L   D-Dimer, Quantitative   Result Value Ref Range    D-Dimer, Quant <200 ng/mL DDU   Urinalysis   Result Value Ref Range    Color, UA Yellow Straw/Yellow    Clarity, UA Clear Clear    Glucose, Ur Negative Negative mg/dL    Bilirubin Urine Negative Negative    Ketones, Urine Negative Negative mg/dL    Specific Gravity, UA 1.020 1.005 - 1.030    Blood, Urine Negative Negative    pH, UA 6.0 5.0 - 9.0    Protein, UA Negative Negative mg/dL    Urobilinogen, Urine 0.2 <2.0 E.U./dL    Nitrite, Urine Negative Negative    Leukocyte Esterase, Urine Negative Negative       Radiology:  XR CHEST (2 VW)   Final Result   Interval improvement of bilateral infiltrates with residual changes in the   right mid and lower lung.             ------------------------- NURSING NOTES AND VITALS REVIEWED ---------------------------  Date / Time Roomed:  10/17/2021  5:33 PM  ED Bed Assignment:  16/16    The nursing notes within the ED encounter and vital signs as below have been reviewed. BP (!) 158/87   Pulse 61   Temp 97.2 °F (36.2 °C) (Tympanic)   Resp 20   SpO2 97%   Oxygen Saturation Interpretation: Normal      ------------------------------------------ PROGRESS NOTES ------------------------------------------  10:31 PM EDT  I have spoken with the patient and discussed todays results, in addition to providing specific details for the plan of care and counseling regarding the diagnosis and prognosis. Their questions are answered at this time and they are agreeable with the plan. I discussed at length with them reasons for immediate return here for re evaluation.  They will followup with their primary care physician by calling their office tomorrow. --------------------------------- ADDITIONAL PROVIDER NOTES ---------------------------------  At this time the patient is without objective evidence of an acute process requiring hospitalization or inpatient management. They have remained hemodynamically stable throughout their entire ED visit and are stable for discharge with outpatient follow-up. The plan has been discussed in detail and they are aware of the specific conditions for emergent return, as well as the importance of follow-up. New Prescriptions    FAMOTIDINE (PEPCID) 20 MG TABLET    Take 1 tablet by mouth 2 times daily       Diagnosis:  1. COVID-19    2. Gastroesophageal reflux disease, unspecified whether esophagitis present        Disposition:  Patient's disposition: Discharge to home  Patient's condition is stable. Patient was seen and evaluated by myself and my attending Michela Templeton DO. Assessment and Plan discussed with attending provider, please see attestation for final plan of care.      DO Radha Bellamy DO  Resident  10/17/21 [id number]

## 2021-10-17 NOTE — ED NOTES
FIRST PROVIDER CONTACT ASSESSMENT NOTE      Department of Emergency Medicine   Admit Date: No admission date for patient encounter. Chief Complaint: Shortness of Breath (d/c last week from covid, increased SOB off and on)      History of Present Illness:    Fuad Francisco is a 39 y.o. male who presents to the ED for complaints of shortness of breath since he has been discharged from the hospital last week after diagnosed with COVID-19.   Denies any chest pain.        -----------------END OF FIRST PROVIDER CONTACT ASSESSMENT NOTE--------------  Electronically signed by GIOVANNY Funk CNP   DD: 10/17/21               Scarlett Opitz, APRN - CNP  10/17/21 [id number]

## 2021-10-18 LAB
EKG ATRIAL RATE: 52 BPM
EKG P AXIS: 22 DEGREES
EKG P-R INTERVAL: 136 MS
EKG Q-T INTERVAL: 432 MS
EKG QRS DURATION: 104 MS
EKG QTC CALCULATION (BAZETT): 401 MS
EKG R AXIS: 52 DEGREES
EKG T AXIS: 62 DEGREES
EKG VENTRICULAR RATE: 52 BPM

## 2021-10-19 ENCOUNTER — CARE COORDINATION (OUTPATIENT)
Dept: CASE MANAGEMENT | Age: 46
End: 2021-10-19

## 2021-10-19 ENCOUNTER — OFFICE VISIT (OUTPATIENT)
Dept: FAMILY MEDICINE CLINIC | Age: 46
End: 2021-10-19
Payer: MEDICAID

## 2021-10-19 VITALS
BODY MASS INDEX: 30.19 KG/M2 | HEIGHT: 68 IN | OXYGEN SATURATION: 99 % | WEIGHT: 199.2 LBS | SYSTOLIC BLOOD PRESSURE: 110 MMHG | TEMPERATURE: 97.4 F | HEART RATE: 86 BPM | DIASTOLIC BLOOD PRESSURE: 70 MMHG | RESPIRATION RATE: 20 BRPM

## 2021-10-19 DIAGNOSIS — U07.1 COVID: Primary | ICD-10-CM

## 2021-10-19 DIAGNOSIS — I10 ESSENTIAL HYPERTENSION: ICD-10-CM

## 2021-10-19 DIAGNOSIS — N18.32 STAGE 3B CHRONIC KIDNEY DISEASE (HCC): ICD-10-CM

## 2021-10-19 PROCEDURE — 1111F DSCHRG MED/CURRENT MED MERGE: CPT | Performed by: FAMILY MEDICINE

## 2021-10-19 PROCEDURE — G8427 DOCREV CUR MEDS BY ELIG CLIN: HCPCS | Performed by: FAMILY MEDICINE

## 2021-10-19 PROCEDURE — 1036F TOBACCO NON-USER: CPT | Performed by: FAMILY MEDICINE

## 2021-10-19 PROCEDURE — G8417 CALC BMI ABV UP PARAM F/U: HCPCS | Performed by: FAMILY MEDICINE

## 2021-10-19 PROCEDURE — 99214 OFFICE O/P EST MOD 30 MIN: CPT | Performed by: FAMILY MEDICINE

## 2021-10-19 PROCEDURE — G8484 FLU IMMUNIZE NO ADMIN: HCPCS | Performed by: FAMILY MEDICINE

## 2021-10-19 ASSESSMENT — ENCOUNTER SYMPTOMS
COUGH: 0
GASTROINTESTINAL NEGATIVE: 1
SHORTNESS OF BREATH: 0
WHEEZING: 0
SINUS PAIN: 0
RHINORRHEA: 0

## 2021-10-19 NOTE — CARE COORDINATION
[facility] Transitions Follow Up Call    10/19/2021    Patient: Kerne Boston  Patient : 1975   MRN: [id number]  Reason for Admission: JOS  Discharge Date: 10/17/21 RARS: Readmission Risk Score: 20      Care Transition Nurse contacted the patient by telephone to perform follow-up assessment. Patient has following risk factors of: heart failure, asthma and obesity, CAD, JOLENE. Symptoms reviewed with patient who verbalized the following symptoms: loss of taste or smell, no new symptoms and no worsening symptoms. Noted pt was in ED on 10/17/21 for increased sob and GI symptoms. Pt was given a rx for Pepcid. Pt states that he is feeling better today. Pt denies any sob, cough, wheezing, or chest tightness. Pt denies any GI symptoms, such as reflux, nausea, or vomiting. Pt completed his HFU appt today with his pcp and states that he was given the okay to return to work. Pt was advised to f/u with renal by pcp. Pt states he called today and scheduled a f/u with Dr. Agustina Miles for 21. Pt had no needs/concerns today. Pt was agreeable to continued outreaches from this CTN. Due to no new or worsening symptoms encounter was not routed to provider for escalation. CTN reviewed medical action plan and red flag symptoms with the patient who verbalized understanding. Patient was given an opportunity to verbalize any questions and concerns and agrees to contact CTN or health care provider for questions related to their healthcare. Was patient discharged with a pulse oximeter? No     CTN provided contact information. Plan for follow-up call in 7-10 days based on severity of symptoms and risk factors.

## 2021-10-19 NOTE — PROGRESS NOTES
Post-Discharge Transitional Care Management Services or Hospital Follow Up      Quyen Edis   YOB: 1975    Date of Office Visit:  10/19/2021  Date of Hospital Admission: 10/17/21  Date of Hospital Discharge: 10/17/21  Readmission Risk Score(high >=14%.  Medium >=10%):Readmission Risk Score: 20      Care management risk score Rising risk (score 2-5) and Complex Care (Scores >=6): 7     Non face to face  following discharge, date last encounter closed (first attempt may have been earlier): 10/11/2021 11:32 AM 10/11/2021 11:32 AM    Call initiated 2 business days of discharge: Yes     Patient Active Problem List   Diagnosis    Asthma    CKD (chronic kidney disease) stage 3, GFR 30-59 ml/min (HCC)    Nonischemic cardiomyopathy ([address]    Chronic systolic heart failure ([address]    Moderate obesity    Essential hypertension    Implantable cardioverter-defibrillator (ICD) in situ    Coronary artery disease involving native coronary artery of native heart without angina pectoris    Pure hypercholesterolemia    Chest pain    JOLENE on CPAP    Chronic heart failure with preserved ejection fraction ([address]    Nonrheumatic mitral valve regurgitation    Hyperlipidemia    JOS (acute kidney injury) ([address]    COVID-19       No Known Allergies    Medications listed as ordered at the time of discharge from hospital   [facility] Medication Instructions LUIS ANGEL:    Printed on:10/19/21 2565   Medication Information                      albuterol sulfate HFA (PROVENTIL HFA) 108 (90 BASE) MCG/ACT inhaler  Inhale 2 puffs into the lungs every 6 hours as needed for Wheezing             ascorbic acid (VITAMIN C) 1000 MG tablet  Take 0.5 tablets by mouth 2 times daily             ASPIRIN LOW DOSE 81 MG chewable tablet  CHEW AND SWALLOW  ONE TABLET BY MOUTH EVERY DAY             carvedilol (COREG) 25 MG tablet  Take 1 tablet by mouth 2 times daily             Cholecalciferol (VITAMIN D) 25 MCG TABS  Take 1 tablet by mouth daily             CORLANOR 5 MG TABS tablet  TAKE ONE TABLET BY MOUTH TWICE A DAY WITH MEALS             CPAP Machine MISC  by Does not apply route nightly             famotidine (PEPCID) 20 MG tablet  Take 1 tablet by mouth 2 times daily             furosemide (LASIX) 40 MG tablet  TAKE ONE TABLET BY MOUTH EVERY DAY             guaiFENesin-dextromethorphan (ROBITUSSIN DM) 100-10 MG/5ML syrup  Take 5 mLs by mouth every 4 hours as needed for Cough             hydrALAZINE (APRESOLINE) 25 MG tablet  TAKE THREE (3) TABLETS BY MOUTH THREE TIMES DAILY             isosorbide mononitrate (IMDUR) 120 MG extended release tablet  TAKE ONE TABLET BY MOUTH EVERY DAY             zinc sulfate (ZINCATE) 220 (50 Zn) MG capsule  Take 1 capsule by mouth 2 times daily                   Medications marked \"taking\" at this time  Outpatient Medications Marked as Taking for the 10/19/21 encounter (Office Visit) with Reyes Sainz DO   Medication Sig Dispense Refill    famotidine (PEPCID) 20 MG tablet Take 1 tablet by mouth 2 times daily 60 tablet 0    guaiFENesin-dextromethorphan (ROBITUSSIN DM) 100-10 MG/5ML syrup Take 5 mLs by mouth every 4 hours as needed for Cough 120 mL 0    ascorbic acid (VITAMIN C) 1000 MG tablet Take 0.5 tablets by mouth 2 times daily 30 tablet 3    Cholecalciferol (VITAMIN D) 25 MCG TABS Take 1 tablet by mouth daily 60 tablet 0    zinc sulfate (ZINCATE) 220 (50 Zn) MG capsule Take 1 capsule by mouth 2 times daily 30 capsule 3    carvedilol (COREG) 25 MG tablet Take 1 tablet by mouth 2 times daily 180 tablet 3    CORLANOR 5 MG TABS tablet TAKE ONE TABLET BY MOUTH TWICE A DAY WITH MEALS 180 tablet 1    hydrALAZINE (APRESOLINE) 25 MG tablet TAKE THREE (3) TABLETS BY MOUTH THREE TIMES DAILY 270 tablet 3    ASPIRIN LOW DOSE 81 MG chewable tablet CHEW AND SWALLOW  ONE TABLET BY MOUTH EVERY DAY 90 tablet 3    CPAP Machine MISC by Does not apply route nightly      furosemide (LASIX) 40 MG tablet TAKE ONE TABLET BY MOUTH EVERY DAY 90 tablet 3    isosorbide mononitrate (IMDUR) 120 MG extended release tablet TAKE ONE TABLET BY MOUTH EVERY DAY 90 tablet 3    albuterol sulfate HFA (PROVENTIL HFA) 108 (90 BASE) MCG/ACT inhaler Inhale 2 puffs into the lungs every 6 hours as needed for Wheezing 1 Inhaler 0        Medications patient taking as of now reconciled against medications ordered at time of hospital discharge: Yes    Chief Complaint   Patient presents with    Follow-Up from Hospital     Patient was in [address] [place] [address],[address] for kidney stone. Patient also is recovering from Covid. Last few days patient has been gasping for air. HPI    Inpatient course: Discharge summary reviewed- see chart. Interval history/Current status: Patient states he is doing well since his discharge from the hospital.  He is no longer short of breath and he has not had any fevers. Review of Systems   Constitutional: Negative for chills, fatigue and fever. HENT: Negative for congestion, mouth sores, postnasal drip, rhinorrhea and sinus pain. Respiratory: Negative for cough, shortness of breath and wheezing. Cardiovascular: Negative for chest pain. Gastrointestinal: Negative. Genitourinary: Negative for difficulty urinating and dysuria. Neurological: Negative for headaches. Vitals:    10/19/21 0816   BP: 110/70   Pulse: 86   Resp: 20   Temp: 97.4 °F (36.3 °C)   SpO2: 99%   Weight: 199 lb 3.2 oz (90.4 kg)   Height: 5' 8\" (1.727 m)     Body mass index is 30.29 kg/m². Wt Readings from Last 3 Encounters:   10/19/21 199 lb 3.2 oz (90.4 kg)   10/06/21 214 lb (97.1 kg)   09/13/21 212 lb (96.2 kg)     BP Readings from Last 3 Encounters:   10/19/21 110/70   10/17/21 (!) 147/82   10/10/21 (!) 142/83       Physical Exam  Vitals and nursing note reviewed. Constitutional:       Appearance: Normal appearance. HENT:      Nose: No congestion or rhinorrhea.       Mouth/Throat:      Mouth: Mucous membranes are moist. Pharynx: Oropharynx is clear. Eyes:      Conjunctiva/sclera: Conjunctivae normal.   Cardiovascular:      Rate and Rhythm: Normal rate and regular rhythm. Heart sounds: Normal heart sounds. Pulmonary:      Effort: Pulmonary effort is normal.      Breath sounds: Normal breath sounds. Musculoskeletal:      Cervical back: Neck supple. Skin:     General: Skin is warm. Neurological:      Mental Status: He is alert and oriented to person, place, and time. Psychiatric:         Mood and Affect: Mood normal.             Assessment/Plan:  1. COVID  Patient is advised that he should have been quarantined for 10 days since his onset of symptoms and 24 hours since the resolution of fever. This time has passed and patient may return to work. - MD DISCHARGE MEDS RECONCILED W/ CURRENT OUTPATIENT MED LIST  Patient will call his nephrologist today to make a follow-up appointment. Patient's creatinine had increased at the time of his hospital admission but has since improved back to baseline with a creatinine of just over 2. Patient states he is aware that he needs to avoid anti-inflammatories such as ibuprofen and Aleve and is using only Tylenol when needed. Patient will continue with all his current medications. Patient will follow up in 3 months.     Medical Decision Making: moderate complexity

## 2021-10-28 ENCOUNTER — CARE COORDINATION (OUTPATIENT)
Dept: CASE MANAGEMENT | Age: 46
End: 2021-10-28

## 2021-10-28 NOTE — CARE COORDINATION
[facility] Transitions Follow Up Call    10/28/2021    Patient: Charles Avelar  Patient : 1975   MRN: [id number]  Reason for Admission: COVID-19+  Discharge Date: 10/17/21 RARS: Readmission Risk Score: 20       Attempted to contact patient today 10/28/21 for hospital discharge follow up sub call. Unable to make contact with patient on home/mobile number as phone rings busy on multiple attempts. CTN will continue with patient outreach.        Vibha Giraldo APRN

## 2021-11-01 RX ORDER — ISOSORBIDE MONONITRATE 120 MG/1
TABLET, EXTENDED RELEASE ORAL
Qty: 90 TABLET | Refills: 3 | Status: SHIPPED
Start: 2021-11-01 | End: 2022-09-14 | Stop reason: SDUPTHER

## 2021-11-03 ENCOUNTER — CARE COORDINATION (OUTPATIENT)
Dept: CASE MANAGEMENT | Age: 46
End: 2021-11-03

## 2021-11-03 NOTE — CARE COORDINATION
[facility] Transitions Follow Up Call    11/3/2021    Patient: Trevor Alvarez  Patient : 1975   MRN: [id number]  Reason for Admission: JOS  Discharge Date: 10/17/21 RARS: Readmission Risk Score: 20       You Patient resolved from the COVID-19 Care Transitions episode on 11/3/21     Patient currently reports that the following symptoms have improved:  cough, shortness of breath and no new/worsening symptoms. Pt states that he feels like he is at his baseline and is doing \"pretty good. \" Pt denies any sob, cough, wheezing, or chest tightness. Pt denies any dizziness/lightheadedness or syncopal episodes. Pt reports that he has returned to work and that everything is going good. Pt has a scheduled f/u with renal (Dr. Magi Brock) on 21 and pt is aware. Pt did not voice any further needs/concerns. Pt agreeable with today being final call, as he has no active concerns/needs. No further outreach scheduled with this CTN/ACM. Episode of Care resolved. Patient has this CTN/ACM contact information if future needs arise.

## 2022-01-05 RX ORDER — ASPIRIN 81 MG/1
81 TABLET, CHEWABLE ORAL DAILY
Qty: 90 TABLET | Refills: 3 | Status: SHIPPED | OUTPATIENT
Start: 2022-01-05

## 2022-01-05 RX ORDER — FUROSEMIDE 40 MG/1
TABLET ORAL
Qty: 90 TABLET | Refills: 3 | Status: SHIPPED
Start: 2022-01-05 | End: 2022-09-14 | Stop reason: SDUPTHER

## 2022-01-05 RX ORDER — IVABRADINE 5 MG/1
5 TABLET, FILM COATED ORAL 2 TIMES DAILY WITH MEALS
Qty: 180 TABLET | Refills: 3 | Status: SHIPPED | OUTPATIENT
Start: 2022-01-05

## 2022-01-05 RX ORDER — HYDRALAZINE HYDROCHLORIDE 25 MG/1
TABLET, FILM COATED ORAL
Qty: 270 TABLET | Refills: 3 | Status: SHIPPED
Start: 2022-01-05 | End: 2022-04-27

## 2022-01-28 ENCOUNTER — OFFICE VISIT (OUTPATIENT)
Dept: CARDIOLOGY CLINIC | Age: 47
End: 2022-01-28
Payer: MEDICAID

## 2022-01-28 VITALS
SYSTOLIC BLOOD PRESSURE: 130 MMHG | DIASTOLIC BLOOD PRESSURE: 82 MMHG | HEIGHT: 68 IN | WEIGHT: 204 LBS | RESPIRATION RATE: 16 BRPM | BODY MASS INDEX: 30.92 KG/M2 | HEART RATE: 55 BPM

## 2022-01-28 DIAGNOSIS — G47.33 OBSTRUCTIVE SLEEP APNEA: ICD-10-CM

## 2022-01-28 DIAGNOSIS — E66.8 MODERATE OBESITY: ICD-10-CM

## 2022-01-28 DIAGNOSIS — I42.8 NONISCHEMIC CARDIOMYOPATHY (HCC): Primary | ICD-10-CM

## 2022-01-28 DIAGNOSIS — I50.22 CHRONIC SYSTOLIC HEART FAILURE (HCC): ICD-10-CM

## 2022-01-28 DIAGNOSIS — E78.00 PURE HYPERCHOLESTEROLEMIA: ICD-10-CM

## 2022-01-28 DIAGNOSIS — I25.10 CORONARY ARTERY DISEASE INVOLVING NATIVE CORONARY ARTERY OF NATIVE HEART WITHOUT ANGINA PECTORIS: ICD-10-CM

## 2022-01-28 DIAGNOSIS — I10 ESSENTIAL HYPERTENSION: ICD-10-CM

## 2022-01-28 DIAGNOSIS — N18.31 STAGE 3A CHRONIC KIDNEY DISEASE (HCC): ICD-10-CM

## 2022-01-28 PROCEDURE — 1036F TOBACCO NON-USER: CPT | Performed by: INTERNAL MEDICINE

## 2022-01-28 PROCEDURE — 93000 ELECTROCARDIOGRAM COMPLETE: CPT | Performed by: INTERNAL MEDICINE

## 2022-01-28 PROCEDURE — G8417 CALC BMI ABV UP PARAM F/U: HCPCS | Performed by: INTERNAL MEDICINE

## 2022-01-28 PROCEDURE — G8484 FLU IMMUNIZE NO ADMIN: HCPCS | Performed by: INTERNAL MEDICINE

## 2022-01-28 PROCEDURE — G8427 DOCREV CUR MEDS BY ELIG CLIN: HCPCS | Performed by: INTERNAL MEDICINE

## 2022-01-28 PROCEDURE — 99214 OFFICE O/P EST MOD 30 MIN: CPT | Performed by: INTERNAL MEDICINE

## 2022-01-28 RX ORDER — ATORVASTATIN CALCIUM 20 MG/1
TABLET, FILM COATED ORAL
COMMUNITY
Start: 2022-01-15 | End: 2022-04-08 | Stop reason: SDUPTHER

## 2022-01-28 RX ORDER — SPIRONOLACTONE 25 MG/1
TABLET ORAL
COMMUNITY
Start: 2022-01-19 | End: 2022-03-31 | Stop reason: SDUPTHER

## 2022-01-28 NOTE — PROGRESS NOTES
OUTPATIENT CARDIOLOGY FOLLOW-UP    Name: Mendez Johnson    Age: 55 y.o. Primary Care Physician: Lesly Blackwell. Romeo Munguia DO    Date of Service: 1/28/2022    Chief Complaint: Nonischemic cardiomyopathy, chronic systolic heart failure, hypertension, chronic kidney disease, moderate obesity, obstructive sleep apnea     Interim History: Since his most recent clinical assessment, the patient was hospitalized with a COVID-19 infection and associated COVID-19 pneumonia and acute kidney injury with symptoms presently resolved following therapy. He is resumed his normal activity without limitations and denies any active manifestations of anginal-like chest discomfort or other ischemic equivalents, decompensated left ventricular systolic dysfunction or volume overload. To his credit with dietary modification, he has lost approximately 20 pounds and is presently normotensive. He denies any arrhythmia related symptoms nor discharge of his implantable cardioverter defibrillator with appropriate device function and no ventricular pacing at time of most recent interrogation. He is remained compliant with medication administration the use of nocturnal CPAP, although presently requiring a new filter for his device. Review of Systems: The remainder of a complete multisystem review including consitutional, central nervous, respiratory, circulatory, gastrointestinal, genitourinary, endocrinologic, hematologic, musculoskeletal and psychiatric are negative.     Past Medical History:  Past Medical History:   Diagnosis Date    AICD (automatic cardioverter/defibrillator) present     Asthma     CAD (coronary artery disease)     Cardiac LV ejection fraction 10-20%     [facility]congest[facility] failure) ([facility])     CKD (chronic kidney disease)     COPD (chronic obstructive pulmonary disease) ([email or web address]     COVID-19 10/06/2021    Depression     Diverticulitis     GERD (gastroesophageal reflux disease)     Hyperlipidemia     Hypertension     Nonischemic cardiomyopathy (HCC)     Panic attacks     Sleep apnea     uses Cpap nightly       Past Surgical History:  Past Surgical History:   Procedure Laterality Date    CARDIAC DEFIBRILLATOR PLACEMENT Left 01/27/2017    [employer] single lead ICD,     DIAGNOSTIC CARDIAC CATH LAB PROCEDURE  2015    [facility]     [facility] COLONOSCOPY FLX DX W/COLLJ SPEC WHEN PFRMD N/A 9/6/2018    COLONOSCOPY performed by Danie Sherman MD at [facility] ENDOSCOPY       Family History:  Family History   Problem Relation Age of Onset    Cancer Father     No Known Problems Mother     Other Sister         Heart murmur       Social History:  Social History     Socioeconomic History    Marital status: Single     Spouse name: Not on file    Number of children: Not on file    Years of education: Not on file    Highest education level: Not on file   Occupational History    Not on file   Tobacco Use    Smoking status: Never Smoker    Smokeless tobacco: Never Used    Tobacco comment: Patient counseled to remain smoke free   Vaping Use    Vaping Use: Never used   Substance and Sexual Activity    Alcohol use: Yes     Comment: 2-3 times monthly. coffee rarely     Drug use: Yes     Types: Marijuana Fredick Copping)     Comment: Occasionallu    Sexual activity: Yes     Partners: Female     Comment:  for 7 years   Other Topics Concern    Not on file   Social History Narrative    Not on file     Social Determinants of Health     Financial Resource Strain: Low Risk     Difficulty of Paying Living Expenses: Not hard at all   Food Insecurity: No Food Insecurity    Worried About [address] in the Last Year: Never true    [facility] in the Last Year: Never true   Transportation Needs: No Transportation Needs    Lack of Transportation (Medical): No    Lack of Transportation (Non-Medical):  No   Physical Activity:     Days of Exercise per Week: Not on file    Minutes of Exercise per Session: Not on file   Stress:     Feeling of Stress : Not on file   Social Connections:     Frequency of Communication with Friends and Family: Not on file    Frequency of Social Gatherings with Friends and Family: Not on file    Attends Yazdanism Services: Not on file    Active Member of [address] [place] [address] or Organizations: Not on file    Attends Club or Organization Meetings: Not on file    Marital Status: Not on file   Intimate Partner Violence:     Fear of Current or Ex-Partner: Not on file    Emotionally Abused: Not on file    Physically Abused: Not on file    Sexually Abused: Not on file   Housing Stability:     Unable to Pay for Housing in the Last Year: Not on file    Number of Places Lived in the Last Year: Not on file    Unstable Housing in the Last Year: Not on file       Allergies:  No Known Allergies    Current Medications:  Current Outpatient Medications   Medication Sig Dispense Refill    spironolactone (ALDACTONE) 25 MG tablet TAKE ONE-HALF TABLET BY MOUTH DAILY      atorvastatin (LIPITOR) 20 MG tablet TAKE ONE TABLET BY MOUTH EVERY DAY      furosemide (LASIX) 40 MG tablet TAKE ONE TABLET BY MOUTH EVERY DAY 90 tablet 3    aspirin (ASPIRIN LOW DOSE) 81 MG chewable tablet Take 1 tablet by mouth daily 90 tablet 3    hydrALAZINE (APRESOLINE) 25 MG tablet TAKE THREE (3) TABLETS BY MOUTH THREE TIMES DAILY 270 tablet 3    ivabradine (CORLANOR) 5 MG TABS tablet Take 1 tablet by mouth 2 times daily (with meals) 180 tablet 3    isosorbide mononitrate (IMDUR) 120 MG extended release tablet TAKE ONE TABLET BY MOUTH EVERY DAY 90 tablet 3    famotidine (PEPCID) 20 MG tablet Take 1 tablet by mouth 2 times daily 60 tablet 0    ascorbic acid (VITAMIN C) 1000 MG tablet Take 0.5 tablets by mouth 2 times daily 30 tablet 3    Cholecalciferol (VITAMIN D) 25 MCG TABS Take 1 tablet by mouth daily 60 tablet 0    zinc sulfate (ZINCATE) 220 (50 Zn) MG capsule Take 1 capsule by mouth 2 times daily 30 capsule 3    carvedilol (COREG) 25 MG tablet Take 1 tablet by mouth 2 times daily 180 tablet 3    CPAP Machine MISC by Does not apply route nightly      albuterol sulfate HFA (PROVENTIL HFA) 108 (90 BASE) MCG/ACT inhaler Inhale 2 puffs into the lungs every 6 hours as needed for Wheezing 1 Inhaler 0     No current facility-administered medications for this visit. Physical Exam:  /82   Pulse 55   Resp 16   Ht 5' 8\" (1.727 m)   Wt 204 lb (92.5 kg)   BMI 31.02 kg/m²   Wt Readings from Last 3 Encounters:   01/28/22 204 lb (92.5 kg)   10/19/21 199 lb 3.2 oz (90.4 kg)   10/06/21 214 lb (97.1 kg)     The patient is awake, alert and in no discomfort or distress. No gross musculoskeletal deformity is present. No significant skin or nail changes are present. Gross examination of head, eyes, nose and throat are negative. Jugular venous pressure is normal and no carotid bruits are present. Normal respiratory effort is noted with no accessory muscle usage present. Lung fields are clear to ascultation. Cardiac examination is notable for a regular rate and rhythm with no palpable thrill. No gallop rhythm or cardiac murmur are identified. A benign abdominal examination is present with no masses or organomegaly. Intact pulses are present throughout all extremities and no peripheral edema is present. No focal neurologic deficits are present. Laboratory Tests:  Lab Results   Component Value Date    CREATININE 2.3 (H) 10/17/2021    BUN 47 (H) 10/17/2021     10/17/2021    K 5.1 (H) 10/17/2021    CL 99 10/17/2021    CO2 25 10/17/2021     No results found for: BNP  Lab Results   Component Value Date    WBC 20.9 10/17/2021    RBC 5.93 10/17/2021    HGB 16.6 10/17/2021    HCT 50.7 10/17/2021    MCV 85.5 10/17/2021    MCH 28.0 10/17/2021    MCHC 32.7 10/17/2021    RDW 14.0 10/17/2021     10/17/2021    MPV 10.0 10/17/2021     No results for input(s): ALKPHOS, ALT, AST, PROT, BILITOT, BILIDIR, LABALBU in the last 72 hours.   Lab Results   Component Value Date    MG 2.0 08/24/2021     Lab Results   Component Value Date    PROTIME 12.2 01/28/2017    INR 1.1 01/28/2017     Lab Results   Component Value Date    TSH 1.070 08/27/2015     No components found for: CHLPL  Lab Results   Component Value Date    TRIG 257 (H) 08/25/2021    TRIG 258 (H) 08/25/2021    TRIG 132 10/03/2017     Lab Results   Component Value Date    HDL 29 08/25/2021    HDL 28 08/25/2021    HDL 39 10/03/2017     Lab Results   Component Value Date    LDLCALC 62 08/25/2021    LDLCALC 60 08/25/2021    LDLCALC 134 (H) 10/03/2017       Cardiac Tests:  ECG: A resting electrocardiogram demonstrates evidence of sinus bradycardia with low voltage within the limb leads and nonspecific ST changes      ASSESSMENT / PLAN: On a clinical basis, the patient remains compensated from a cardiovascular standpoint the face of his nonischemic cardiomyopathy with no present complications and appropriate compliance with therapy. I have encouraged continued appropriate lifestyle modification to maintain the present weight reduction to benefit the management of the diastolic component of his heart failure as well as that of his obstructive sleep apnea. Continued monitoring of his blood pressure will be necessary and if elevations are noted the potential for further modification of his hydralazine dosage. Ongoing aggressive risk factor modification of blood pressure and serum lipids will remain essential to reducing risk of future atherosclerotic development. I presently plan his clinical reassessment approximately 1 year and would happily reassess him in the interim should additional cardiovascular difficulties or concerns arise with ongoing monitoring of his implantable cardioverter defibrillator deferred to the electrophysiology service. The patient's current medication list, allergies, problem list and results of all previously ordered testing were reviewed at today's visit.     Follow-up office visit in 1 year      Note: This report was completed using computerized voice recognition software. Every effort has been made to ensure accuracy, however; inadvertent computerized transcription errors may be present. Rene Aguilar.  Vicky Shaffer, [address] [place] [address]    An electronic copy of this follow-up progress note was forwarded to Dr. Angel Vazquez and Fabiola Kelly

## 2022-02-15 ENCOUNTER — TELEPHONE (OUTPATIENT)
Dept: NON INVASIVE DIAGNOSTICS | Age: 47
End: 2022-02-15

## 2022-02-15 NOTE — TELEPHONE ENCOUNTER
Spoke with patient regarding new cellular adapter being mailed from [employer]. Instructed for patient to hook up new cellular adapter once they receive it. Address confirmed. Patient voiced understanding. Cellular adapter ordered.      Chyna Covarrubias

## 2022-02-22 ENCOUNTER — HOSPITAL ENCOUNTER (OUTPATIENT)
Age: 47
Discharge: HOME | End: 2022-02-22
Payer: MEDICAID

## 2022-02-22 LAB
ALBUMIN SERPL-MCNC: 4.1 G/DL (ref 3.5–5.2)
ALP BLD-CCNC: 109 U/L (ref 40–129)
ALT SERPL-CCNC: 22 U/L (ref 0–40)
ANION GAP SERPL CALCULATED.3IONS-SCNC: 11 MMOL/L (ref 7–16)
AST SERPL-CCNC: 17 U/L (ref 0–39)
BILIRUB SERPL-MCNC: 0.3 MG/DL (ref 0–1.2)
BUN BLDV-MCNC: 11 MG/DL (ref 6–20)
CALCIUM SERPL-MCNC: 9.4 MG/DL (ref 8.6–10.2)
CHLORIDE BLD-SCNC: 101 MMOL/L (ref 98–107)
CO2: 27 MMOL/L (ref 22–29)
CREAT SERPL-MCNC: 1.9 MG/DL (ref 0.7–1.2)
GFR AFRICAN AMERICAN: 46
GFR NON-AFRICAN AMERICAN: 46 ML/MIN/1.73
GLUCOSE BLD-MCNC: 139 MG/DL (ref 74–99)
HCT VFR BLD CALC: 45.4 % (ref 37–54)
HEMOGLOBIN: 14.4 G/DL (ref 12.5–16.5)
MCH RBC QN AUTO: 28.1 PG (ref 26–35)
MCHC RBC AUTO-ENTMCNC: 31.7 % (ref 32–34.5)
MCV RBC AUTO: 88.7 FL (ref 80–99.9)
PARATHYROID HORMONE INTACT: 110 PG/ML (ref 15–65)
PDW BLD-RTO: 13.1 FL (ref 11.5–15)
PHOSPHORUS: 2.7 MG/DL (ref 2.5–4.5)
PLATELET # BLD: 310 E9/L (ref 130–450)
PMV BLD AUTO: 10.3 FL (ref 7–12)
POTASSIUM SERPL-SCNC: 3.6 MMOL/L (ref 3.5–5)
RBC # BLD: 5.12 E12/L (ref 3.8–5.8)
SODIUM BLD-SCNC: 139 MMOL/L (ref 132–146)
TOTAL PROTEIN: 7.3 G/DL (ref 6.4–8.3)
VITAMIN D 25-HYDROXY: 22 NG/ML (ref 30–100)
WBC # BLD: 8.7 E9/L (ref 4.5–11.5)

## 2022-02-22 PROCEDURE — 82306 VITAMIN D 25 HYDROXY: CPT

## 2022-02-22 PROCEDURE — 85027 COMPLETE CBC AUTOMATED: CPT

## 2022-02-22 PROCEDURE — 83970 ASSAY OF PARATHORMONE: CPT

## 2022-02-22 PROCEDURE — 36415 COLL VENOUS BLD VENIPUNCTURE: CPT

## 2022-02-22 PROCEDURE — 84100 ASSAY OF PHOSPHORUS: CPT

## 2022-02-22 PROCEDURE — 80053 COMPREHEN METABOLIC PANEL: CPT

## 2022-03-17 ENCOUNTER — TELEPHONE (OUTPATIENT)
Dept: FAMILY MEDICINE CLINIC | Age: 47
End: 2022-03-17

## 2022-03-17 NOTE — TELEPHONE ENCOUNTER
Will call Anirudh first thing in the morning to find out what exact supplies he needs and will send info to Dr. Cierra Cuevas to place order.

## 2022-03-17 NOTE — TELEPHONE ENCOUNTER
Patient needs to check with his CPAP supplier so that we can order the specific facemask and feel treatment he needs.

## 2022-03-17 NOTE — TELEPHONE ENCOUNTER
Patient called wanting to know if he can get an order for a new face mask and filter for his cpap. Please advise.

## 2022-03-31 RX ORDER — SPIRONOLACTONE 25 MG/1
TABLET ORAL
Qty: 30 TABLET | Refills: 5 | Status: SHIPPED | OUTPATIENT
Start: 2022-03-31

## 2022-04-11 RX ORDER — ATORVASTATIN CALCIUM 20 MG/1
TABLET, FILM COATED ORAL
Qty: 90 TABLET | Refills: 3 | Status: SHIPPED
Start: 2022-04-11 | End: 2022-09-14 | Stop reason: SDUPTHER

## 2022-04-27 RX ORDER — HYDRALAZINE HYDROCHLORIDE 25 MG/1
TABLET, FILM COATED ORAL
Qty: 270 TABLET | Refills: 3 | Status: SHIPPED
Start: 2022-04-27 | End: 2022-09-12

## 2022-04-29 RX ORDER — HYDRALAZINE HYDROCHLORIDE 25 MG/1
TABLET, FILM COATED ORAL
Qty: 270 TABLET | Refills: 0 | OUTPATIENT
Start: 2022-04-29

## 2022-09-12 RX ORDER — HYDRALAZINE HYDROCHLORIDE 25 MG/1
TABLET, FILM COATED ORAL
Qty: 270 TABLET | Refills: 0 | Status: SHIPPED
Start: 2022-09-12 | End: 2022-09-13

## 2022-09-13 RX ORDER — HYDRALAZINE HYDROCHLORIDE 25 MG/1
TABLET, FILM COATED ORAL
Qty: 270 TABLET | Refills: 3 | Status: SHIPPED | OUTPATIENT
Start: 2022-09-13

## 2022-09-14 RX ORDER — FUROSEMIDE 40 MG/1
TABLET ORAL
Qty: 90 TABLET | Refills: 3 | Status: SHIPPED | OUTPATIENT
Start: 2022-09-14

## 2022-09-14 RX ORDER — ISOSORBIDE MONONITRATE 120 MG/1
TABLET, EXTENDED RELEASE ORAL
Qty: 90 TABLET | Refills: 3 | Status: SHIPPED | OUTPATIENT
Start: 2022-09-14

## 2022-09-14 RX ORDER — CARVEDILOL 25 MG/1
25 TABLET ORAL 2 TIMES DAILY
Qty: 180 TABLET | Refills: 3 | Status: SHIPPED | OUTPATIENT
Start: 2022-09-14

## 2022-09-14 RX ORDER — ATORVASTATIN CALCIUM 20 MG/1
TABLET, FILM COATED ORAL
Qty: 90 TABLET | Refills: 3 | Status: SHIPPED | OUTPATIENT
Start: 2022-09-14

## 2022-10-18 ENCOUNTER — OFFICE VISIT (OUTPATIENT)
Dept: FAMILY MEDICINE CLINIC | Age: 47
End: 2022-10-18
Payer: MEDICAID

## 2022-10-18 VITALS
BODY MASS INDEX: 31.98 KG/M2 | TEMPERATURE: 98.1 F | HEIGHT: 68 IN | OXYGEN SATURATION: 96 % | WEIGHT: 211 LBS | RESPIRATION RATE: 16 BRPM | DIASTOLIC BLOOD PRESSURE: 82 MMHG | SYSTOLIC BLOOD PRESSURE: 122 MMHG | HEART RATE: 65 BPM

## 2022-10-18 DIAGNOSIS — I25.10 CORONARY ARTERY DISEASE INVOLVING NATIVE CORONARY ARTERY OF NATIVE HEART WITHOUT ANGINA PECTORIS: ICD-10-CM

## 2022-10-18 DIAGNOSIS — I10 ESSENTIAL HYPERTENSION: Primary | ICD-10-CM

## 2022-10-18 DIAGNOSIS — I50.32 CHRONIC HEART FAILURE WITH PRESERVED EJECTION FRACTION (HCC): ICD-10-CM

## 2022-10-18 DIAGNOSIS — G47.30 SLEEP APNEA, UNSPECIFIED TYPE: ICD-10-CM

## 2022-10-18 DIAGNOSIS — Z13.1 DIABETES MELLITUS SCREENING: ICD-10-CM

## 2022-10-18 LAB — HBA1C MFR BLD: 5.8 %

## 2022-10-18 PROCEDURE — 83036 HEMOGLOBIN GLYCOSYLATED A1C: CPT | Performed by: FAMILY MEDICINE

## 2022-10-18 PROCEDURE — 1036F TOBACCO NON-USER: CPT | Performed by: FAMILY MEDICINE

## 2022-10-18 PROCEDURE — G8484 FLU IMMUNIZE NO ADMIN: HCPCS | Performed by: FAMILY MEDICINE

## 2022-10-18 PROCEDURE — G8427 DOCREV CUR MEDS BY ELIG CLIN: HCPCS | Performed by: FAMILY MEDICINE

## 2022-10-18 PROCEDURE — 99214 OFFICE O/P EST MOD 30 MIN: CPT | Performed by: FAMILY MEDICINE

## 2022-10-18 PROCEDURE — G8417 CALC BMI ABV UP PARAM F/U: HCPCS | Performed by: FAMILY MEDICINE

## 2022-10-18 SDOH — ECONOMIC STABILITY: FOOD INSECURITY: WITHIN THE PAST 12 MONTHS, YOU WORRIED THAT YOUR FOOD WOULD RUN OUT BEFORE YOU GOT MONEY TO BUY MORE.: NEVER TRUE

## 2022-10-18 SDOH — ECONOMIC STABILITY: FOOD INSECURITY: WITHIN THE PAST 12 MONTHS, THE FOOD YOU BOUGHT JUST DIDN'T LAST AND YOU DIDN'T HAVE MONEY TO GET MORE.: NEVER TRUE

## 2022-10-18 ASSESSMENT — LIFESTYLE VARIABLES
HOW OFTEN DO YOU HAVE A DRINK CONTAINING ALCOHOL: 2-4 TIMES A MONTH
HOW MANY STANDARD DRINKS CONTAINING ALCOHOL DO YOU HAVE ON A TYPICAL DAY: 1 OR 2

## 2022-10-18 ASSESSMENT — PATIENT HEALTH QUESTIONNAIRE - PHQ9
SUM OF ALL RESPONSES TO PHQ QUESTIONS 1-9: 1
1. LITTLE INTEREST OR PLEASURE IN DOING THINGS: 0
SUM OF ALL RESPONSES TO PHQ QUESTIONS 1-9: 1
2. FEELING DOWN, DEPRESSED OR HOPELESS: 1
SUM OF ALL RESPONSES TO PHQ9 QUESTIONS 1 & 2: 1

## 2022-10-18 ASSESSMENT — SOCIAL DETERMINANTS OF HEALTH (SDOH): HOW HARD IS IT FOR YOU TO PAY FOR THE VERY BASICS LIKE FOOD, HOUSING, MEDICAL CARE, AND HEATING?: NOT HARD AT ALL

## 2022-10-18 NOTE — PROGRESS NOTES
10/23/2022    Radha Mejia    Chief Complaint   Patient presents with    Hypertension     6 month follow up. Sleep Apnea     Patient states insurance was saying they need authorization for new cpap machine. HPI  History was obtained from patient. Hero Carl is a 55 y.o. male who presents today with the followin. Essential hypertension    2. Diabetes mellitus screening    3. Chronic heart failure with preserved ejection fraction ([facility]    4. Coronary artery disease involving native coronary artery of native heart without angina pectoris    5. Sleep apnea, unspecified type       Patient is here for follow-up of hypertension coronary artery disease and sleep apnea. Patient states he is taking all of his medications as prescribed. Patient will need a new sleep study so we can continue to be updated with his CPAP. REVIEW OF SYMPTOMS    Review of Systems   Constitutional:  Negative for chills, fatigue and fever. HENT:  Negative for congestion, mouth sores, postnasal drip, rhinorrhea and sinus pain. Eyes:  Negative for photophobia, discharge and redness. Respiratory:  Negative for cough and shortness of breath. Cardiovascular:  Negative for chest pain. Gastrointestinal: Negative. Genitourinary:  Negative for difficulty urinating, dysuria, hematuria and urgency. Neurological:  Negative for headaches. Psychiatric/Behavioral:  Negative for sleep disturbance.       PAST MEDICAL HISTORY  Past Medical History:   Diagnosis Date    AICD (automatic cardioverter/defibrillator) present     Asthma     CAD (coronary artery disease)     Cardiac LV ejection fraction 10-20%     CHF (congestive heart failure) (HCC)     CKD (chronic kidney disease)     COPD (chronic obstructive pulmonary disease) ([facility]     COVID-19 10/06/2021    Depression     Diverticulitis     GERD (gastroesophageal reflux disease)     Hyperlipidemia     Hypertension     Nonischemic cardiomyopathy (HCC)     Panic attacks     Sleep apnea uses Cpap nightly       FAMILY HISTORY  Family History   Problem Relation Age of Onset    Cancer Father     No Known Problems Mother     Other Sister         Heart murmur       SOCIAL HISTORY  Social History     Socioeconomic History    Marital status: Single     Spouse name: None    Number of children: None    Years of education: None    Highest education level: None   Tobacco Use    Smoking status: Never    Smokeless tobacco: Never    Tobacco comments:     Patient counseled to remain smoke free   Vaping Use    Vaping Use: Never used   Substance and Sexual Activity    Alcohol use: Yes     Comment: 2-3 times monthly.   coffee rarely     Drug use: Yes     Types: Marijuana Seabron Barban)     Comment: Occasionallu    Sexual activity: Yes     Partners: Female     Comment:  for 7 years     Social Determinants of Health     Financial Resource Strain: Low Risk     Difficulty of Paying Living Expenses: Not hard at all   Food Insecurity: No Food Insecurity    Worried About [address] in the Last Year: Never true    [facility] in the Last Year: Never true        SURGICAL HISTORY  Past Surgical History:   Procedure Laterality Date    CARDIAC DEFIBRILLATOR PLACEMENT Left 01/27/2017    [facility] [place] [facility] single lead ICD,     DIAGNOSTIC CARDIAC CATH LAB PROCEDURE  2015    [facility]     [place] COLONOSCOPY FLX DX W/COLLJ SPEC WHEN PFRMD N/A 9/6/2018    COLONOSCOPY performed by Rl Gomez MD at [place] [id number]  Current Outpatient Medications   Medication Sig Dispense Refill    carvedilol (COREG) 25 MG tablet Take 1 tablet by mouth 2 times daily 180 tablet 3    atorvastatin (LIPITOR) 20 MG tablet TAKE ONE TABLET BY MOUTH EVERY DAY 90 tablet 3    furosemide (LASIX) 40 MG tablet TAKE ONE TABLET BY MOUTH EVERY DAY 90 tablet 3    isosorbide mononitrate (IMDUR) 120 MG extended release tablet TAKE ONE TABLET BY MOUTH EVERY DAY 90 tablet 3    hydrALAZINE (APRESOLINE) 25 MG tablet TAKE THREE (3) TABLETS BY MOUTH THREE TIMES DAILY 270 tablet 3    spironolactone (ALDACTONE) 25 MG tablet TAKE ONE-HALF TABLET BY MOUTH DAILY 30 tablet 5    aspirin (ASPIRIN LOW DOSE) 81 MG chewable tablet Take 1 tablet by mouth daily 90 tablet 3    ivabradine (CORLANOR) 5 MG TABS tablet Take 1 tablet by mouth 2 times daily (with meals) 180 tablet 3    famotidine (PEPCID) 20 MG tablet Take 1 tablet by mouth 2 times daily 60 tablet 0    ascorbic acid (VITAMIN C) 1000 MG tablet Take 0.5 tablets by mouth 2 times daily 30 tablet 3    Cholecalciferol (VITAMIN D) 25 MCG TABS Take 1 tablet by mouth daily 60 tablet 0    zinc sulfate (ZINCATE) 220 (50 Zn) MG capsule Take 1 capsule by mouth 2 times daily 30 capsule 3    CPAP Machine MISC by Does not apply route nightly      albuterol sulfate HFA (PROVENTIL HFA) 108 (90 BASE) MCG/ACT inhaler Inhale 2 puffs into the lungs every 6 hours as needed for Wheezing 1 Inhaler 0     No current facility-administered medications for this visit. ALLERGIES  No Known Allergies    PHYSICAL EXAM    /82   Pulse 65   Temp 98.1 °F (36.7 °C)   Resp 16   Ht 5' 8\" (1.727 m)   Wt 211 lb (95.7 kg)   SpO2 96%   BMI 32.08 kg/m²     Physical Exam  Vitals and nursing note reviewed. Constitutional:       Appearance: Normal appearance. HENT:      Head: Normocephalic and atraumatic. Eyes:      General: No scleral icterus. Conjunctiva/sclera: Conjunctivae normal.   Neurological:      Mental Status: He is alert and oriented to person, place, and time. Psychiatric:         Mood and Affect: Mood normal.       ASSESSMENT & PLAN    Dwight Pitts was seen today for hypertension and sleep apnea. Diagnoses and all orders for this visit:    Essential hypertension  Patient's blood pressure is well controlled on Lasix hydralazine spironolactone and carvedilol. Diabetes mellitus screening  -     POCT glycosylated hemoglobin (Hb A1C)  Patient's hemoglobin A1c is 5.8 today.   Patient is continue with a diabetic diet. No prescription medications for this problem at this time. Chronic heart failure with preserved ejection fraction [facility])  Patient will continue with his current blood pressure medicines as listed above. Patient will continue to follow with cardiology    Coronary artery disease involving native coronary artery of native heart without angina pectoris  Patient will continue to follow with cardiology. He is currently taking isosorbide mononitrate his blood pressure medicine and his cholesterol medicine. Sleep apnea, unspecified type  -     [facility] Sleep Medicine  Patient is referred to sleep medicine for further evaluation of his sleep apnea. Return in about 2 months (around 12/18/2022). Electronically signed by Alexsander Durán.  Franklin Birmingham DO on 10/23/2022

## 2022-10-23 ASSESSMENT — ENCOUNTER SYMPTOMS
SHORTNESS OF BREATH: 0
EYE DISCHARGE: 0
SINUS PAIN: 0
RHINORRHEA: 0
GASTROINTESTINAL NEGATIVE: 1
EYE REDNESS: 0
PHOTOPHOBIA: 0
COUGH: 0

## 2022-12-03 ENCOUNTER — HOSPITAL ENCOUNTER (EMERGENCY)
Age: 47
Discharge: HOME | End: 2022-12-03
Attending: FAMILY MEDICINE
Payer: MEDICAID

## 2022-12-03 ENCOUNTER — APPOINTMENT (OUTPATIENT)
Dept: CT IMAGING | Age: 47
End: 2022-12-03
Payer: MEDICAID

## 2022-12-03 VITALS
RESPIRATION RATE: 16 BRPM | OXYGEN SATURATION: 98 % | TEMPERATURE: 97.1 F | SYSTOLIC BLOOD PRESSURE: 120 MMHG | BODY MASS INDEX: 32.43 KG/M2 | HEIGHT: 68 IN | WEIGHT: 214 LBS | DIASTOLIC BLOOD PRESSURE: 84 MMHG | HEART RATE: 60 BPM

## 2022-12-03 DIAGNOSIS — S09.90XA INJURY OF HEAD, INITIAL ENCOUNTER: Primary | ICD-10-CM

## 2022-12-03 DIAGNOSIS — J06.9 ACUTE UPPER RESPIRATORY INFECTION: ICD-10-CM

## 2022-12-03 PROCEDURE — 70450 CT HEAD/BRAIN W/O DYE: CPT

## 2022-12-03 PROCEDURE — 99284 EMERGENCY DEPT VISIT MOD MDM: CPT

## 2022-12-03 RX ORDER — ECHINACEA PURPUREA EXTRACT 125 MG
1 TABLET ORAL PRN
Qty: 88 ML | Refills: 0 | Status: SHIPPED | OUTPATIENT
Start: 2022-12-03

## 2022-12-03 RX ORDER — GUAIFENESIN AND DEXTROMETHORPHAN HYDROBROMIDE 1200; 60 MG/1; MG/1
1 TABLET, EXTENDED RELEASE ORAL 2 TIMES DAILY
Qty: 28 TABLET | Refills: 0 | Status: SHIPPED | OUTPATIENT
Start: 2022-12-03

## 2022-12-03 ASSESSMENT — PAIN SCALES - GENERAL: PAINLEVEL_OUTOF10: 6

## 2022-12-03 ASSESSMENT — PAIN DESCRIPTION - FREQUENCY: FREQUENCY: CONTINUOUS

## 2022-12-03 ASSESSMENT — PAIN DESCRIPTION - LOCATION: LOCATION: HEAD

## 2022-12-03 ASSESSMENT — PAIN DESCRIPTION - PAIN TYPE: TYPE: ACUTE PAIN

## 2022-12-03 ASSESSMENT — PAIN DESCRIPTION - DESCRIPTORS: DESCRIPTORS: SORE

## 2022-12-03 ASSESSMENT — PAIN - FUNCTIONAL ASSESSMENT: PAIN_FUNCTIONAL_ASSESSMENT: 0-10
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
## 2022-12-03 NOTE — ED NOTES
CT report returned and patient was given the test results by phone. Patient was given discharge instructions Verbalized understanding.      Tanisha Stack RN  12/03/22 [id number]
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
## 2023-01-13 RX ORDER — ASPIRIN 81 MG
TABLET,CHEWABLE ORAL
Qty: 90 TABLET | Refills: 0 | Status: SHIPPED | OUTPATIENT
Start: 2023-01-13

## 2023-01-20 RX ORDER — IVABRADINE 5 MG/1
TABLET, FILM COATED ORAL
Qty: 180 TABLET | Refills: 0 | Status: SHIPPED | OUTPATIENT
Start: 2023-01-20

## 2023-02-16 RX ORDER — HYDRALAZINE HYDROCHLORIDE 25 MG/1
TABLET, FILM COATED ORAL
Qty: 270 TABLET | Refills: 0 | OUTPATIENT
Start: 2023-02-16

## 2023-02-17 RX ORDER — HYDRALAZINE HYDROCHLORIDE 25 MG/1
TABLET, FILM COATED ORAL
Qty: 270 TABLET | Refills: 3 | Status: CANCELLED | OUTPATIENT
Start: 2023-02-17

## 2023-02-21 RX ORDER — HYDRALAZINE HYDROCHLORIDE 25 MG/1
TABLET, FILM COATED ORAL
Qty: 270 TABLET | Refills: 0 | OUTPATIENT
Start: 2023-02-21

## 2023-02-22 RX ORDER — HYDRALAZINE HYDROCHLORIDE 25 MG/1
TABLET, FILM COATED ORAL
Qty: 270 TABLET | Refills: 3 | OUTPATIENT
Start: 2023-02-22

## 2023-02-22 RX ORDER — HYDRALAZINE HYDROCHLORIDE 25 MG/1
TABLET, FILM COATED ORAL
Qty: 270 TABLET | Refills: 0 | Status: SHIPPED | OUTPATIENT
Start: 2023-02-22

## 2023-02-22 NOTE — TELEPHONE ENCOUNTER
Patient called for refill, patient is currently out of this medication    Last seen 10/18/2022  Next appt 3/7/2023  [facility]

## 2023-02-24 NOTE — TELEPHONE ENCOUNTER
LMOM about medication being called into his pharmacy. I let him know to call the pharmacy first to have them get it ready for .

## 2023-03-07 ENCOUNTER — OFFICE VISIT (OUTPATIENT)
Dept: FAMILY MEDICINE CLINIC | Age: 48
End: 2023-03-07
Payer: MEDICAID

## 2023-03-07 VITALS
HEART RATE: 73 BPM | OXYGEN SATURATION: 96 % | SYSTOLIC BLOOD PRESSURE: 128 MMHG | BODY MASS INDEX: 32.39 KG/M2 | TEMPERATURE: 97 F | WEIGHT: 213 LBS | DIASTOLIC BLOOD PRESSURE: 96 MMHG

## 2023-03-07 DIAGNOSIS — J30.9 ALLERGIC RHINITIS, UNSPECIFIED SEASONALITY, UNSPECIFIED TRIGGER: Primary | ICD-10-CM

## 2023-03-07 PROCEDURE — 99213 OFFICE O/P EST LOW 20 MIN: CPT | Performed by: FAMILY MEDICINE

## 2023-03-07 PROCEDURE — G8417 CALC BMI ABV UP PARAM F/U: HCPCS | Performed by: FAMILY MEDICINE

## 2023-03-07 PROCEDURE — G8427 DOCREV CUR MEDS BY ELIG CLIN: HCPCS | Performed by: FAMILY MEDICINE

## 2023-03-07 PROCEDURE — 3078F DIAST BP <80 MM HG: CPT | Performed by: FAMILY MEDICINE

## 2023-03-07 PROCEDURE — 3074F SYST BP LT 130 MM HG: CPT | Performed by: FAMILY MEDICINE

## 2023-03-07 PROCEDURE — 1036F TOBACCO NON-USER: CPT | Performed by: FAMILY MEDICINE

## 2023-03-07 PROCEDURE — G8484 FLU IMMUNIZE NO ADMIN: HCPCS | Performed by: FAMILY MEDICINE

## 2023-03-07 RX ORDER — LORATADINE 10 MG/1
10 TABLET ORAL DAILY
Qty: 90 TABLET | Refills: 1 | Status: SHIPPED | OUTPATIENT
Start: 2023-03-07

## 2023-03-07 RX ORDER — FLUTICASONE PROPIONATE 50 MCG
2 SPRAY, SUSPENSION (ML) NASAL DAILY
Qty: 16 G | Refills: 0 | Status: SHIPPED | OUTPATIENT
Start: 2023-03-07

## 2023-03-07 SDOH — ECONOMIC STABILITY: FOOD INSECURITY: WITHIN THE PAST 12 MONTHS, YOU WORRIED THAT YOUR FOOD WOULD RUN OUT BEFORE YOU GOT MONEY TO BUY MORE.: NEVER TRUE

## 2023-03-07 SDOH — ECONOMIC STABILITY: HOUSING INSECURITY
IN THE LAST 12 MONTHS, WAS THERE A TIME WHEN YOU DID NOT HAVE A STEADY PLACE TO SLEEP OR SLEPT IN A SHELTER (INCLUDING NOW)?: NO

## 2023-03-07 SDOH — ECONOMIC STABILITY: FOOD INSECURITY: WITHIN THE PAST 12 MONTHS, THE FOOD YOU BOUGHT JUST DIDN'T LAST AND YOU DIDN'T HAVE MONEY TO GET MORE.: NEVER TRUE

## 2023-03-07 SDOH — ECONOMIC STABILITY: INCOME INSECURITY: HOW HARD IS IT FOR YOU TO PAY FOR THE VERY BASICS LIKE FOOD, HOUSING, MEDICAL CARE, AND HEATING?: NOT HARD AT ALL

## 2023-03-07 ASSESSMENT — PATIENT HEALTH QUESTIONNAIRE - PHQ9
SUM OF ALL RESPONSES TO PHQ QUESTIONS 1-9: 0
1. LITTLE INTEREST OR PLEASURE IN DOING THINGS: 0
2. FEELING DOWN, DEPRESSED OR HOPELESS: 0
SUM OF ALL RESPONSES TO PHQ QUESTIONS 1-9: 0
SUM OF ALL RESPONSES TO PHQ9 QUESTIONS 1 & 2: 0

## 2023-03-07 NOTE — PROGRESS NOTES
3/10/2023    [facility]    Chief Complaint   Patient presents with    Sinus Problem     Here c/o sinus congestion with drainage, States mucinex did not help. HPI  History was obtained from patient. Aj Ramírez is a 52 y.o. male who presents today with the followin. Allergic rhinitis, unspecified seasonality, unspecified trigger    Patient presents with sinus drainage. He has tried Mucinex without any relief. Patient has no fever sweats or chills. He has no significant cough or shortness of breath. REVIEW OF SYMPTOMS    Review of Systems   Constitutional:  Negative for chills, fatigue and fever. HENT:  Positive for postnasal drip and rhinorrhea. Negative for congestion, mouth sores and sinus pain. Eyes:  Negative for photophobia, discharge and redness. Respiratory:  Negative for cough and shortness of breath. Cardiovascular:  Negative for chest pain. Gastrointestinal: Negative. Genitourinary:  Negative for difficulty urinating, dysuria, hematuria and urgency. Neurological:  Negative for headaches. Psychiatric/Behavioral:  Negative for sleep disturbance.       PAST MEDICAL HISTORY  Past Medical History:   Diagnosis Date    AICD (automatic cardioverter/defibrillator) present     Asthma     CAD (coronary artery disease)     Cardiac LV ejection fraction 10-20%     CHF (congestive heart failure) (HCC)     CKD (chronic kidney disease)     COPD (chronic obstructive pulmonary disease) ([facility] [address]     COVID-19 10/06/2021    Depression     Diverticulitis     GERD (gastroesophageal reflux disease)     Hyperlipidemia     Hypertension     Nonischemic cardiomyopathy (HCC)     Panic attacks     Sleep apnea     uses Cpap nightly       FAMILY HISTORY  Family History   Problem Relation Age of Onset    Cancer Father     No Known Problems Mother     Other Sister         Heart murmur       SOCIAL HISTORY  Social History     Socioeconomic History    Marital status: Single     Spouse name: None    Number of children: None    Years of education: None    Highest education level: None   Tobacco Use    Smoking status: Never    Smokeless tobacco: Never    Tobacco comments:     Patient counseled to remain smoke free   Vaping Use    Vaping Use: Never used   Substance and Sexual Activity    Alcohol use: Yes     Comment: 2-3 times monthly.   coffee rarely     Drug use: Yes     Types: Marijuana Courtney Elpidio)     Comment: Occasionallu    Sexual activity: Yes     Partners: Female     Comment:  for 7 years     Social Determinants of Health     Financial Resource Strain: Low Risk     Difficulty of Paying Living Expenses: Not hard at all   Food Insecurity: No Food Insecurity    Worried About Running Out of Food in the Last Year: Never true    [address] in the Last Year: Never true   Transportation Needs: Unknown    Lack of Transportation (Non-Medical): No   Housing Stability: Unknown    Unstable Housing in the Last Year: No        SURGICAL HISTORY  Past Surgical History:   Procedure Laterality Date    CARDIAC DEFIBRILLATOR PLACEMENT Left 01/27/2017    [employer] single lead ICD,     DIAGNOSTIC CARDIAC CATH LAB PROCEDURE  2015    [facility]     LA COLONOSCOPY FLX DX W/COLLJ Dulce 1978 PFRMD N/A 9/6/2018    COLONOSCOPY performed by Adis Mcfarlane MD at [facility]  Current Outpatient Medications   Medication Sig Dispense Refill    loratadine (CLARITIN) 10 MG tablet Take 1 tablet by mouth daily 90 tablet 1    fluticasone (FLONASE) 50 MCG/ACT nasal spray 2 sprays by Each Nostril route daily 16 g 0    hydrALAZINE (APRESOLINE) 25 MG tablet TAKE THREE (3) TABLETS BY MOUTH THREE TIMES DAILY 270 tablet 0    CORLANOR 5 MG TABS tablet TAKE ONE TABLET BY MOUTH TWICE A DAY WITH MEALS 180 tablet 0    ASPIRIN LOW DOSE 81 MG chewable tablet CHEW AND SWALLOW ONE TABLET BY MOUTH DAILY 90 tablet 0    sodium chloride (OCEAN) 0.65 % nasal spray 1 spray by Nasal route as needed for Congestion 88 mL 0    carvedilol (COREG) 25 MG tablet Take 1 tablet by mouth 2 times daily 180 tablet 3    atorvastatin (LIPITOR) 20 MG tablet TAKE ONE TABLET BY MOUTH EVERY DAY 90 tablet 3    furosemide (LASIX) 40 MG tablet TAKE ONE TABLET BY MOUTH EVERY DAY 90 tablet 3    isosorbide mononitrate (IMDUR) 120 MG extended release tablet TAKE ONE TABLET BY MOUTH EVERY DAY 90 tablet 3    spironolactone (ALDACTONE) 25 MG tablet TAKE ONE-HALF TABLET BY MOUTH DAILY 30 tablet 5    famotidine (PEPCID) 20 MG tablet Take 1 tablet by mouth 2 times daily 60 tablet 0    albuterol sulfate HFA (PROVENTIL HFA) 108 (90 BASE) MCG/ACT inhaler Inhale 2 puffs into the lungs every 6 hours as needed for Wheezing 1 Inhaler 0    CPAP Machine MISC by Does not apply route nightly (Patient not taking: Reported on 3/7/2023)       No current facility-administered medications for this visit. ALLERGIES  No Known Allergies    PHYSICAL EXAM    BP (!) 128/96   Pulse 73   Temp 97 °F (36.1 °C) (Infrared)   Wt 213 lb (96.6 kg)   SpO2 96%   BMI 32.39 kg/m²     Physical Exam  Vitals and nursing note reviewed. Constitutional:       Appearance: Normal appearance. HENT:      Right Ear: Tympanic membrane and ear canal normal.      Left Ear: Tympanic membrane and ear canal normal.      Nose: Rhinorrhea present. No congestion. Right Sinus: No maxillary sinus tenderness or frontal sinus tenderness. Left Sinus: No maxillary sinus tenderness or frontal sinus tenderness. Mouth/Throat:      Mouth: Mucous membranes are moist.      Pharynx: Oropharynx is clear. No oropharyngeal exudate or posterior oropharyngeal erythema. Eyes:      General: No scleral icterus. Conjunctiva/sclera: Conjunctivae normal.   Cardiovascular:      Rate and Rhythm: Normal rate and regular rhythm. Heart sounds: Normal heart sounds. Pulmonary:      Effort: Pulmonary effort is normal.      Breath sounds: Normal breath sounds.    Musculoskeletal:      Cervical back: Neck supple. Skin:     General: Skin is warm. Neurological:      Mental Status: He is alert and oriented to person, place, and time. Psychiatric:         Mood and Affect: Mood normal.       ASSESSMENT & PLAN    Sierra Navarro was seen today for sinus problem. Diagnoses and all orders for this visit:    Allergic rhinitis, unspecified seasonality, unspecified trigger  -     loratadine (CLARITIN) 10 MG tablet; Take 1 tablet by mouth daily  -     fluticasone (FLONASE) 50 MCG/ACT nasal spray; 2 sprays by Each Nostril route daily  Patient is advised this is rhinorrhea that is most likely not from a bacterial infection. This may be allergic or it may be viral but antibiotics are not indicated. Patient is given prescriptions for symptom relief. Patient is to call if this is ineffective. Return in about 6 months (around 9/7/2023). Electronically signed by Alma Ramon.  Neto Lofton DO on 3/10/2023

## 2023-03-10 ASSESSMENT — ENCOUNTER SYMPTOMS
PHOTOPHOBIA: 0
EYE REDNESS: 0
SINUS PAIN: 0
GASTROINTESTINAL NEGATIVE: 1
RHINORRHEA: 1
COUGH: 0
EYE DISCHARGE: 0
SHORTNESS OF BREATH: 0

## 2023-03-24 RX ORDER — HYDRALAZINE HYDROCHLORIDE 25 MG/1
TABLET, FILM COATED ORAL
Qty: 270 TABLET | Refills: 1 | Status: SHIPPED | OUTPATIENT
Start: 2023-03-24

## 2023-03-31 ENCOUNTER — TELEPHONE (OUTPATIENT)
Dept: FAMILY MEDICINE CLINIC | Age: 48
End: 2023-03-31

## 2023-03-31 NOTE — TELEPHONE ENCOUNTER
----- Message from Rasheed Phoenix sent at 3/31/2023  2:50 PM EDT -----  Subject: Message to Provider    QUESTIONS  Information for Provider? Lo Oliver an RN from [facility], Kidney   management on behalf of Shasta Suarez called. Pt has had CPap machine for 7-8   years. Does he need a new sleep study to get a new machine? Pt is   depressed since his father passed in 2005. He uses marijuana for   depression. Would like referred to a medical marijuana doctor. Lo Oliver,   RN states pt's GA7 score is a 6 and PHQ2 is an 1. She would like progress   notes and labs faxed to [phone]. Any question please call Lo Oliver at   [phone] Ext [phone].  ---------------------------------------------------------------------------  --------------  Essie Ferrari INFO  [phone]; OK to leave message on voicemail  ---------------------------------------------------------------------------  --------------  SCRIPT ANSWERS  Relationship to Patient? Third Party  Third Party Type? Insurance? Representative Name?  Lo Oliver

## 2023-04-03 RX ORDER — SPIRONOLACTONE 25 MG/1
TABLET ORAL
Qty: 30 TABLET | Refills: 0 | OUTPATIENT
Start: 2023-04-03

## 2023-04-03 RX ORDER — ASPIRIN 81 MG
TABLET,CHEWABLE ORAL
Qty: 90 TABLET | Refills: 0 | OUTPATIENT
Start: 2023-04-03

## 2023-04-04 DIAGNOSIS — G47.30 SLEEP APNEA, UNSPECIFIED TYPE: Primary | ICD-10-CM

## 2023-04-04 NOTE — TELEPHONE ENCOUNTER
Referral has been placed for sleep medicine. I do not place referral for medical marijuana doctors.   Patient can check online to see who provides that

## 2023-04-13 ENCOUNTER — HOSPITAL ENCOUNTER (EMERGENCY)
Age: 48
Discharge: HOME | End: 2023-04-13
Attending: FAMILY MEDICINE
Payer: MEDICAID

## 2023-04-13 VITALS
SYSTOLIC BLOOD PRESSURE: 135 MMHG | HEART RATE: 72 BPM | TEMPERATURE: 97.1 F | RESPIRATION RATE: 16 BRPM | DIASTOLIC BLOOD PRESSURE: 82 MMHG | OXYGEN SATURATION: 100 %

## 2023-04-13 DIAGNOSIS — M10.9 ACUTE GOUT INVOLVING TOE OF RIGHT FOOT, UNSPECIFIED CAUSE: Primary | ICD-10-CM

## 2023-04-13 PROCEDURE — 99283 EMERGENCY DEPT VISIT LOW MDM: CPT

## 2023-04-13 RX ORDER — PREDNISONE 20 MG/1
40 TABLET ORAL DAILY
Qty: 8 TABLET | Refills: 0 | Status: SHIPPED | OUTPATIENT
Start: 2023-04-13 | End: 2023-04-17

## 2023-04-13 RX ORDER — COLCHICINE 0.6 MG/1
0.6 TABLET ORAL DAILY
Qty: 5 TABLET | Refills: 0 | Status: SHIPPED | OUTPATIENT
Start: 2023-04-13

## 2023-04-13 ASSESSMENT — PAIN DESCRIPTION - ORIENTATION: ORIENTATION: RIGHT

## 2023-04-13 ASSESSMENT — PAIN SCALES - GENERAL: PAINLEVEL_OUTOF10: 7

## 2023-04-13 ASSESSMENT — PAIN DESCRIPTION - DESCRIPTORS: DESCRIPTORS: ACHING

## 2023-04-13 ASSESSMENT — PAIN DESCRIPTION - LOCATION: LOCATION: TOE (COMMENT WHICH ONE)

## 2023-04-13 ASSESSMENT — PAIN - FUNCTIONAL ASSESSMENT: PAIN_FUNCTIONAL_ASSESSMENT: 0-10

## 2023-04-13 NOTE — ED PROVIDER NOTES
HPI:  4/13/23,   Time: 11:26 AM EDT         Deena Brown is a 52 y.o. male presenting to the ED for 3-day history of intense pain in the right great toe, worse with any pressure applied,, associated with swelling and redness and. He denied any injury or trauma. He denied history of gout. He has hypertension but no diabetes. ROS:   Pertinent positives and negatives are stated within HPI, all other systems reviewed and are negative.  --------------------------------------------- PAST HISTORY ---------------------------------------------  Past Medical History:  has a past medical history of AICD (automatic cardioverter/defibrillator) present, Asthma, CAD (coronary artery disease), Cardiac LV ejection fraction 10-20%, CHF (congestive heart failure) ([facility] [address] CKD (chronic kidney disease), COPD (chronic obstructive pulmonary disease) ([facility] [address] COVID-19, Depression, Diverticulitis, GERD (gastroesophageal reflux disease), Hyperlipidemia, Hypertension, Nonischemic cardiomyopathy ([facility] [address] Panic attacks, and Sleep apnea. Past Surgical History:  has a past surgical history that includes Diagnostic Cardiac Cath Lab Procedure (2015); Cardiac defibrillator placement (Left, 01/27/2017); and pr colonoscopy flx dx w/collj spec when pfrmd (N/A, 9/6/2018). Social History:  reports that he has never smoked. He has never used smokeless tobacco. He reports current alcohol use. He reports current drug use. Drug: Marijuana Delcie Zak). Family History: family history includes Cancer in his father; No Known Problems in his mother; Other in his sister. The patients home medications have been reviewed. Allergies: Patient has no known allergies. -------------------------------------------------- RESULTS -------------------------------------------------  All laboratory and radiology results have been personally reviewed by myself   LABS:  No results found for this visit on 04/13/23.     RADIOLOGY:  Interpreted by

## 2023-04-18 ENCOUNTER — OFFICE VISIT (OUTPATIENT)
Dept: CARDIOLOGY CLINIC | Age: 48
End: 2023-04-18
Payer: MEDICAID

## 2023-04-18 VITALS
WEIGHT: 218 LBS | SYSTOLIC BLOOD PRESSURE: 118 MMHG | RESPIRATION RATE: 16 BRPM | HEIGHT: 68 IN | HEART RATE: 64 BPM | BODY MASS INDEX: 33.04 KG/M2 | DIASTOLIC BLOOD PRESSURE: 82 MMHG

## 2023-04-18 DIAGNOSIS — E78.00 PURE HYPERCHOLESTEROLEMIA: ICD-10-CM

## 2023-04-18 DIAGNOSIS — I50.22 CHRONIC SYSTOLIC HEART FAILURE (HCC): ICD-10-CM

## 2023-04-18 DIAGNOSIS — G47.33 OBSTRUCTIVE SLEEP APNEA: ICD-10-CM

## 2023-04-18 DIAGNOSIS — N18.31 STAGE 3A CHRONIC KIDNEY DISEASE (HCC): ICD-10-CM

## 2023-04-18 DIAGNOSIS — I10 ESSENTIAL HYPERTENSION: ICD-10-CM

## 2023-04-18 DIAGNOSIS — E66.8 MODERATE OBESITY: ICD-10-CM

## 2023-04-18 DIAGNOSIS — I25.10 CORONARY ARTERY DISEASE INVOLVING NATIVE CORONARY ARTERY OF NATIVE HEART WITHOUT ANGINA PECTORIS: ICD-10-CM

## 2023-04-18 DIAGNOSIS — I42.8 NONISCHEMIC CARDIOMYOPATHY (HCC): Primary | ICD-10-CM

## 2023-04-18 PROCEDURE — 3074F SYST BP LT 130 MM HG: CPT | Performed by: INTERNAL MEDICINE

## 2023-04-18 PROCEDURE — 1036F TOBACCO NON-USER: CPT | Performed by: INTERNAL MEDICINE

## 2023-04-18 PROCEDURE — 3079F DIAST BP 80-89 MM HG: CPT | Performed by: INTERNAL MEDICINE

## 2023-04-18 PROCEDURE — 99215 OFFICE O/P EST HI 40 MIN: CPT | Performed by: INTERNAL MEDICINE

## 2023-04-18 PROCEDURE — G8427 DOCREV CUR MEDS BY ELIG CLIN: HCPCS | Performed by: INTERNAL MEDICINE

## 2023-04-18 PROCEDURE — 93000 ELECTROCARDIOGRAM COMPLETE: CPT | Performed by: INTERNAL MEDICINE

## 2023-04-18 PROCEDURE — G8417 CALC BMI ABV UP PARAM F/U: HCPCS | Performed by: INTERNAL MEDICINE

## 2023-04-18 RX ORDER — PREDNISONE 20 MG/1
20 TABLET ORAL DAILY
COMMUNITY

## 2023-04-18 NOTE — PROGRESS NOTES
OUTPATIENT CARDIOLOGY FOLLOW-UP    Name: Roldan Finnegan    Age: 52 y.o. Primary Care Physician: Elmer Finley. Dalton DO Nyla    Date of Service: 4/18/2023    Chief Complaint: Nonischemic cardiomyopathy, chronic systolic heart failure, coronary atherosclerosis, hypertension, chronic kidney disease, moderate obesity, obstructive sleep apnea    Interim History: Since his most recent evaluation, the patient appears to remain compensated from a cardiovascular standpoint with no interim symptoms of anginal-like chest discomfort or other ischemic equivalents, decompensated left ventricular systolic dysfunction or volume overload nor arrhythmia related manifestations with no discharges of his implantable cardioverter defibrillator. At most recent interrogation within the past month, no ventricular pacing was noted with suggestive evidence of an episode of either sinus tachycardia or paroxysmal supraventricular tachycardia with an difficulty determining in the absence of an atrial lead. He additionally relates recent incarceration and the lack of medical therapy during this timeframe. He additionally has remained noncompliant with the use of his nocturnal CPAP and is scheduled for a repeat sleep assessment to facilitate reinitiation within the next month. While remaining normotensive at the time of assessment, he has gained approximately 15 pounds. Within the past week, he has developed gouty arthritis with the initiation of steroids. Review of Systems: The remainder of a complete multisystem review including consitutional, central nervous, respiratory, circulatory, gastrointestinal, genitourinary, endocrinologic, hematologic, musculoskeletal and psychiatric are negative.     Past Medical History:  Past Medical History:   Diagnosis Date    AICD (automatic cardioverter/defibrillator) present     Asthma     CAD (coronary artery disease)     Cardiac LV ejection fraction 10-20%     CHF (congestive heart failure) ([facility] [address]

## 2023-04-20 RX ORDER — ATORVASTATIN CALCIUM 20 MG/1
TABLET, FILM COATED ORAL
Qty: 90 TABLET | Refills: 0 | Status: SHIPPED | OUTPATIENT
Start: 2023-04-20

## 2023-05-01 RX ORDER — IVABRADINE 5 MG/1
TABLET, FILM COATED ORAL
Qty: 180 TABLET | Refills: 0 | Status: SHIPPED | OUTPATIENT
Start: 2023-05-01

## 2023-05-30 RX ORDER — HYDRALAZINE HYDROCHLORIDE 25 MG/1
TABLET, FILM COATED ORAL
Qty: 270 TABLET | Refills: 1 | OUTPATIENT
Start: 2023-05-30

## 2023-06-05 RX ORDER — HYDRALAZINE HYDROCHLORIDE 25 MG/1
TABLET, FILM COATED ORAL
Qty: 270 TABLET | Refills: 1 | Status: SHIPPED | OUTPATIENT
Start: 2023-06-05

## 2023-06-05 RX ORDER — HYDRALAZINE HYDROCHLORIDE 25 MG/1
TABLET, FILM COATED ORAL
Qty: 270 TABLET | Refills: 1 | OUTPATIENT
Start: 2023-06-05

## 2023-07-11 RX ORDER — ASPIRIN 81 MG/1
81 TABLET, CHEWABLE ORAL DAILY
Qty: 90 TABLET | Refills: 3 | Status: SHIPPED | OUTPATIENT
Start: 2023-07-11

## 2023-10-02 ENCOUNTER — APPOINTMENT (OUTPATIENT)
Dept: GENERAL RADIOLOGY | Age: 48
End: 2023-10-02
Payer: MEDICAID

## 2023-10-02 ENCOUNTER — HOSPITAL ENCOUNTER (EMERGENCY)
Age: 48
Discharge: HOME | End: 2023-10-02
Attending: STUDENT IN AN ORGANIZED HEALTH CARE EDUCATION/TRAINING PROGRAM
Payer: MEDICAID

## 2023-10-02 VITALS
DIASTOLIC BLOOD PRESSURE: 93 MMHG | TEMPERATURE: 98.7 F | RESPIRATION RATE: 18 BRPM | HEART RATE: 65 BPM | SYSTOLIC BLOOD PRESSURE: 134 MMHG | OXYGEN SATURATION: 95 % | WEIGHT: 214 LBS | HEIGHT: 68 IN | BODY MASS INDEX: 32.43 KG/M2

## 2023-10-02 DIAGNOSIS — U07.1 COVID: ICD-10-CM

## 2023-10-02 DIAGNOSIS — R55 NEAR SYNCOPE: Primary | ICD-10-CM

## 2023-10-02 LAB
ALBUMIN SERPL-MCNC: 3.8 G/DL (ref 3.5–5.2)
ALP SERPL-CCNC: 107 U/L (ref 40–129)
ALT SERPL-CCNC: 17 U/L (ref 0–40)
ANION GAP SERPL CALCULATED.3IONS-SCNC: 11 MMOL/L (ref 7–16)
AST SERPL-CCNC: 18 U/L (ref 0–39)
BASOPHILS # BLD: 0.05 K/UL (ref 0–0.2)
BASOPHILS NFR BLD: 1 % (ref 0–2)
BILIRUB SERPL-MCNC: 0.4 MG/DL (ref 0–1.2)
BNP SERPL-MCNC: 217 PG/ML (ref 0–450)
BUN SERPL-MCNC: 13 MG/DL (ref 6–20)
CALCIUM SERPL-MCNC: 9.3 MG/DL (ref 8.6–10.2)
CHLORIDE SERPL-SCNC: 99 MMOL/L (ref 98–107)
CO2 SERPL-SCNC: 28 MMOL/L (ref 22–29)
CREAT SERPL-MCNC: 2 MG/DL (ref 0.7–1.2)
EKG ATRIAL RATE: 64 BPM
EKG P AXIS: 17 DEGREES
EKG P-R INTERVAL: 158 MS
EKG Q-T INTERVAL: 392 MS
EKG QRS DURATION: 92 MS
EKG QTC CALCULATION (BAZETT): 469 MS
EKG R AXIS: -53 DEGREES
EKG T AXIS: 45 DEGREES
EKG VENTRICULAR RATE: 86 BPM
EOSINOPHIL # BLD: 0.03 K/UL (ref 0.05–0.5)
EOSINOPHILS RELATIVE PERCENT: 1 % (ref 0–6)
ERYTHROCYTE [DISTWIDTH] IN BLOOD BY AUTOMATED COUNT: 13.7 % (ref 11.5–15)
GFR SERPL CREATININE-BSD FRML MDRD: 41 ML/MIN/1.73M2
GLUCOSE SERPL-MCNC: 111 MG/DL (ref 74–99)
HCT VFR BLD AUTO: 49.1 % (ref 37–54)
HGB BLD-MCNC: 15.7 G/DL (ref 12.5–16.5)
IMM GRANULOCYTES # BLD AUTO: <0.03 K/UL (ref 0–0.58)
IMM GRANULOCYTES NFR BLD: 0 % (ref 0–5)
INFLUENZA A BY PCR: NOT DETECTED
INFLUENZA B BY PCR: NOT DETECTED
LYMPHOCYTES NFR BLD: 1.68 K/UL (ref 1.5–4)
LYMPHOCYTES RELATIVE PERCENT: 26 % (ref 20–42)
MAGNESIUM SERPL-MCNC: 2 MG/DL (ref 1.6–2.6)
MCH RBC QN AUTO: 27.9 PG (ref 26–35)
MCHC RBC AUTO-ENTMCNC: 32 G/DL (ref 32–34.5)
MCV RBC AUTO: 87.4 FL (ref 80–99.9)
MONOCYTES NFR BLD: 0.87 K/UL (ref 0.1–0.95)
MONOCYTES NFR BLD: 13 % (ref 2–12)
NEUTROPHILS NFR BLD: 60 % (ref 43–80)
NEUTS SEG NFR BLD: 3.9 K/UL (ref 1.8–7.3)
PLATELET # BLD AUTO: 278 K/UL (ref 130–450)
PMV BLD AUTO: 10.9 FL (ref 7–12)
POTASSIUM SERPL-SCNC: 3 MMOL/L (ref 3.5–5)
PROT SERPL-MCNC: 7.2 G/DL (ref 6.4–8.3)
RBC # BLD AUTO: 5.62 M/UL (ref 3.8–5.8)
SARS-COV-2 RDRP RESP QL NAA+PROBE: DETECTED
SODIUM SERPL-SCNC: 138 MMOL/L (ref 132–146)
SPECIMEN DESCRIPTION: ABNORMAL
TROPONIN I SERPL HS-MCNC: 30 NG/L (ref 0–11)
TROPONIN I SERPL HS-MCNC: 33 NG/L (ref 0–11)
TSH SERPL DL<=0.05 MIU/L-ACNC: 0.4 UIU/ML (ref 0.27–4.2)
WBC OTHER # BLD: 6.6 K/UL (ref 4.5–11.5)

## 2023-10-02 PROCEDURE — 84484 ASSAY OF TROPONIN QUANT: CPT

## 2023-10-02 PROCEDURE — 84443 ASSAY THYROID STIM HORMONE: CPT

## 2023-10-02 PROCEDURE — 2580000003 HC RX 258: Performed by: EMERGENCY MEDICINE

## 2023-10-02 PROCEDURE — 87635 SARS-COV-2 COVID-19 AMP PRB: CPT

## 2023-10-02 PROCEDURE — 6370000000 HC RX 637 (ALT 250 FOR IP): Performed by: EMERGENCY MEDICINE

## 2023-10-02 PROCEDURE — 87502 INFLUENZA DNA AMP PROBE: CPT

## 2023-10-02 PROCEDURE — 85025 COMPLETE CBC W/AUTO DIFF WBC: CPT

## 2023-10-02 PROCEDURE — 93005 ELECTROCARDIOGRAM TRACING: CPT | Performed by: EMERGENCY MEDICINE

## 2023-10-02 PROCEDURE — 71045 X-RAY EXAM CHEST 1 VIEW: CPT

## 2023-10-02 PROCEDURE — 83880 ASSAY OF NATRIURETIC PEPTIDE: CPT

## 2023-10-02 PROCEDURE — 93010 ELECTROCARDIOGRAM REPORT: CPT | Performed by: INTERNAL MEDICINE

## 2023-10-02 PROCEDURE — 99285 EMERGENCY DEPT VISIT HI MDM: CPT

## 2023-10-02 PROCEDURE — 83735 ASSAY OF MAGNESIUM: CPT

## 2023-10-02 PROCEDURE — 80053 COMPREHEN METABOLIC PANEL: CPT

## 2023-10-02 RX ORDER — 0.9 % SODIUM CHLORIDE 0.9 %
1000 INTRAVENOUS SOLUTION INTRAVENOUS ONCE
Status: COMPLETED | OUTPATIENT
Start: 2023-10-02 | End: 2023-10-02

## 2023-10-02 RX ORDER — 0.9 % SODIUM CHLORIDE 0.9 %
1000 INTRAVENOUS SOLUTION INTRAVENOUS ONCE
Status: DISCONTINUED | OUTPATIENT
Start: 2023-10-02 | End: 2023-10-02

## 2023-10-02 RX ADMIN — SODIUM CHLORIDE 1000 ML: 9 INJECTION, SOLUTION INTRAVENOUS at 14:07

## 2023-10-02 RX ADMIN — POTASSIUM BICARBONATE 50 MEQ: 978 TABLET, EFFERVESCENT ORAL at 14:06

## 2023-10-02 NOTE — ED PROVIDER NOTES
Sensitivity 30 (*)     All other components within normal limits   INFLUENZA A + B, PCR   TSH   BRAIN NATRIURETIC PEPTIDE   MAGNESIUM       As interpreted by me, the above displayed labs are abnormal. All other labs obtained during this visit were within normal range or not returned as of this dictation. RADIOLOGY:   Non-plain film images such as CT, Ultrasound and MRI are read by the radiologist. Plain radiographic images are visualized and preliminarily interpreted by the ED Provider with the below findings:    Interpretation per the Radiologist below, if available at the time of this note:    XR CHEST PORTABLE   Final Result   No acute process. Pacemaker in place. No results found. No results found. Is this patient to be included in the SEP-1 core measure due to severe sepsis or septic shock? No Exclusion criteria - the patient is NOT to be included for SEP-1 Core Measure due to: Infection is not suspected    PROCEDURES   Unless otherwise noted below, none    PAST MEDICAL HISTORY/Chronic Conditions Affecting Care      has a past medical history of AICD (automatic cardioverter/defibrillator) present, Asthma, CAD (coronary artery disease), Cardiac LV ejection fraction 10-20%, CHF (congestive heart failure) ([address]), CKD (chronic kidney disease), COPD (chronic obstructive pulmonary disease) ([address]), COVID-19 (10/06/2021), Depression, Diverticulitis, GERD (gastroesophageal reflux disease), Hyperlipidemia, Hypertension, Nonischemic cardiomyopathy ([address]), Panic attacks, and Sleep apnea.      EMERGENCY DEPARTMENT COURSE    Vitals:    Vitals:    10/02/23 1346 10/02/23 1642 10/02/23 1915 10/02/23 1930   BP: 98/70 111/79 (!) 138/91 (!) 134/93   Pulse: 58 58 60 65   Resp: 29 28 19 18   Temp:       TempSrc:       SpO2: 92% 95%     Weight:       Height:           Patient was given the following medications:  Medications   sodium chloride 0.9 % bolus 1,000 mL (0 mLs IntraVENous Stopped 10/2/23 2113)

## 2023-10-02 NOTE — ED NOTES
Received report from [facility]; assumed care of pt at this time; droplet plus isolation for covid 19 continued; vitals stable; attempted to interrogate pacemaker using [employer] interrogator; did not connect to ; charge RN notified and called company; needs met; call bell in reach; will monitor      Angelita Otero, SHAYY  10/02/23 1940

## 2023-10-02 NOTE — ED NOTES
Searched through chart found patient EP note that states something about [employer] pacer; used [employer] interrogator and it transmitted at this time; awaiting results; physician updated; will monitor     Rahat Lloyd RN  10/02/23 1941

## 2023-10-12 ENCOUNTER — TELEPHONE (OUTPATIENT)
Dept: NON INVASIVE DIAGNOSTICS | Age: 48
End: 2023-10-12

## 2023-10-12 NOTE — TELEPHONE ENCOUNTER
Left message for Swapna House at [facility] regarding scheduling of patient (last seen in 2021).

## 2023-12-06 ENCOUNTER — HOSPITAL ENCOUNTER (OUTPATIENT)
Age: 48
Discharge: HOME | End: 2023-12-06
Payer: MEDICAID

## 2023-12-06 LAB
ALBUMIN SERPL-MCNC: 4 G/DL (ref 3.5–5.2)
ALP SERPL-CCNC: 110 U/L (ref 40–129)
ALT SERPL-CCNC: 28 U/L (ref 0–40)
ANION GAP SERPL CALCULATED.3IONS-SCNC: 7 MMOL/L (ref 7–16)
AST SERPL-CCNC: 17 U/L (ref 0–39)
BASOPHILS # BLD: 0.04 K/UL (ref 0–0.2)
BASOPHILS NFR BLD: 1 % (ref 0–2)
BILIRUB SERPL-MCNC: 0.8 MG/DL (ref 0–1.2)
BUN SERPL-MCNC: 13 MG/DL (ref 6–20)
CALCIUM SERPL-MCNC: 9.7 MG/DL (ref 8.6–10.2)
CHLORIDE SERPL-SCNC: 107 MMOL/L (ref 98–107)
CHOLEST SERPL-MCNC: 93 MG/DL
CO2 SERPL-SCNC: 29 MMOL/L (ref 22–29)
CREAT SERPL-MCNC: 2.4 MG/DL (ref 0.7–1.2)
EOSINOPHIL # BLD: 0.39 K/UL (ref 0.05–0.5)
EOSINOPHILS RELATIVE PERCENT: 7 % (ref 0–6)
ERYTHROCYTE [DISTWIDTH] IN BLOOD BY AUTOMATED COUNT: 13.7 % (ref 11.5–15)
GFR SERPL CREATININE-BSD FRML MDRD: 33 ML/MIN/1.73M2
GLUCOSE SERPL-MCNC: 97 MG/DL (ref 74–99)
HCT VFR BLD AUTO: 45 % (ref 37–54)
HDLC SERPL-MCNC: 28 MG/DL
HGB BLD-MCNC: 14.2 G/DL (ref 12.5–16.5)
IMM GRANULOCYTES # BLD AUTO: <0.03 K/UL (ref 0–0.58)
IMM GRANULOCYTES NFR BLD: 0 % (ref 0–5)
LDLC SERPL CALC-MCNC: 42 MG/DL
LYMPHOCYTES NFR BLD: 2.64 K/UL (ref 1.5–4)
LYMPHOCYTES RELATIVE PERCENT: 49 % (ref 20–42)
MCH RBC QN AUTO: 27.4 PG (ref 26–35)
MCHC RBC AUTO-ENTMCNC: 31.6 G/DL (ref 32–34.5)
MCV RBC AUTO: 86.9 FL (ref 80–99.9)
MONOCYTES NFR BLD: 0.37 K/UL (ref 0.1–0.95)
MONOCYTES NFR BLD: 7 % (ref 2–12)
NEUTROPHILS NFR BLD: 36 % (ref 43–80)
NEUTS SEG NFR BLD: 1.92 K/UL (ref 1.8–7.3)
PLATELET # BLD AUTO: 246 K/UL (ref 130–450)
PMV BLD AUTO: 10.9 FL (ref 7–12)
POTASSIUM SERPL-SCNC: 4.3 MMOL/L (ref 3.5–5)
PROT SERPL-MCNC: 6.3 G/DL (ref 6.4–8.3)
RBC # BLD AUTO: 5.18 M/UL (ref 3.8–5.8)
SODIUM SERPL-SCNC: 143 MMOL/L (ref 132–146)
T4 SERPL-MCNC: 6.7 UG/DL (ref 4.5–11.7)
TRIGL SERPL-MCNC: 113 MG/DL
TSH SERPL DL<=0.05 MIU/L-ACNC: 0.57 UIU/ML (ref 0.27–4.2)
VLDLC SERPL CALC-MCNC: 23 MG/DL
WBC OTHER # BLD: 5.4 K/UL (ref 4.5–11.5)

## 2023-12-06 PROCEDURE — 36415 COLL VENOUS BLD VENIPUNCTURE: CPT

## 2023-12-06 PROCEDURE — 84436 ASSAY OF TOTAL THYROXINE: CPT

## 2023-12-06 PROCEDURE — 80053 COMPREHEN METABOLIC PANEL: CPT

## 2023-12-06 PROCEDURE — 85025 COMPLETE CBC W/AUTO DIFF WBC: CPT

## 2023-12-06 PROCEDURE — 84443 ASSAY THYROID STIM HORMONE: CPT

## 2023-12-06 PROCEDURE — 80061 LIPID PANEL: CPT

## 2024-01-30 ENCOUNTER — HOSPITAL ENCOUNTER (OUTPATIENT)
Age: 49
Discharge: HOME | End: 2024-01-30
Payer: MEDICAID

## 2024-01-30 LAB
25(OH)D3 SERPL-MCNC: 30.8 NG/ML (ref 30–100)
ALBUMIN SERPL-MCNC: 4.3 G/DL (ref 3.5–5.2)
ALP SERPL-CCNC: 137 U/L (ref 40–129)
ALT SERPL-CCNC: 99 U/L (ref 0–40)
ANION GAP SERPL CALCULATED.3IONS-SCNC: 8 MMOL/L (ref 7–16)
AST SERPL-CCNC: 42 U/L (ref 0–39)
BACTERIA URNS QL MICRO: ABNORMAL
BILIRUB SERPL-MCNC: 0.6 MG/DL (ref 0–1.2)
BUN SERPL-MCNC: 13 MG/DL (ref 6–20)
CALCIUM SERPL-MCNC: 9.8 MG/DL (ref 8.6–10.2)
CHLORIDE SERPL-SCNC: 107 MMOL/L (ref 98–107)
CO2 SERPL-SCNC: 32 MMOL/L (ref 22–29)
CREAT SERPL-MCNC: 1.9 MG/DL (ref 0.7–1.2)
CREAT UR-MCNC: 371.8 MG/DL (ref 40–278)
CREAT UR-MCNC: 375.2 MG/DL (ref 40–278)
ERYTHROCYTE [DISTWIDTH] IN BLOOD BY AUTOMATED COUNT: 14.1 % (ref 11.5–15)
GFR SERPL CREATININE-BSD FRML MDRD: 42 ML/MIN/1.73M2
GLUCOSE SERPL-MCNC: 105 MG/DL (ref 74–99)
HCT VFR BLD AUTO: 46.6 % (ref 37–54)
HGB BLD-MCNC: 14.7 G/DL (ref 12.5–16.5)
MCH RBC QN AUTO: 27.5 PG (ref 26–35)
MCHC RBC AUTO-ENTMCNC: 31.5 G/DL (ref 32–34.5)
MCV RBC AUTO: 87.1 FL (ref 80–99.9)
MICROALBUMIN UR-MCNC: 254 MG/L (ref 0–19)
MICROALBUMIN/CREAT UR-RTO: 68 MCG/MG CREAT (ref 0–30)
PHOSPHATE SERPL-MCNC: 3.6 MG/DL (ref 2.5–4.5)
PLATELET # BLD AUTO: 273 K/UL (ref 130–450)
PMV BLD AUTO: 10.8 FL (ref 7–12)
POTASSIUM SERPL-SCNC: 4.2 MMOL/L (ref 3.5–5)
PROT SERPL-MCNC: 7.1 G/DL (ref 6.4–8.3)
PTH-INTACT SERPL-MCNC: 77.1 PG/ML (ref 15–65)
RBC # BLD AUTO: 5.35 M/UL (ref 3.8–5.8)
RBC #/AREA URNS HPF: ABNORMAL /HPF
SODIUM SERPL-SCNC: 147 MMOL/L (ref 132–146)
TOTAL PROTEIN, URINE: 76 MG/DL (ref 0–12)
URINE TOTAL PROTEIN CREATININE RATIO: 0.2 (ref 0–0.2)
WBC #/AREA URNS HPF: ABNORMAL /HPF
WBC OTHER # BLD: 5.8 K/UL (ref 4.5–11.5)

## 2024-01-30 PROCEDURE — 81015 MICROSCOPIC EXAM OF URINE: CPT

## 2024-01-30 PROCEDURE — 82306 VITAMIN D 25 HYDROXY: CPT

## 2024-01-30 PROCEDURE — 85027 COMPLETE CBC AUTOMATED: CPT

## 2024-01-30 PROCEDURE — 82570 ASSAY OF URINE CREATININE: CPT

## 2024-01-30 PROCEDURE — 36415 COLL VENOUS BLD VENIPUNCTURE: CPT

## 2024-01-30 PROCEDURE — 83970 ASSAY OF PARATHORMONE: CPT

## 2024-01-30 PROCEDURE — 84156 ASSAY OF PROTEIN URINE: CPT

## 2024-01-30 PROCEDURE — 82043 UR ALBUMIN QUANTITATIVE: CPT

## 2024-01-30 PROCEDURE — 80053 COMPREHEN METABOLIC PANEL: CPT

## 2024-01-30 PROCEDURE — 84100 ASSAY OF PHOSPHORUS: CPT

## 2024-01-31 NOTE — PROGRESS NOTES
[facility]  Outpatient Progress Note  Jules Larson  1975  Date of Service: 2/1/2024  PCP: Adam Robles MD  Electrophysiologist: Karsten Monroe MD        Subjective: Jules Larson is seen for follow-up and management of ICD management. Mr. Larson has a history of CKD Stage 3, HTN, obesity, chronic systolic CHF, NYHA II Stage C, non-ischemic etiology, asthmas and [employer] single chamber ICD in situ. The patient had a ICD placed in January 2017 for primary prevention. He last saw Dr. Kelly in February of 2021 and have not bee seen since. He was seen in ED in October 2023 due to dizziness and syncope. ICD interrogation at showed no events. He was tested positive for COVID-19 infection. Since ED visit the patient reports feeling overall well. His device site looks well healed and free from infection or erosion. He offers no complaints from a device POV. The patient presents today in SB. Currently denies any angina, syncope, dyspnea on exertion, paroxysmal nocturnal dyspnea and palpitations.     Patient Active Problem List   Diagnosis    Asthma    CKD (chronic kidney disease) stage 3, GFR 30-59 ml/min (HCC)    Nonischemic cardiomyopathy (HCC)    Chronic systolic heart failure (HCC)    Moderate obesity    Essential hypertension    Implantable cardioverter-defibrillator (ICD) in situ    Coronary artery disease involving native coronary artery of native heart without angina pectoris    Pure hypercholesterolemia    Chest pain    Obstructive sleep apnea    Chronic heart failure with preserved ejection fraction (HCC)    Nonrheumatic mitral valve regurgitation    JOS (acute kidney injury) (HCC)    COVID-19 virus infection       Current Outpatient Medications   Medication Sig Dispense Refill    aspirin (ASPIRIN LOW DOSE) 81 MG chewable tablet Take 1 tablet by mouth daily 90 tablet 3    hydrALAZINE (APRESOLINE) 25 MG

## 2024-02-01 ENCOUNTER — OFFICE VISIT (OUTPATIENT)
Dept: NON INVASIVE DIAGNOSTICS | Age: 49
End: 2024-02-01

## 2024-02-01 VITALS
RESPIRATION RATE: 16 BRPM | DIASTOLIC BLOOD PRESSURE: 76 MMHG | BODY MASS INDEX: 27.83 KG/M2 | WEIGHT: 183.6 LBS | HEIGHT: 68 IN | SYSTOLIC BLOOD PRESSURE: 126 MMHG | HEART RATE: 54 BPM

## 2024-02-01 DIAGNOSIS — R94.31 EKG ABNORMALITIES: ICD-10-CM

## 2024-02-01 DIAGNOSIS — I49.9 IRREGULAR HEART BEAT: Primary | ICD-10-CM

## 2024-02-01 DIAGNOSIS — Z95.810 ICD (IMPLANTABLE CARDIOVERTER-DEFIBRILLATOR), SINGLE, IN SITU: ICD-10-CM

## 2024-03-17 PROCEDURE — 93296 REM INTERROG EVL PM/IDS: CPT | Performed by: STUDENT IN AN ORGANIZED HEALTH CARE EDUCATION/TRAINING PROGRAM

## 2024-03-17 PROCEDURE — 93295 DEV INTERROG REMOTE 1/2/MLT: CPT | Performed by: STUDENT IN AN ORGANIZED HEALTH CARE EDUCATION/TRAINING PROGRAM

## 2024-06-25 ENCOUNTER — TELEPHONE (OUTPATIENT)
Dept: CARDIOLOGY | Age: 49
End: 2024-06-25

## 2024-06-25 NOTE — TELEPHONE ENCOUNTER
CALLED PATIENT AND LEFT MESSAGE TO SCHEDULE ECHO.    Electronically signed by Kimberly Kc on 6/25/2024 at 10:32 AM

## 2024-06-30 PROCEDURE — 93295 DEV INTERROG REMOTE 1/2/MLT: CPT | Performed by: STUDENT IN AN ORGANIZED HEALTH CARE EDUCATION/TRAINING PROGRAM

## 2024-06-30 PROCEDURE — 93296 REM INTERROG EVL PM/IDS: CPT | Performed by: STUDENT IN AN ORGANIZED HEALTH CARE EDUCATION/TRAINING PROGRAM

## 2024-09-05 ENCOUNTER — TELEPHONE (OUTPATIENT)
Dept: CARDIOLOGY CLINIC | Age: 49
End: 2024-09-05

## 2024-09-05 NOTE — TELEPHONE ENCOUNTER
Pt phnd states he is going to have hip surgery (no date set yet) by Dr Francisco Manning (if Dr King is ok with signing off, please fax cardio records to:  [phone] attn Dr Manning or Gisele).  Pt has a pacemaker and needs cardio clearance prior to surgery.  Please call pt to schedule [phone]

## 2024-10-02 PROCEDURE — 93295 DEV INTERROG REMOTE 1/2/MLT: CPT | Performed by: INTERNAL MEDICINE

## 2024-10-02 PROCEDURE — 93296 REM INTERROG EVL PM/IDS: CPT | Performed by: INTERNAL MEDICINE

## 2024-11-07 ENCOUNTER — OFFICE VISIT (OUTPATIENT)
Dept: CARDIOLOGY CLINIC | Age: 49
End: 2024-11-07
Payer: MEDICAID

## 2024-11-07 VITALS
HEART RATE: 45 BPM | RESPIRATION RATE: 16 BRPM | WEIGHT: 200 LBS | SYSTOLIC BLOOD PRESSURE: 122 MMHG | HEIGHT: 68 IN | DIASTOLIC BLOOD PRESSURE: 72 MMHG | BODY MASS INDEX: 30.31 KG/M2

## 2024-11-07 DIAGNOSIS — I50.22 CHRONIC SYSTOLIC HEART FAILURE (HCC): ICD-10-CM

## 2024-11-07 DIAGNOSIS — I25.10 CORONARY ARTERY DISEASE INVOLVING NATIVE CORONARY ARTERY OF NATIVE HEART WITHOUT ANGINA PECTORIS: ICD-10-CM

## 2024-11-07 DIAGNOSIS — Z01.810 PREOPERATIVE CARDIOVASCULAR EXAMINATION: ICD-10-CM

## 2024-11-07 DIAGNOSIS — I10 ESSENTIAL HYPERTENSION: ICD-10-CM

## 2024-11-07 DIAGNOSIS — N18.32 STAGE 3B CHRONIC KIDNEY DISEASE (HCC): ICD-10-CM

## 2024-11-07 DIAGNOSIS — I42.8 NONISCHEMIC CARDIOMYOPATHY (HCC): Primary | ICD-10-CM

## 2024-11-07 DIAGNOSIS — E78.00 PURE HYPERCHOLESTEROLEMIA: ICD-10-CM

## 2024-11-07 DIAGNOSIS — E66.9 MODERATE OBESITY: ICD-10-CM

## 2024-11-07 DIAGNOSIS — G47.33 OBSTRUCTIVE SLEEP APNEA: ICD-10-CM

## 2024-11-07 PROCEDURE — G8417 CALC BMI ABV UP PARAM F/U: HCPCS | Performed by: INTERNAL MEDICINE

## 2024-11-07 PROCEDURE — G8427 DOCREV CUR MEDS BY ELIG CLIN: HCPCS | Performed by: INTERNAL MEDICINE

## 2024-11-07 PROCEDURE — 3074F SYST BP LT 130 MM HG: CPT | Performed by: INTERNAL MEDICINE

## 2024-11-07 PROCEDURE — 1036F TOBACCO NON-USER: CPT | Performed by: INTERNAL MEDICINE

## 2024-11-07 PROCEDURE — 93000 ELECTROCARDIOGRAM COMPLETE: CPT | Performed by: INTERNAL MEDICINE

## 2024-11-07 PROCEDURE — 3078F DIAST BP <80 MM HG: CPT | Performed by: INTERNAL MEDICINE

## 2024-11-07 PROCEDURE — G8484 FLU IMMUNIZE NO ADMIN: HCPCS | Performed by: INTERNAL MEDICINE

## 2024-11-07 PROCEDURE — 99215 OFFICE O/P EST HI 40 MIN: CPT | Performed by: INTERNAL MEDICINE

## 2024-11-07 RX ORDER — OMEPRAZOLE 40 MG/1
CAPSULE, DELAYED RELEASE ORAL
COMMUNITY
Start: 2024-10-21

## 2024-11-07 NOTE — PROGRESS NOTES
\"BILITOT\", \"BILIDIR\", \"LABALBU\" in the last 72 hours.    Invalid input(s): \"PROT\"  Lab Results   Component Value Date/Time    MG 2.0 10/02/2023 12:40 PM     Lab Results   Component Value Date/Time    PROTIME 12.2 01/28/2017 05:05 AM    INR 1.1 01/28/2017 05:05 AM     Lab Results   Component Value Date/Time    TSH 0.57 12/06/2023 08:45 AM     No components found for: \"CHLPL\"  Lab Results   Component Value Date    TRIG 257 (H) 08/25/2021    TRIG 258 (H) 08/25/2021    TRIG 132 10/03/2017     Lab Results   Component Value Date    HDL 28 (L) 12/06/2023    HDL 29 08/25/2021    HDL 28 08/25/2021     No components found for: \"LDLCALC\"    Cardiac Tests:  ECG: A resting electrocardiogram demonstrates evidence of sinus bradycardia with a rightward directed axis and nonspecific ST changes      ASSESSMENT / PLAN: On a clinical basis, the patient presently appears compensated from a cardiovascular standpoint and at present I have maintained his existing medical regimen with the exception of discontinuing his ivabradine on the basis of his present bradycardia.  I feel he would be an acceptable candidate for his proposed noncardiac surgical procedure with a low risk of perioperative ischemic events and needs of cautious periprocedural fluid administration to reduce risk of iatrogenic volume overload and/or perioperative congestive heart failure.  I would recommend the administration of his beta-blocker on the morning of his surgical procedure to further reduce risk of perioperative ischemic events as well as out of blood pressure regulation and would feel that his antiplatelet therapy could be discontinued 5 days preoperatively to reduce risk of perioperative bleeding with resumption as appropriate postoperatively.  I have encouraged ongoing appropriate lifestyle modification to achieve weight reduction to benefit diastolic cardiac components of heart failure management in addition to that of management of his obstructive sleep

## 2024-11-14 ENCOUNTER — TELEPHONE (OUTPATIENT)
Dept: CARDIOLOGY | Age: 49
End: 2024-11-14

## 2024-11-29 ENCOUNTER — TELEPHONE (OUTPATIENT)
Dept: CARDIOLOGY | Age: 49
End: 2024-11-29

## 2024-11-29 NOTE — TELEPHONE ENCOUNTER
CALLED PATIENT AND LM TO RESCHEDULE ECHO CANCELED FROM 7/30    Electronically signed by Paige Vance on 11/29/2024 at 9:30 AM

## 2024-12-06 ENCOUNTER — TELEPHONE (OUTPATIENT)
Dept: CARDIOLOGY | Age: 49
End: 2024-12-06

## 2024-12-06 NOTE — TELEPHONE ENCOUNTER
Called patient twice to attempt to schedule their echo. No return call to schedule. Patient will be removed from workqueue list.   Thank you.       Electronically signed by Elisabeth Villatoro on 12/6/2024 at 11:58 AM

## 2025-02-14 ENCOUNTER — TELEPHONE (OUTPATIENT)
Dept: CARDIOLOGY CLINIC | Age: 50
End: 2025-02-14

## 2025-02-14 NOTE — TELEPHONE ENCOUNTER
Patient of Dr. King called states he was instructed to call the office by Gisele Srivastava's office   for clearance from a cardiac standpoint to begin taking  [place]              Please advise  Dr. King is on G[place]

## 2025-04-16 ENCOUNTER — TELEPHONE (OUTPATIENT)
Dept: ADMINISTRATIVE | Age: 50
End: 2025-04-16

## 2025-06-03 ENCOUNTER — OFFICE VISIT (OUTPATIENT)
Dept: CARDIOLOGY CLINIC | Age: 50
End: 2025-06-03
Payer: MEDICAID

## 2025-06-03 VITALS
WEIGHT: 212 LBS | BODY MASS INDEX: 32.13 KG/M2 | HEART RATE: 90 BPM | RESPIRATION RATE: 16 BRPM | DIASTOLIC BLOOD PRESSURE: 70 MMHG | HEIGHT: 68 IN | SYSTOLIC BLOOD PRESSURE: 108 MMHG

## 2025-06-03 DIAGNOSIS — G47.33 OSA (OBSTRUCTIVE SLEEP APNEA): ICD-10-CM

## 2025-06-03 DIAGNOSIS — Z86.79 HISTORY OF CARDIOMYOPATHY: Primary | ICD-10-CM

## 2025-06-03 DIAGNOSIS — I42.9 CARDIOMYOPATHY, UNSPECIFIED TYPE (HCC): ICD-10-CM

## 2025-06-03 DIAGNOSIS — Z01.810 PREOPERATIVE CARDIOVASCULAR EXAMINATION: ICD-10-CM

## 2025-06-03 DIAGNOSIS — I10 PRIMARY HYPERTENSION: ICD-10-CM

## 2025-06-03 DIAGNOSIS — M25.552 LEFT HIP PAIN: ICD-10-CM

## 2025-06-03 DIAGNOSIS — Z95.810 ICD (IMPLANTABLE CARDIOVERTER-DEFIBRILLATOR) IN PLACE: ICD-10-CM

## 2025-06-03 PROCEDURE — 3078F DIAST BP <80 MM HG: CPT | Performed by: INTERNAL MEDICINE

## 2025-06-03 PROCEDURE — 1036F TOBACCO NON-USER: CPT | Performed by: INTERNAL MEDICINE

## 2025-06-03 PROCEDURE — 3074F SYST BP LT 130 MM HG: CPT | Performed by: INTERNAL MEDICINE

## 2025-06-03 PROCEDURE — 99214 OFFICE O/P EST MOD 30 MIN: CPT | Performed by: INTERNAL MEDICINE

## 2025-06-03 PROCEDURE — G8417 CALC BMI ABV UP PARAM F/U: HCPCS | Performed by: INTERNAL MEDICINE

## 2025-06-03 PROCEDURE — 93000 ELECTROCARDIOGRAM COMPLETE: CPT | Performed by: INTERNAL MEDICINE

## 2025-06-03 PROCEDURE — G8427 DOCREV CUR MEDS BY ELIG CLIN: HCPCS | Performed by: INTERNAL MEDICINE

## 2025-06-03 PROCEDURE — G2211 COMPLEX E/M VISIT ADD ON: HCPCS | Performed by: INTERNAL MEDICINE

## 2025-06-03 NOTE — PROGRESS NOTES
questions answered about cardiac diagnoses and cardiac medications.   Continue current medications. Monitor BP and heart rates.              Compliance with medications and f/u with physicians discussed.   Risk factor modification based on risk profile discussed.   Advised to call for exertional chest pains, short of breath; dizzy or palpitations.   Follow up in 6-9 months or earlier if needed.         [facility]  [address] [place] [address]  [phone]

## 2025-06-11 ENCOUNTER — TELEPHONE (OUTPATIENT)
Dept: CARDIOLOGY CLINIC | Age: 50
End: 2025-06-11

## 2025-06-11 DIAGNOSIS — I10 PRIMARY HYPERTENSION: ICD-10-CM

## 2025-06-11 DIAGNOSIS — R06.02 SOB (SHORTNESS OF BREATH): ICD-10-CM

## 2025-06-11 DIAGNOSIS — Z95.810 ICD (IMPLANTABLE CARDIOVERTER-DEFIBRILLATOR) IN PLACE: ICD-10-CM

## 2025-06-11 DIAGNOSIS — I42.9 CARDIOMYOPATHY (HCC): Primary | ICD-10-CM

## 2025-06-11 NOTE — TELEPHONE ENCOUNTER
You recently saw patient for Cardiac Clearance for hip sx. You did clear him, however, Anesthesiology will not do surgery unless patient has a stress test. Please advise

## 2025-07-05 PROCEDURE — 93295 DEV INTERROG REMOTE 1/2/MLT: CPT | Performed by: INTERNAL MEDICINE

## 2025-07-05 PROCEDURE — 93296 REM INTERROG EVL PM/IDS: CPT | Performed by: INTERNAL MEDICINE

## 2025-07-07 ENCOUNTER — TELEPHONE (OUTPATIENT)
Dept: CARDIOLOGY | Age: 50
End: 2025-07-07

## 2025-07-17 ENCOUNTER — TELEPHONE (OUTPATIENT)
Dept: CARDIOLOGY CLINIC | Age: 50
End: 2025-07-17

## 2025-07-22 ENCOUNTER — TELEPHONE (OUTPATIENT)
Dept: CARDIOLOGY | Age: 50
End: 2025-07-22

## 2025-07-22 NOTE — TELEPHONE ENCOUNTER
Spoke with patient and confirmed Pharmacological  stress test appointment on 7/24/2025 at 0730. Instructions for test,given including NPO after MN, no caffeine 12 hours prior to the test ,NPO after MN bring inhaler to the test , and COVID-19 preprocedure information reviewed with patient, questions answered. Patient verbalized understanding.

## 2025-07-24 ENCOUNTER — HOSPITAL ENCOUNTER (OUTPATIENT)
Dept: CARDIOLOGY | Age: 50
Discharge: HOME | End: 2025-07-26
Attending: INTERNAL MEDICINE
Payer: MEDICAID

## 2025-07-24 VITALS
SYSTOLIC BLOOD PRESSURE: 118 MMHG | BODY MASS INDEX: 32.13 KG/M2 | DIASTOLIC BLOOD PRESSURE: 78 MMHG | HEIGHT: 68 IN | HEART RATE: 60 BPM | WEIGHT: 212 LBS | RESPIRATION RATE: 14 BRPM

## 2025-07-24 DIAGNOSIS — Z95.810 ICD (IMPLANTABLE CARDIOVERTER-DEFIBRILLATOR) IN PLACE: ICD-10-CM

## 2025-07-24 DIAGNOSIS — I42.9 CARDIOMYOPATHY (HCC): ICD-10-CM

## 2025-07-24 DIAGNOSIS — I10 PRIMARY HYPERTENSION: ICD-10-CM

## 2025-07-24 DIAGNOSIS — R06.02 SOB (SHORTNESS OF BREATH): ICD-10-CM

## 2025-07-24 PROCEDURE — 2500000003 HC RX 250 WO HCPCS: Performed by: INTERNAL MEDICINE

## 2025-07-24 PROCEDURE — 93017 CV STRESS TEST TRACING ONLY: CPT

## 2025-07-24 PROCEDURE — 6360000002 HC RX W HCPCS: Performed by: INTERNAL MEDICINE

## 2025-07-24 PROCEDURE — 3430000000 HC RX DIAGNOSTIC RADIOPHARMACEUTICAL: Performed by: INTERNAL MEDICINE

## 2025-07-24 PROCEDURE — A9502 TC99M TETROFOSMIN: HCPCS | Performed by: INTERNAL MEDICINE

## 2025-07-24 RX ORDER — SODIUM CHLORIDE 0.9 % (FLUSH) 0.9 %
10 SYRINGE (ML) INJECTION PRN
Status: DISCONTINUED | OUTPATIENT
Start: 2025-07-24 | End: 2025-07-27 | Stop reason: HOSPADM

## 2025-07-24 RX ORDER — REGADENOSON 0.08 MG/ML
0.4 INJECTION, SOLUTION INTRAVENOUS
Status: COMPLETED | OUTPATIENT
Start: 2025-07-24 | End: 2025-07-24

## 2025-07-24 RX ADMIN — REGADENOSON 0.4 MG: 0.08 INJECTION, SOLUTION INTRAVENOUS at 09:18

## 2025-07-24 RX ADMIN — SODIUM CHLORIDE, PRESERVATIVE FREE 10 ML: 5 INJECTION INTRAVENOUS at 07:52

## 2025-07-24 RX ADMIN — TETROFOSMIN 10.2 MILLICURIE: 0.23 INJECTION, POWDER, LYOPHILIZED, FOR SOLUTION INTRAVENOUS at 07:51

## 2025-07-24 RX ADMIN — TETROFOSMIN 32 MILLICURIE: 0.23 INJECTION, POWDER, LYOPHILIZED, FOR SOLUTION INTRAVENOUS at 09:18

## 2025-07-24 RX ADMIN — SODIUM CHLORIDE, PRESERVATIVE FREE 10 ML: 5 INJECTION INTRAVENOUS at 09:20

## 2025-07-25 ENCOUNTER — TELEPHONE (OUTPATIENT)
Dept: CARDIOLOGY CLINIC | Age: 50
End: 2025-07-25

## 2025-07-25 LAB
ECHO BSA: 2.15 M2
NUC STRESS EJECTION FRACTION: 36 %
STRESS BASELINE DIAS BP: 105 MMHG
STRESS BASELINE HR: 82 BPM
STRESS BASELINE SYS BP: 121 MMHG
STRESS ESTIMATED WORKLOAD: 1 METS
STRESS PEAK DIAS BP: 70 MMHG
STRESS PEAK SYS BP: 122 MMHG
STRESS PERCENT HR ACHIEVED: 65 %
STRESS POST PEAK HR: 111 BPM
STRESS RATE PRESSURE PRODUCT: NORMAL BPM*MMHG
STRESS TARGET HR: 171 BPM
TID: 0.89

## 2025-07-25 NOTE — TELEPHONE ENCOUNTER
----- Message from Dr. Titi Wilson MD sent at 7/25/2025  9:26 AM EDT -----  Regarding: Stress test  Tell him that his stress showed no indication of blockages. Heart function low  Continue current cardiac medications  Cleared for his hip surgery - Fax the papers (I have them) to [facility]  Schedule OV in 2-3 months to add medication if his BP OK    Called patient gave results

## 2025-07-28 ENCOUNTER — TELEPHONE (OUTPATIENT)
Dept: CARDIOLOGY CLINIC | Age: 50
End: 2025-07-28

## 2025-07-31 LAB
BASOPHILS ABSOLUTE: 0 /ΜL
BASOPHILS RELATIVE PERCENT: 0.3 %
BUN BLDV-MCNC: 20 MG/DL
CALCIUM SERPL-MCNC: 9.7 MG/DL
CHLORIDE BLD-SCNC: 105 MMOL/L
CO2: 27 MMOL/L
CREAT SERPL-MCNC: 2.2 MG/DL
EGFR: 39
EOSINOPHILS ABSOLUTE: 0.4 /ΜL
EOSINOPHILS RELATIVE PERCENT: 4.9 %
GLUCOSE BLD-MCNC: 136 MG/DL
HCT VFR BLD CALC: 43.9 % (ref 41–53)
HEMOGLOBIN: 14 G/DL (ref 13.5–17.5)
LYMPHOCYTES ABSOLUTE: 2.9 /ΜL
LYMPHOCYTES RELATIVE PERCENT: 38.7 %
MCH RBC QN AUTO: 28.4 PG
MCHC RBC AUTO-ENTMCNC: 31.9 G/DL
MCV RBC AUTO: 89.1 FL
MONOCYTES ABSOLUTE: 0.6 /ΜL
MONOCYTES RELATIVE PERCENT: 7.4 %
NEUTROPHILS ABSOLUTE: 3.7 /ΜL
NEUTROPHILS RELATIVE PERCENT: 48.7 %
PLATELET # BLD: 276 K/ΜL
PMV BLD AUTO: 9.4 FL
POTASSIUM SERPL-SCNC: 3.9 MMOL/L
RBC # BLD: 4.93 10^6/ΜL
SODIUM BLD-SCNC: 139 MMOL/L
WBC # BLD: 7.5 10^3/ML

## 2025-08-01 ENCOUNTER — TELEPHONE (OUTPATIENT)
Dept: CARDIOLOGY | Age: 50
End: 2025-08-01

## 2025-08-01 NOTE — TELEPHONE ENCOUNTER
Called PT 1x and LM to schedule echo.    Electronically signed by Dee Boucher on 8/1/2025 at 1:24 PM

## (undated) DEVICE — KENDALL 450 SERIES MONITORING FOAM ELECTRODE - RECTANGULAR SHAPE ( 3/PK): Brand: KENDALL

## (undated) DEVICE — GOWN ISOLATN REG YEL M WT MULTIPLY SIDETIE LEV 2

## (undated) DEVICE — TUBING, SUCTION, 1/4" X 10', STRAIGHT: Brand: MEDLINE

## (undated) DEVICE — 6 X 9  1.75MIL 4-WALL LABGUARD: Brand: MINIGRIP COMMERCIAL LLC

## (undated) DEVICE — Device: Brand: DEFENDO VALVE AND CONNECTOR KIT

## (undated) DEVICE — LUBRICANT SURG JELLY ST BACTER TUBE 4.25OZ

## (undated) DEVICE — KIT BEDSIDE REVITAL OX 500ML

## (undated) DEVICE — CONTAINER SPEC COLL 960ML POLYPR TRIANG GRAD INTAKE/OUTPUT

## (undated) DEVICE — MASK,FACE,MAXFLUIDPROTECT,SHIELD/ERLPS: Brand: MEDLINE

## (undated) DEVICE — SPONGE GZ 4IN 4IN 4 PLY N WVN AVANT